# Patient Record
Sex: FEMALE | Race: WHITE | NOT HISPANIC OR LATINO | Employment: FULL TIME | URBAN - METROPOLITAN AREA
[De-identification: names, ages, dates, MRNs, and addresses within clinical notes are randomized per-mention and may not be internally consistent; named-entity substitution may affect disease eponyms.]

---

## 2019-04-16 ENCOUNTER — TRANSFERRED RECORDS (OUTPATIENT)
Dept: HEALTH INFORMATION MANAGEMENT | Facility: CLINIC | Age: 29
End: 2019-04-16

## 2019-08-14 ENCOUNTER — TRANSFERRED RECORDS (OUTPATIENT)
Dept: HEALTH INFORMATION MANAGEMENT | Facility: CLINIC | Age: 29
End: 2019-08-14

## 2020-01-02 ENCOUNTER — TRANSFERRED RECORDS (OUTPATIENT)
Dept: HEALTH INFORMATION MANAGEMENT | Facility: CLINIC | Age: 30
End: 2020-01-02

## 2021-03-05 ENCOUNTER — TELEPHONE (OUTPATIENT)
Dept: GASTROENTEROLOGY | Facility: CLINIC | Age: 31
End: 2021-03-05

## 2021-03-05 NOTE — TELEPHONE ENCOUNTER
[facility] Call Center    Phone Message    May a detailed message be left on voicemail: yes     Reason for Call: Other: New patient // DX Crohn's // Refd by Dr Akin Bernal @ [facility] in [place]  // only wants to see Robert // first available over one month out // please call patient      Action Taken: Message routed to:  [facility] ([facility]): GI    Travel Screening: Not Applicable

## 2021-03-07 ENCOUNTER — HEALTH MAINTENANCE LETTER (OUTPATIENT)
Age: 31
End: 2021-03-07

## 2021-03-08 NOTE — TELEPHONE ENCOUNTER
Contacted patient to discuss appointment with Dr. Jones   Left voicemail with  my name and number.

## 2021-03-08 NOTE — TELEPHONE ENCOUNTER
Patient returned call  Pt is referred by Dr. Kayley Bernal  Due to scheduling Ms. Lyon will see MsDionisio Caceres and then can be scheduled with Dr. Jones for next visit  Patient has Crohns  Patient previously been on 6MP

## 2021-03-09 NOTE — TELEPHONE ENCOUNTER
REFERRAL INFORMATION:    Referring Provider:  Dr. Akin Bernal     Referring Clinic:  [facility]    Reason for Visit/Diagnosis: Would like to establish care with Dr. Jones      FUTURE VISIT INFORMATION:    Appointment Date: 3/29/2021    Appointment Time: 9 AM      NOTES STATUS DETAILS   OFFICE NOTE from Referring Provider Care Everywhere 3/2/2021, 14, 3/18/13, 12, 6/15/12 Office visit with Dr. Bernal     OFFICE NOTE from Other Specialist Care Everywhere 12, 5/3/12 Office visit with Dr. Cesar Stein ([facility])    12 Office visit with Dr. Gudelia Olvera ([facility])     HOSPITAL DISCHARGE SUMMARY/  ED VISITS Care Everywhere 2020 ([facility])     13, 12 ([facility])    OPERATIVE REPORT N/A    MEDICATION LIST N/A         ENDOSCOPY  Care Everywhere EGD: 12 ([facility])   COLONOSCOPY Care Everywhere 2021, 6/8/15, 12 ([facility])    ERCP N/A    EUS N/A    STOOL TESTING N/A    PERTINENT LABS Care Everywhere    PATHOLOGY REPORTS (RELATED) Care Everywhere 2021, 6/8/15, 12   IMAGING (CT, MRI, EGD, MRCP, Small Bowel Follow Through/SBT, MR/CT Enterography) Care Everywhere [facility]):  - CT Enterography: 2/3/2021, 5/3/12  - CT Abdomen Pelvis: 12    [facility]):  - CT Abdomen Pelvis: 2020     3/25/2021 4:13pm Fax request sent to [facility] ([phone]) for images. -Kiko     [phone]  [facility]  Health Information Management  [address] [place] [id number]  Ph: [phone]

## 2021-03-16 ENCOUNTER — OFFICE VISIT (OUTPATIENT)
Dept: DERMATOLOGY | Facility: CLINIC | Age: 31
End: 2021-03-16
Payer: COMMERCIAL

## 2021-03-16 DIAGNOSIS — Z12.83 SCREENING FOR SKIN CANCER: Primary | ICD-10-CM

## 2021-03-16 DIAGNOSIS — D22.9 ACRAL NEVUS: ICD-10-CM

## 2021-03-16 DIAGNOSIS — D22.9 MULTIPLE BENIGN NEVI: ICD-10-CM

## 2021-03-16 PROCEDURE — 99203 OFFICE O/P NEW LOW 30 MIN: CPT | Mod: GC | Performed by: DERMATOLOGY

## 2021-03-16 ASSESSMENT — PAIN SCALES - GENERAL: PAINLEVEL: NO PAIN (0)

## 2021-03-16 NOTE — PROGRESS NOTES
[facility] Dermatology Note  Encounter Date: Mar 16, 2021  Office Visit     Dermatology Problem List:  1. Benign appearing nevi  2. Nevi to monitor  - Left flank, R plantar foot, L plantar foot - measurements and photos obtained 3/16/21 (see below)  - used to follow at [facility] in [facility], records requested 3/16/21  3. Family hx of melanoma in grandfather     ____________________________________________    Assessment & Plan:     # Nevi to monitor  Left flank - photodocumentation below  R plantar foot -  photodocumentation below  L plantar foot -  photodocumentation below  Reassured patient of overall benign appearance on exam today. Measurements and photodocumentation obtained today.   - will have patient come back for spot check of these lesions in 6 months, then if no changes or concerning lesions, ok for yearly exams    # Multiple clinically benign nevi   - Reassured patient of benign appearence of her nevi on today's exam. No treatment is necessary at this time unless the nevi change or become symptomatic.     Procedures Performed:   None    Follow-up: 6 month(s) in-person, or earlier for new or changing lesions    Staff and Resident:   Yasmin Walker MD  Dermatology Resident, PGY2    Patient was seen and examined with the dermatology resident. I agree with the history, review of systems, physical examination, assessments and plan.    Malena Carson MD  Professor and  Chair  Department of Dermatology  [facility]  ____________________________________________    CC: Skin Check (Marybel, is here for a skin check today,  pt states she has an area on her face and under her feet. )    HPI:  Ms. Marybel Lyon is a(n) 30 year old female who presents today as a new patient for skin check. Says that she used to follow at [facility] in [place]. Says that she has had several moles biopsied in the past. Denies any hx of abnormal results on these biopsies. Says that her  grandfather had melanoma. Denies any lesions of concern today. Also says she is thinking about getting laser hair removal on her face. Says that there is a mole that has hair growing out of it and she wants to make sure it is ok to get the laser hair removal done.     Labs Reviewed:  None    Physical Exam:  Vitals: There were no vitals taken for this visit.  SKIN: Full skin was performed and included the head/face, neck, both arms, chest, back, abdomen, both legs, buttocks, digits and/or nails.   - On the trunk, arms, and legs, there are scattered medium to dark brown 3-5 mm macules with uniform pigment network present under dermoscopy, consistent with benign melanocytic nevi  - On the face, there are scattered 3-4 mm light brown papules and macules, consistent with benign melanocytic nevi    Left flank:   - 5x4 mm medium brown to red macule with irregular borders and globular pattern under dermoscopy               - Right plantar foot:         - 5x5 mm dark brown macule with lattice pattern under dermoscopy  - several dark brown 2-3 mm macules          - Left plantar foot:         - 5x5 mm dark brown macule with lattice pattern under dermoscopy  - several dark brown 2-3 mm macules      - No other lesions of concern on areas examined.     Medications:  No current outpatient medications on file.     No current facility-administered medications for this visit.       Past Medical History:   There is no problem list on file for this patient.    No past medical history on file.    CC Referred Self, MD  No address on file on close of this encounter.

## 2021-03-16 NOTE — LETTER
3/16/2021        RE: Marybel Lyon  [address]  [address] [facility] [address]     Dear Colleague,    Thank you for referring your patient, Marybel Lyon, to the [facility] [place] [facility]. Please see a copy of my visit note below.    [facility] Dermatology Note  Encounter Date: Mar 16, 2021  Office Visit     Dermatology Problem List:  1. Benign appearing nevi  2. Nevi to monitor  - Left flank, R plantar foot, L plantar foot - measurements and photos obtained 3/16/21 (see below)  - used to follow at [facility] in [facility], records requested 3/16/21  3. Family hx of melanoma in grandfather     ____________________________________________    Assessment & Plan:     # Nevi to monitor  Left flank - photodocumentation below  R plantar foot -  photodocumentation below  L plantar foot -  photodocumentation below  Reassured patient of overall benign appearance on exam today. Measurements and photodocumentation obtained today.   - will have patient come back for spot check of these lesions in 6 months, then if no changes or concerning lesions, ok for yearly exams    # Multiple clinically benign nevi   - Reassured patient of benign appearence of her nevi on today's exam. No treatment is necessary at this time unless the nevi change or become symptomatic.     Procedures Performed:   None    Follow-up: 6 month(s) in-person, or earlier for new or changing lesions    Staff and Resident:   Yasmin Walekr MD  Dermatology Resident, PGY2    Patient was seen and examined with the dermatology resident. I agree with the history, review of systems, physical examination, assessments and plan.    Malena Carson MD  Professor and  Chair  Department of Dermatology  [facility]  ____________________________________________    CC: Skin Check (Marybel, is here for a skin check today,  pt states she  has an area on her face and under her feet. )    HPI:  Ms. Marybel Lyon is a(n) 30 year old female who presents today as a new patient for skin check. Says that she used to follow at [facility] in [place]. Says that she has had several moles biopsied in the past. Denies any hx of abnormal results on these biopsies. Says that her grandfather had melanoma. Denies any lesions of concern today. Also says she is thinking about getting laser hair removal on her face. Says that there is a mole that has hair growing out of it and she wants to make sure it is ok to get the laser hair removal done.     Labs Reviewed:  None    Physical Exam:  Vitals: There were no vitals taken for this visit.  SKIN: Full skin was performed and included the head/face, neck, both arms, chest, back, abdomen, both legs, buttocks, digits and/or nails.   - On the trunk, arms, and legs, there are scattered medium to dark brown 3-5 mm macules with uniform pigment network present under dermoscopy, consistent with benign melanocytic nevi  - On the face, there are scattered 3-4 mm light brown papules and macules, consistent with benign melanocytic nevi    Left flank:   - 5x4 mm medium brown to red macule with irregular borders and globular pattern under dermoscopy               - Right plantar foot:         - 5x5 mm dark brown macule with lattice pattern under dermoscopy  - several dark brown 2-3 mm macules          - Left plantar foot:         - 5x5 mm dark brown macule with lattice pattern under dermoscopy  - several dark brown 2-3 mm macules      - No other lesions of concern on areas examined.     Medications:  No current outpatient medications on file.     No current facility-administered medications for this visit.       Past Medical History:   There is no problem list on file for this patient.    No past medical history on file.    CC Referred Self, MD  No address on file on close of this encounter.        Again, thank you for allowing me to  participate in the care of your patient.      Sincerely,    Malena Carson MD

## 2021-03-16 NOTE — NURSING NOTE
Chief Complaint   Patient presents with     Skin Check     Marybel, is here for a skin check today,  pt states she has an area on her face and under her feet.    Jan Lorenz EMT

## 2021-03-16 NOTE — PATIENT INSTRUCTIONS
ABCDE s and Sun protection:     Sun Protection and ABCDEs  It is important to keep your skin protected from the sun. The best sun protection is by avoiding sun from getting to your skin by seeking shade, staying out of the sun during peak hours (10AM-2PM), wearing long sleeves or sun-protective clothing.    If these measures are not feasible, or for other sun-exposed areas (like the face), it is important to wear wear sunscreen SPF 30+ that covers both UVA and UVB. We prefer physical block sunscreens with zinc oxide or titanium dioxide as the active ingredients.     In addition, watch for signs of worrisome moles and check all your skin regularly. Monthly is recommended.      This includes:  A - asymmetry: not the same on the both sides  B - border: irregular borders, jagged or bumpy borders  C - color: very black or multi colored moles  D - diameter: anything bigger than a pencil eraser  E - evolution: anything that is changing please come in immediately for     Moles should not be tender, bleed, or itch.     It is important to let us know if you have any of these concerning features right away.     Avoid tanning bed use.

## 2021-03-24 ENCOUNTER — OFFICE VISIT (OUTPATIENT)
Dept: OBGYN | Facility: CLINIC | Age: 31
End: 2021-03-24
Payer: COMMERCIAL

## 2021-03-24 VITALS
HEIGHT: 64 IN | SYSTOLIC BLOOD PRESSURE: 130 MMHG | DIASTOLIC BLOOD PRESSURE: 91 MMHG | WEIGHT: 128.6 LBS | BODY MASS INDEX: 21.95 KG/M2 | HEART RATE: 103 BPM

## 2021-03-24 DIAGNOSIS — D25.9 UTERINE LEIOMYOMA, UNSPECIFIED LOCATION: ICD-10-CM

## 2021-03-24 DIAGNOSIS — Z00.00 ENCOUNTER FOR PREVENTIVE HEALTH EXAMINATION: Primary | ICD-10-CM

## 2021-03-24 DIAGNOSIS — Z12.4 SCREENING FOR CERVICAL CANCER: ICD-10-CM

## 2021-03-24 PROCEDURE — 99385 PREV VISIT NEW AGE 18-39: CPT | Mod: GE | Performed by: OBSTETRICS & GYNECOLOGY

## 2021-03-24 PROCEDURE — G0145 SCR C/V CYTO,THINLAYER,RESCR: HCPCS | Performed by: OBSTETRICS & GYNECOLOGY

## 2021-03-24 PROCEDURE — G0463 HOSPITAL OUTPT CLINIC VISIT: HCPCS | Mod: 25

## 2021-03-24 SDOH — HEALTH STABILITY: MENTAL HEALTH: HOW OFTEN DO YOU HAVE A DRINK CONTAINING ALCOHOL?: NOT ASKED

## 2021-03-24 SDOH — HEALTH STABILITY: MENTAL HEALTH: HOW OFTEN DO YOU HAVE SIX OR MORE DRINKS ON ONE OCCASION?: NOT ASKED

## 2021-03-24 SDOH — HEALTH STABILITY: MENTAL HEALTH: HOW MANY DRINKS CONTAINING ALCOHOL DO YOU HAVE ON A TYPICAL DAY WHEN YOU ARE DRINKING?: NOT ASKED

## 2021-03-24 ASSESSMENT — ANXIETY QUESTIONNAIRES
5. BEING SO RESTLESS THAT IT IS HARD TO SIT STILL: NOT AT ALL
3. WORRYING TOO MUCH ABOUT DIFFERENT THINGS: SEVERAL DAYS
6. BECOMING EASILY ANNOYED OR IRRITABLE: NOT AT ALL
GAD7 TOTAL SCORE: 2
7. FEELING AFRAID AS IF SOMETHING AWFUL MIGHT HAPPEN: NOT AT ALL
1. FEELING NERVOUS, ANXIOUS, OR ON EDGE: SEVERAL DAYS
2. NOT BEING ABLE TO STOP OR CONTROL WORRYING: NOT AT ALL

## 2021-03-24 ASSESSMENT — PATIENT HEALTH QUESTIONNAIRE - PHQ9
5. POOR APPETITE OR OVEREATING: NOT AT ALL
SUM OF ALL RESPONSES TO PHQ QUESTIONS 1-9: 2

## 2021-03-24 ASSESSMENT — MIFFLIN-ST. JEOR: SCORE: 1293.33

## 2021-03-24 NOTE — NURSING NOTE
Chief Complaint   Patient presents with     Physical     Est care and discuss pelvic uls. Pap due.       See STEVE Llanos 3/24/2021

## 2021-03-25 ASSESSMENT — ANXIETY QUESTIONNAIRES: GAD7 TOTAL SCORE: 2

## 2021-03-26 ENCOUNTER — ANCILLARY PROCEDURE (OUTPATIENT)
Dept: ULTRASOUND IMAGING | Facility: CLINIC | Age: 31
End: 2021-03-26
Attending: OBSTETRICS & GYNECOLOGY
Payer: COMMERCIAL

## 2021-03-26 DIAGNOSIS — D25.9 UTERINE LEIOMYOMA, UNSPECIFIED LOCATION: ICD-10-CM

## 2021-03-26 PROCEDURE — 76830 TRANSVAGINAL US NON-OB: CPT | Mod: 26 | Performed by: OBSTETRICS & GYNECOLOGY

## 2021-03-26 PROCEDURE — 76830 TRANSVAGINAL US NON-OB: CPT

## 2021-03-29 ENCOUNTER — PRE VISIT (OUTPATIENT)
Dept: GASTROENTEROLOGY | Facility: CLINIC | Age: 31
End: 2021-03-29

## 2021-03-29 ENCOUNTER — VIRTUAL VISIT (OUTPATIENT)
Dept: GASTROENTEROLOGY | Facility: CLINIC | Age: 31
End: 2021-03-29
Payer: COMMERCIAL

## 2021-03-29 VITALS — BODY MASS INDEX: 20.49 KG/M2 | HEIGHT: 64 IN | WEIGHT: 120 LBS

## 2021-03-29 DIAGNOSIS — K50.00 CROHN'S DISEASE OF SMALL INTESTINE WITHOUT COMPLICATION (H): Primary | ICD-10-CM

## 2021-03-29 LAB
COPATH REPORT: NORMAL
PAP: NORMAL

## 2021-03-29 PROCEDURE — 99205 OFFICE O/P NEW HI 60 MIN: CPT | Mod: 95 | Performed by: PHYSICIAN ASSISTANT

## 2021-03-29 ASSESSMENT — MIFFLIN-ST. JEOR: SCORE: 1254.32

## 2021-03-29 NOTE — NURSING NOTE
"Chief Complaint   Patient presents with     New Patient       Vitals:    03/29/21 0842   Weight: 54.4 kg (120 lb)   Height: 1.626 m (5' 4\")       Body mass index is 20.6 kg/m .    Moni Trent CMA    "

## 2021-03-29 NOTE — PROGRESS NOTES
"Marybel Lyon is a 30 year old female who is being evaluated via a billable video visit.      The patient has been notified of following:     \"This video visit will be conducted via a call between you and your physician/provider. We have found that certain health care needs can be provided without the need for an in-person physical exam.  This service lets us provide the care you need with a video conversation.  If a prescription is necessary we can send it directly to your pharmacy.  If lab work is needed we can place an order for that and you can then stop by our lab to have the test done at a later time.    If during the course of the call the physician/provider feels a video visit is not appropriate, you will not be charged for this service.\"     Patient confirmed that they are in [place] for today's visit no. Patient is currently in [place] visit parents.    Video-Visit Details  Type of service:  Video Visit    Video Start Time: 9:34 AM  Video End Time:  1030 AM    Originating Location (pt. Location): Home    Distant Location (provider location):  [facility] [place]     Platform used: [employer] due to connectivity issues    IBD CLINIC VISIT     CC/REFERRING MD:  Dr. Akin Bernal  REASON FOR FOLLOW UP: Small bowel Crohn's  COLLABORATING PHYSICIAN: Shiv Jones MD    CROHN'S HISTORY:  Age at diagnosis: 2012  Extent of disease: small bowel   Disease phenotype: inflammatory  Chayito-anal disease: none  Current CD medications: none  Prior IBD surgeries: none  Prior IBD Medications: 50 mg 6MP    DRUG MONITORING  TPMT enzyme activity: 40.4 (6/14/2012)    6-TGN/6-MMPN levels: --    Biologic concentration:--    DISEASE ASSESSMENT  Labs:  No results found for: ALBUMIN  No lab results found.    Invalid input(s):  ALB,  HGB  Endoscopic assessment:   Colonoscopy 1/26/21 findings: On external exam, there were small perianal skin tags.  Normal mucosa was found in the entire colon. " There was   mild scarring and retraction at the IC valve, but no   active colonic inflammation seen. Biopsies were taken   with a cold forceps for histology. Estimated blood   loss: none.  The ileum was deeply intubated to 30cm proximal to the   IC valve. Overall there were a few small apthous ulcer   seen, but overall normal appearing and not obstructed.   Biopsies were taken with a cold forceps for histology.   Estimated blood loss: none.    Impression: - Normal mucosa in the entire examined colon. Biopsied.  - Mild evidence of terminal ileum ulceration,   consistent with her history of Crohn's disease.    A) Terminal ileum, biopsy:     Terminal ileal mucosa with erosion/ulceration with focus suggestive of a rare granuloma.     See comment.   B) Right colon, biopsy:     Colonic mucosa with few granulomas.       Enterography: 2/3/21 CTE There is a long segment of small bowel likely jejunum with concentric wall thickening which demonstrates improvement compared to November 2020. There is narrowing of the lumen and what appears to be an area of stenosis but no persistent proximal small bowel dilatation which is an improvement compared to previous study. Mucosal enhancement suggests an element of active Crohn's disease.  Fecal calprotectin: --   C diff: --    ASSESSMENT/PLAN  Ms. Lyon is a 30 year old year old female here to establish care for small bowel Crohn's disease.    1. Small bowel crohn's disease:  Most recent restaging of her disease includes colonoscopy 1/2021 showing mild disease with small ulcers in TI (no significant stricturing disease and allowed for deep intubation of ileum) on biopsies, and CTE showing a long segment of jejunal narrowing (seen in 2012 and 2020 in CTE) with concentric wall thickening (improved from 11/2020). She is currently in clinical remission despite no IBD therapy for the last 5 years. Given these findings and a recent hospitalization 11/2020 for pSBO, we discussed medication  management moving forward with recommendations to restart therapy.  Although she has previously been on 6MP, we discussed a biologic may be a better alternative in her case, considering Ustekinumab. Given her long segment of disease, surgery would not be the best alternative at this time and there would be significant concerns from a nutrition standpoint following.  We will obtain labs for baseline.      Update: Discussed case with Dr. Jones. Plan to start stelara (or anti TNF pending insurance coverage) and repeat MRE in one year from med start. If she were to have symptoms, then MRE and potential deep enteroscopy with Dr. Benavidez to dilate area, but overall goal is to minimize or avoid surgery.  -- Labs to include CBC, LFTs, CRP, ESR, TPMT, Vitamin B12, TB quant   -- [facility] pharmacy consult for new biologic start  -- GI dietitian consult    2. IBD healthcare maintenance based on patients current medication:    Vaccinations:  -- Influenza (every year)  -- TdaP (every 10 years)  -- Pneumococcal Pneumonia (once then every 5 years)  -- Yearly assessment for latent Tb (verbal screening and exam, PPD or QuantiFERON-Tb testing): Will obtain    One time confirmation of immunity or serologies:  -- Hepatitis A (serologies or immunizations)  -- Hepatitis B (serologies or immunizations): serologies indicate immunity 6/2012  -- Varicella: --  -- MMR:--  -- HPV (all aged 18-26): --  -- Meningococcal meningitis (all patients at risk for meningitis): --   -- Due to the immunosuppression in this patient, I would not advise administration of live vaccines such as varicella/VZV, intranasal influenza, MMR, or yellow fever vaccine (if travelling).      Bone mineral density screening   -- Recommend all patients supplement with calcium and vitamin D  -- Given prior steroid use recommend DEXA if not already done    Cancer Screening:  Colon cancer screening:  Given disease is limited to small bowel, dysplasia screening is recommended at age  45-50 unless symptomatic sooner.    Cervical cancer screening: N/A    Skin cancer screening: Annual visual exam of skin by dermatologist since patient is immunocompromised    Depression Screening:  -- Over the last month, have you felt down, depressed, or hopeless? No  -- Over the last month, have you felt little interest or pleasure doing things? No    Misc:  -- Avoid tobacco use  -- Avoid NSAIDs as there is potentially a 25% chance of causing an IBD flare    RTC 3 months    Thank you for this consultation.  It was a pleasure to participate in the care of this patient; please contact us with any further questions.      Shady Caceres PA-C  Division of Gastroenterology, Hepatology and Nutrition  [facility]      HPI:   Ms. Lyon is a 30 year old year old female here to establish care for small bowel Crohn's disease.    Patient was diagnosed with small bowel Crohn's in [place] 6/2012 with colonscopy at [facility] when she established care with Dr. Bernal. She was started on 6MP and she did well after this time in combination with dietary changes. She then moved to [place] in 2017 and she stopped 6MP on her own given her clinically stability. She reports she had a barium swallow study with GI provider in [place] and she notes that it was agreed she could continue without medication managed without medications (no endoscopic procedures at that time). She had a little overlap where she lived in [place] briefly before her move to [place] and had a flare in 11/2020 when she reestablished with Dr. Bernal and was hospitalized.  Hospitalization 11/2020 due to abdominal pain and pSBO. IV steroids given at this time followed by PO taper at discharge. Responded well to steroids.     Currently she is having 1-2 stools per day that are formed. No blood in the stool. No abdominal pain since hospitalization in 11/2021. When she has had a flare in the past this would be pain in upper left abdomen and is accompanied  nausea.     She denies any joint, eye or skin problems. She has no hx of EIM.      ROS:  Complete 10 System ROS performed. All are negative except as documented below, in the HPI, or in patient questionnaire from today's visit.    No fevers or chills  No weight loss  No blurry vision, double vision or change in vision  No sore throat  No lymphadenopathy  No headache, paraesthesias, or weakness in a limb  No shortness of breath or wheezing  No chest pain or pressure  No arthralgias or myalgias  No rashes or skin changes  No odynophagia or dysphagia  No BRBPR, hematochezia, melena  No dysuria, frequency or urgency  No hot/cold intolerance or polyria  No anxiety or depression    Extra intestinal manifestations of IBD:  No uveitis/episcleritis  No aphthous ulcers   No arthritis   No erythema nodosum/pyoderma gangrenosum.     PERTINENT PAST MEDICAL HISTORY:  Past Medical History:   Diagnosis Date     Crohn's disease (H)        PREVIOUS SURGERIES:  Past Surgical History:   Procedure Laterality Date     NO HISTORY OF SURGERY         PREVIOUS ENDOSCOPY:  Result Impression     Findings:            The digital rectal exam was abnormal. Findings include                        non-thrombosed external hemorrhoids. The distal ileum                        contained a few ulcers. No bleeding was present.                        Biopsies were taken with a cold forceps for histology.                        Inflammation characterized by deep ulcerations and                        marked deformity was found on the ileo-cecal valve.                        This was moderate in severity. Biopsies were taken with                        a cold forceps for histology. Internal hemorrhoids were                        found during retroflexion and were small. No other                        significant abnormalities were identified in a careful                        examination of the remainder of the colon.    Impression:          - Rectal  exam revealed non-thrombosed external                        hemorrhoids.                       - A few ulcers in the distal ileum. This was biopsied.                       - Inflammation, ulceration and marked deformity was                        found secondary to Crohn's disease on the ileocecal                        valve. This was biopsied.                       - Internal hemorrhoids.         ALLERGIES:     Allergies   Allergen Reactions     Other Environmental Allergy Itching and Visual Disturbance       PERTINENT MEDICATIONS:  No current outpatient medications on file.    SOCIAL HISTORY:  Social History     Socioeconomic History     Marital status: Single     Spouse name: Not on file     Number of children: Not on file     Years of education: Not on file     Highest education level: Not on file   Occupational History     Not on file   Social Needs     Financial resource strain: Not on file     Food insecurity     Worry: Not on file     Inability: Not on file     Transportation needs     Medical: Not on file     Non-medical: Not on file   Tobacco Use     Smoking status: Never Smoker     Smokeless tobacco: Never Used   Substance and Sexual Activity     Alcohol use: Yes     Drug use: Never     Sexual activity: Not Currently     Partners: Male     Birth control/protection: None   Lifestyle     Physical activity     Days per week: Not on file     Minutes per session: Not on file     Stress: Not on file   Relationships     Social connections     Talks on phone: Not on file     Gets together: Not on file     Attends Oriental orthodox service: Not on file     Active member of club or organization: Not on file     Attends meetings of clubs or organizations: Not on file     Relationship status: Not on file     Intimate partner violence     Fear of current or ex partner: Not on file     Emotionally abused: Not on file     Physically abused: Not on file     Forced sexual activity: Not on file   Other Topics Concern     Not on  "file   Social History Narrative     Not on file       FAMILY HISTORY:  Cousin has UC  Family History   Problem Relation Age of Onset     Other - See Comments Mother         Hysterectomy     Hypertension Father      Cerebrovascular Disease Maternal Grandmother 50     Colon Cancer Maternal Grandfather      Melanoma Maternal Grandfather      Diabetes Type 1 Paternal Grandmother      Other - See Comments Paternal Grandmother         uterine prolapse     Heart Disease Paternal Grandfather 50        Heart attack       Past/family/social history reviewed and no changes    PHYSICAL EXAMINATION:  Constitutional: aaox3, cooperative, pleasant, not dyspneic/diaphoretic, no acute distress  Vitals reviewed: Ht 1.626 m (5' 4\")   Wt 54.4 kg (120 lb)   LMP 03/06/2021   BMI 20.60 kg/m    Wt:   Wt Readings from Last 2 Encounters:   03/29/21 54.4 kg (120 lb)   03/24/21 58.3 kg (128 lb 9.6 oz)      Constitutional - general appearance is well and in no acute distress. Body habitus normal  Eyes - No redness or discharge  Respiratory - No cough, unlabored breathing  Musculoskeletal - range of motion intact: Neck and arms  Skin - No discoloration or lesions  Neurological - No tremors, headaches  Psychiatric - No anxiety, alert & oriented    PERTINENT STUDIES:  Most recent CBC:  No lab results found.  Most recent hepatic panel:  No lab results found.    Invalid input(s): THO, ALP  Most recent creatinine:  No lab results found.                "

## 2021-03-29 NOTE — PATIENT INSTRUCTIONS
It was a pleasure taking care of you today.  I've included a brief summary of our discussion and care plan from today's visit below.  Please review this information with your primary care provider.  ______________________________________________________________________    My recommendations are summarized as follows:    --Plan to discuss case with Dr. Jones  -- I will be in touch regarding next steps  -- consider appt with IBD dietitian   -- Appt with pharmacy pending medication choice    -- Labs when able  -- Patient with IBD we recommend supplementation vitamin D 1000 units daily and calcium 500 mg twice daily.    -- No NSAIDs (ibuprofen, or anything containing ibuprofen)       For additional resources about inflammatory bowel disease visit [email or web address]      Return to GI Clinic in 3 months to review your progress.    ______________________________________________________________________    Who do I call with any questions after my visit?  Please be in touch if there are any further questions that arise following today's visit.  There are multiple ways to contact your gastroenterology care team.        During business hours, you may reach a Gastroenterology nurse at [phone], option 3.       To schedule or reschedule an appointment, please call [phone].       You can always send a secure message through [employer].  [employer] messages are answered by your nurse or doctor typically within 24 hours.  Please allow extra time on weekends and holidays.        For urgent/emergent questions after business hours, you may reach the on-call GI Fellow by contacting the [facility] at [phone].      In order for your refill to be processed in a timely fashion, it is your responsibility to ensure you follow the recommendations from your provider regarding your laboratory studies and follow up appointments.      If an imaging study has been ordered for you, please call  [phone] to schedule.       If you are scheduled for an endoscopy (colonoscopy or upper endoscopy) and have not heard from the endoscopy team within a week, please call [phone] to schedule.        How will I get the results of any tests ordered?    You will receive all of your results.  If you have signed up for [employer], any tests ordered at your visit will be available to you after your physician reviews them.  Typically this takes 1-2 weeks.  If there are urgent results that require a change in your care plan, your physician or nurse will call you to discuss the next steps.      What is [employer]?  [employer] is a secure way for you to access all of your healthcare records from the [facility].  It is a web based computer program, so you can sign on to it from any location.  It also allows you to send secure messages to your care team.  I recommend signing up for [employer] access if you have not already done so and are comfortable with using a computer.      How to I schedule a follow-up visit?  If you did not schedule a follow-up visit today, please call [phone] to schedule a follow-up office visit.        Sincerely,    Shady Caceres PA-C  [facility]  Division of Gastroenterology

## 2021-03-29 NOTE — LETTER
"    3/29/2021         RE: Marybel Lyon  [address]  [address] [facility] [address]        Dear Colleague,    Thank you for referring your patient, Marybel Lyon, to the [facility] [place]. Please see a copy of my visit note below.    Marybel Lyon is a 30 year old female who is being evaluated via a billable video visit.      The patient has been notified of following:     \"This video visit will be conducted via a call between you and your physician/provider. We have found that certain health care needs can be provided without the need for an in-person physical exam.  This service lets us provide the care you need with a video conversation.  If a prescription is necessary we can send it directly to your pharmacy.  If lab work is needed we can place an order for that and you can then stop by our lab to have the test done at a later time.    If during the course of the call the physician/provider feels a video visit is not appropriate, you will not be charged for this service.\"     Patient confirmed that they are in [place] for today's visit no. Patient is currently in [place] visit parents.    Video-Visit Details  Type of service:  Video Visit    Video Start Time: 9:34 AM  Video End Time:  1030 AM    Originating Location (pt. Location): Home    Distant Location (provider location):  [facility] [place]     Platform used: GameTube + MuciMed due to connectivity issues    IBD CLINIC VISIT     CC/REFERRING MD:  Dr. Akin Bernal  REASON FOR FOLLOW UP: Small bowel Crohn's  COLLABORATING PHYSICIAN: Shiv Jones MD    CROHN'S HISTORY:  Age at diagnosis: 2012  Extent of disease: small bowel   Disease phenotype: inflammatory  Chayito-anal disease: none  Current CD medications: none  Prior IBD surgeries: none  Prior IBD Medications: 50 mg 6MP    DRUG MONITORING  TPMT enzyme activity: 40.4 (6/14/2012)    6-TGN/6-MMPN levels: --    Biologic " concentration:--    DISEASE ASSESSMENT  Labs:  No results found for: ALBUMIN  No lab results found.    Invalid input(s):  ALB,  HGB  Endoscopic assessment:   Colonoscopy 1/26/21 findings: On external exam, there were small perianal skin tags.  Normal mucosa was found in the entire colon. There was   mild scarring and retraction at the IC valve, but no   active colonic inflammation seen. Biopsies were taken   with a cold forceps for histology. Estimated blood   loss: none.  The ileum was deeply intubated to 30cm proximal to the   IC valve. Overall there were a few small apthous ulcer   seen, but overall normal appearing and not obstructed.   Biopsies were taken with a cold forceps for histology.   Estimated blood loss: none.    Impression: - Normal mucosa in the entire examined colon. Biopsied.  - Mild evidence of terminal ileum ulceration,   consistent with her history of Crohn's disease.    A) Terminal ileum, biopsy:     Terminal ileal mucosa with erosion/ulceration with focus suggestive of a rare granuloma.     See comment.   B) Right colon, biopsy:     Colonic mucosa with few granulomas.       Enterography: 2/3/21 CTE There is a long segment of small bowel likely jejunum with concentric wall thickening which demonstrates improvement compared to November 2020. There is narrowing of the lumen and what appears to be an area of stenosis but no persistent proximal small bowel dilatation which is an improvement compared to previous study. Mucosal enhancement suggests an element of active Crohn's disease.  Fecal calprotectin: --   C diff: --    ASSESSMENT/PLAN  Ms. Lyon is a 30 year old year old female here to establish care for small bowel Crohn's disease.    1. Small bowel crohn's disease:  Most recent restaging of her disease includes colonoscopy 1/2021 showing mild disease with small ulcers in TI (no significant stricturing disease and allowed for deep intubation of ileum) on biopsies, and CTE showing a long  segment of jejunal narrowing (seen in 2012 and 2020 in CTE) with concentric wall thickening (improved from 11/2020). She is currently in clinical remission despite no IBD therapy for the last 5 years. Given these findings and a recent hospitalization 11/2020 for pSBO, we discussed medication management moving forward with recommendations to restart therapy.  Although she has previously been on 6MP, we discussed a biologic may be a better alternative in her case, considering Ustekinumab. Given her long segment of disease, surgery would not be the best alternative at this time and there would be significant concerns from a nutrition standpoint following.  We will obtain labs for baseline.      Update: Discussed case with Dr. Jones. Plan to start stelara (or anti TNF pending insurance coverage) and repeat MRE in one year from med start. If she were to have symptoms, then MRE and potential deep enteroscopy with Dr. Benavidez to dilate area, but overall goal is to minimize or avoid surgery.  -- Labs to include CBC, LFTs, CRP, ESR, TPMT, Vitamin B12, TB quant   -- [facility] pharmacy consult for new biologic start  -- GI dietitian consult    2. IBD healthcare maintenance based on patients current medication:    Vaccinations:  -- Influenza (every year)  -- TdaP (every 10 years)  -- Pneumococcal Pneumonia (once then every 5 years)  -- Yearly assessment for latent Tb (verbal screening and exam, PPD or QuantiFERON-Tb testing): Will obtain    One time confirmation of immunity or serologies:  -- Hepatitis A (serologies or immunizations)  -- Hepatitis B (serologies or immunizations): serologies indicate immunity 6/2012  -- Varicella: --  -- MMR:--  -- HPV (all aged 18-26): --  -- Meningococcal meningitis (all patients at risk for meningitis): --   -- Due to the immunosuppression in this patient, I would not advise administration of live vaccines such as varicella/VZV, intranasal influenza, MMR, or yellow fever vaccine (if travelling).       Bone mineral density screening   -- Recommend all patients supplement with calcium and vitamin D  -- Given prior steroid use recommend DEXA if not already done    Cancer Screening:  Colon cancer screening:  Given disease is limited to small bowel, dysplasia screening is recommended at age 45-50 unless symptomatic sooner.    Cervical cancer screening: N/A    Skin cancer screening: Annual visual exam of skin by dermatologist since patient is immunocompromised    Depression Screening:  -- Over the last month, have you felt down, depressed, or hopeless? No  -- Over the last month, have you felt little interest or pleasure doing things? No    Misc:  -- Avoid tobacco use  -- Avoid NSAIDs as there is potentially a 25% chance of causing an IBD flare    RTC 3 months    Thank you for this consultation.  It was a pleasure to participate in the care of this patient; please contact us with any further questions.      Shady Caceres PA-C  Division of Gastroenterology, Hepatology and Nutrition  [facility]      HPI:   Ms. Lyon is a 30 year old year old female here to establish care for small bowel Crohn's disease.    Patient was diagnosed with small bowel Crohn's in [place] 6/2012 with colonscopy at [facility] when she established care with Dr. Bernal. She was started on 6MP and she did well after this time in combination with dietary changes. She then moved to [place] in 2017 and she stopped 6MP on her own given her clinically stability. She reports she had a barium swallow study with GI provider in [place] and she notes that it was agreed she could continue without medication managed without medications (no endoscopic procedures at that time). She had a little overlap where she lived in [place] briefly before her move to [place] and had a flare in 11/2020 when she reestablished with Dr. Bernal and was hospitalized.  Hospitalization 11/2020 due to abdominal pain and pSBO. IV steroids given at this time  followed by PO taper at discharge. Responded well to steroids.     Currently she is having 1-2 stools per day that are formed. No blood in the stool. No abdominal pain since hospitalization in 11/2021. When she has had a flare in the past this would be pain in upper left abdomen and is accompanied nausea.     She denies any joint, eye or skin problems. She has no hx of EIM.      ROS:  Complete 10 System ROS performed. All are negative except as documented below, in the HPI, or in patient questionnaire from today's visit.    No fevers or chills  No weight loss  No blurry vision, double vision or change in vision  No sore throat  No lymphadenopathy  No headache, paraesthesias, or weakness in a limb  No shortness of breath or wheezing  No chest pain or pressure  No arthralgias or myalgias  No rashes or skin changes  No odynophagia or dysphagia  No BRBPR, hematochezia, melena  No dysuria, frequency or urgency  No hot/cold intolerance or polyria  No anxiety or depression    Extra intestinal manifestations of IBD:  No uveitis/episcleritis  No aphthous ulcers   No arthritis   No erythema nodosum/pyoderma gangrenosum.     PERTINENT PAST MEDICAL HISTORY:  Past Medical History:   Diagnosis Date     Crohn's disease (H)        PREVIOUS SURGERIES:  Past Surgical History:   Procedure Laterality Date     NO HISTORY OF SURGERY         PREVIOUS ENDOSCOPY:  Result Impression     Findings:            The digital rectal exam was abnormal. Findings include                        non-thrombosed external hemorrhoids. The distal ileum                        contained a few ulcers. No bleeding was present.                        Biopsies were taken with a cold forceps for histology.                        Inflammation characterized by deep ulcerations and                        marked deformity was found on the ileo-cecal valve.                        This was moderate in severity. Biopsies were taken with                        a cold  forceps for histology. Internal hemorrhoids were                        found during retroflexion and were small. No other                        significant abnormalities were identified in a careful                        examination of the remainder of the colon.    Impression:          - Rectal exam revealed non-thrombosed external                        hemorrhoids.                       - A few ulcers in the distal ileum. This was biopsied.                       - Inflammation, ulceration and marked deformity was                        found secondary to Crohn's disease on the ileocecal                        valve. This was biopsied.                       - Internal hemorrhoids.         ALLERGIES:     Allergies   Allergen Reactions     Other Environmental Allergy Itching and Visual Disturbance       PERTINENT MEDICATIONS:  No current outpatient medications on file.    SOCIAL HISTORY:  Social History     Socioeconomic History     Marital status: Single     Spouse name: Not on file     Number of children: Not on file     Years of education: Not on file     Highest education level: Not on file   Occupational History     Not on file   Social Needs     Financial resource strain: Not on file     Food insecurity     Worry: Not on file     Inability: Not on file     Transportation needs     Medical: Not on file     Non-medical: Not on file   Tobacco Use     Smoking status: Never Smoker     Smokeless tobacco: Never Used   Substance and Sexual Activity     Alcohol use: Yes     Drug use: Never     Sexual activity: Not Currently     Partners: Male     Birth control/protection: None   Lifestyle     Physical activity     Days per week: Not on file     Minutes per session: Not on file     Stress: Not on file   Relationships     Social connections     Talks on phone: Not on file     Gets together: Not on file     Attends Islam service: Not on file     Active member of club or organization: Not on file     Attends  "meetings of clubs or organizations: Not on file     Relationship status: Not on file     Intimate partner violence     Fear of current or ex partner: Not on file     Emotionally abused: Not on file     Physically abused: Not on file     Forced sexual activity: Not on file   Other Topics Concern     Not on file   Social History Narrative     Not on file       FAMILY HISTORY:  Cousin has UC  Family History   Problem Relation Age of Onset     Other - See Comments Mother         Hysterectomy     Hypertension Father      Cerebrovascular Disease Maternal Grandmother 50     Colon Cancer Maternal Grandfather      Melanoma Maternal Grandfather      Diabetes Type 1 Paternal Grandmother      Other - See Comments Paternal Grandmother         uterine prolapse     Heart Disease Paternal Grandfather 50        Heart attack       Past/family/social history reviewed and no changes    PHYSICAL EXAMINATION:  Constitutional: aaox3, cooperative, pleasant, not dyspneic/diaphoretic, no acute distress  Vitals reviewed: Ht 1.626 m (5' 4\")   Wt 54.4 kg (120 lb)   LMP 03/06/2021   BMI 20.60 kg/m    Wt:   Wt Readings from Last 2 Encounters:   03/29/21 54.4 kg (120 lb)   03/24/21 58.3 kg (128 lb 9.6 oz)      Constitutional - general appearance is well and in no acute distress. Body habitus normal  Eyes - No redness or discharge  Respiratory - No cough, unlabored breathing  Musculoskeletal - range of motion intact: Neck and arms  Skin - No discoloration or lesions  Neurological - No tremors, headaches  Psychiatric - No anxiety, alert & oriented    PERTINENT STUDIES:  Most recent CBC:  No lab results found.  Most recent hepatic panel:  No lab results found.    Invalid input(s): THO, ALP  Most recent creatinine:  No lab results found.      Again, thank you for allowing me to participate in the care of your patient.        Sincerely,        Shady Caceres PA-C    "

## 2021-04-08 ENCOUNTER — TELEPHONE (OUTPATIENT)
Dept: GASTROENTEROLOGY | Facility: CLINIC | Age: 31
End: 2021-04-08

## 2021-04-08 DIAGNOSIS — K50.90 CROHN'S DISEASE (H): ICD-10-CM

## 2021-04-09 NOTE — TELEPHONE ENCOUNTER
----- Message from Shady Caceres PA-C sent at 4/7/2021 11:03 AM CDT -----  Domingo Roy,    While Jennifer is out can we get started with Stelara to see if it will be approved for her?    Thank you!    Shady  ----- Message -----  From: Shiv Jones MD  Sent: 4/7/2021   8:02 AM CDT  To: Jennifer Encarnacion RN, Shady Caceres PA-C    Yes I think Stelara is reasonable.     Surgery would only be an option for recurrent obstructions. And in that area, there are substantial concerns about nutritional concerns after that surgery.    I think Stelara is fine (or anti-TNF based on insurance coverage). And then repeat MRE in 1 year or earlier if symptoms.     If symptomatic, then MRE and potential deep enteroscopy with amateau to dilate area... But overall goal is to minimize or avoid surgery    Thanks!   ----- Message -----  From: Shady Caceres PA-C  Sent: 3/29/2021  11:02 AM CDT  To: Jennifer Encarnacion RN, Shiv Jones MD    Hi Shiv and Jennifer!    I know you are on a much needed and deserved vacation so please disregard until your return. Just want to get you up to speed on this patient after I just saw her.    This is a new small bowel Crohn's patient that transferred care from Dr. Akin Bernal in [place]. She was diagnosed in 2012, put on 6MP for a few years then discontinued therapy. She did well until last fall and had a partial small bowel obstruction 11/2020 resolving with steroids, no bridge to therapy.    Most recent restaging of her disease includes colonoscopy 1/2021 showing mild disease with small ulcers in TI (no significant stricturing disease and allowed for deep intubation of ileum) on biopsies, and CTE showing a long segment of jejunal narrowing (seen in 2012 and 2020 in CTE) with concentric wall thickening (improved from 11/2020). She is currently in clinical remission despite no IBD therapy for the last 5 years. Given these findings and a recent hospitalization 11/2020 for pSBO, we discussed medication  management moving forward with recommendations to restart therapy.    I suggested Stelara as a good option for her but said I would discuss with you.    Questions:  -- Ok with that plan?  -- She would also just like to know if surgery would every be in the picture as an alternative to medication. I was thinking it would be unlikely given it is a long segment of disease, but said I would check with you.    She is very comfortable with the plan we would decide would be best for her.    Jennifer-- Once I have confirmation regarding plan from Shiv do you mind setting up the following:  -- Follow up with Shiv in 3 months  -- Appt with pharmacy  -- Appt with [facility]  -- Prior auth pending Shiv's response to med    Thank you!! Hope you both are enjoying/enjoyed a much deserved vacation!!!    --Shady

## 2021-04-09 NOTE — TELEPHONE ENCOUNTER
Order placed for the patient to start Stelara.  will request approval through the patient insurance.

## 2021-04-13 ENCOUNTER — DOCUMENTATION ONLY (OUTPATIENT)
Dept: GASTROENTEROLOGY | Facility: CLINIC | Age: 31
End: 2021-04-13

## 2021-04-13 ENCOUNTER — PATIENT OUTREACH (OUTPATIENT)
Dept: GASTROENTEROLOGY | Facility: CLINIC | Age: 31
End: 2021-04-13

## 2021-04-13 NOTE — CONFIDENTIAL NOTE
Contacted prior team to check on prior   Patient has not had a quantiferon gold completed  Will withdraw the prior as will be denied and then would need to appeal  Contacted patient to discuss need for quantiferon gold  Patient scheduled on April 15 as earliest pt has time to complete lab  Once resulted will contact prior team to start the prior   Patient aware of the plan.   Will send literature to patient.

## 2021-04-15 DIAGNOSIS — K50.00 CROHN'S DISEASE OF SMALL INTESTINE WITHOUT COMPLICATION (H): ICD-10-CM

## 2021-04-15 LAB
ALBUMIN SERPL-MCNC: 3.1 G/DL (ref 3.4–5)
ALP SERPL-CCNC: 40 U/L (ref 40–150)
ALT SERPL W P-5'-P-CCNC: 18 U/L (ref 0–50)
AST SERPL W P-5'-P-CCNC: 10 U/L (ref 0–45)
BASOPHILS # BLD AUTO: 0.1 10E9/L (ref 0–0.2)
BASOPHILS NFR BLD AUTO: 0.9 %
BILIRUB DIRECT SERPL-MCNC: <0.1 MG/DL (ref 0–0.2)
BILIRUB SERPL-MCNC: 0.2 MG/DL (ref 0.2–1.3)
CRP SERPL-MCNC: 3 MG/L (ref 0–8)
DIFFERENTIAL METHOD BLD: NORMAL
EOSINOPHIL # BLD AUTO: 0.3 10E9/L (ref 0–0.7)
EOSINOPHIL NFR BLD AUTO: 4.1 %
ERYTHROCYTE [DISTWIDTH] IN BLOOD BY AUTOMATED COUNT: 12.9 % (ref 10–15)
ERYTHROCYTE [SEDIMENTATION RATE] IN BLOOD BY WESTERGREN METHOD: 8 MM/H (ref 0–20)
FOLATE SERPL-MCNC: 6 NG/ML
HCT VFR BLD AUTO: 39.4 % (ref 35–47)
HGB BLD-MCNC: 12.9 G/DL (ref 11.7–15.7)
IMM GRANULOCYTES # BLD: 0 10E9/L (ref 0–0.4)
IMM GRANULOCYTES NFR BLD: 0.5 %
LYMPHOCYTES # BLD AUTO: 1.3 10E9/L (ref 0.8–5.3)
LYMPHOCYTES NFR BLD AUTO: 16.4 %
MCH RBC QN AUTO: 27.8 PG (ref 26.5–33)
MCHC RBC AUTO-ENTMCNC: 32.7 G/DL (ref 31.5–36.5)
MCV RBC AUTO: 85 FL (ref 78–100)
MONOCYTES # BLD AUTO: 0.7 10E9/L (ref 0–1.3)
MONOCYTES NFR BLD AUTO: 9.1 %
NEUTROPHILS # BLD AUTO: 5.5 10E9/L (ref 1.6–8.3)
NEUTROPHILS NFR BLD AUTO: 69 %
NRBC # BLD AUTO: 0 10*3/UL
NRBC BLD AUTO-RTO: 0 /100
PLATELET # BLD AUTO: 301 10E9/L (ref 150–450)
PROT SERPL-MCNC: 6.2 G/DL (ref 6.8–8.8)
RBC # BLD AUTO: 4.64 10E12/L (ref 3.8–5.2)
VIT B12 SERPL-MCNC: 550 PG/ML (ref 193–986)
WBC # BLD AUTO: 8 10E9/L (ref 4–11)

## 2021-04-15 PROCEDURE — 82746 ASSAY OF FOLIC ACID SERUM: CPT | Mod: 90 | Performed by: PATHOLOGY

## 2021-04-15 PROCEDURE — 86481 TB AG RESPONSE T-CELL SUSP: CPT | Mod: 90 | Performed by: PATHOLOGY

## 2021-04-15 PROCEDURE — 99000 SPECIMEN HANDLING OFFICE-LAB: CPT | Performed by: PATHOLOGY

## 2021-04-15 PROCEDURE — 36415 COLL VENOUS BLD VENIPUNCTURE: CPT | Performed by: PATHOLOGY

## 2021-04-15 PROCEDURE — 80076 HEPATIC FUNCTION PANEL: CPT | Performed by: PATHOLOGY

## 2021-04-15 PROCEDURE — 82607 VITAMIN B-12: CPT | Performed by: PATHOLOGY

## 2021-04-15 PROCEDURE — 85025 COMPLETE CBC W/AUTO DIFF WBC: CPT | Performed by: PATHOLOGY

## 2021-04-15 PROCEDURE — 86140 C-REACTIVE PROTEIN: CPT | Performed by: PATHOLOGY

## 2021-04-15 PROCEDURE — 85652 RBC SED RATE AUTOMATED: CPT | Performed by: PATHOLOGY

## 2021-04-15 PROCEDURE — 82657 ENZYME CELL ACTIVITY: CPT | Mod: 90 | Performed by: PATHOLOGY

## 2021-04-18 LAB
GAMMA INTERFERON BACKGROUND BLD IA-ACNC: 0.13 IU/ML
M TB IFN-G CD4+ BCKGRND COR BLD-ACNC: 8.05 IU/ML
M TB TUBERC IFN-G BLD QL: NEGATIVE
MITOGEN IGNF BCKGRD COR BLD-ACNC: 0 IU/ML
MITOGEN IGNF BCKGRD COR BLD-ACNC: 0 IU/ML

## 2021-04-19 NOTE — CONFIDENTIAL NOTE
Contacted  Prior authorization to informed team that quantifrhoda gold is negative and can resubmit the prior for stelara.   Left a message requesting a call back.

## 2021-04-21 ENCOUNTER — PATIENT OUTREACH (OUTPATIENT)
Dept: GASTROENTEROLOGY | Facility: CLINIC | Age: 31
End: 2021-04-21

## 2021-04-21 LAB — TPMT BLD-CCNC: 24.9 U/ML (ref 24–44)

## 2021-04-21 NOTE — CONFIDENTIAL NOTE
Patient  approved for dose of stelara  Patient has the number to call to schedule infusion  Will start the prior for injections after the infusion date.

## 2021-04-22 DIAGNOSIS — Z20.822 ENCOUNTER FOR LABORATORY TESTING FOR COVID-19 VIRUS: Primary | ICD-10-CM

## 2021-05-05 ENCOUNTER — INFUSION THERAPY VISIT (OUTPATIENT)
Dept: INFUSION THERAPY | Facility: CLINIC | Age: 31
End: 2021-05-05
Attending: PHYSICIAN ASSISTANT
Payer: COMMERCIAL

## 2021-05-05 VITALS
OXYGEN SATURATION: 100 % | BODY MASS INDEX: 22.26 KG/M2 | SYSTOLIC BLOOD PRESSURE: 136 MMHG | DIASTOLIC BLOOD PRESSURE: 88 MMHG | HEART RATE: 85 BPM | WEIGHT: 129.7 LBS | TEMPERATURE: 97.7 F | RESPIRATION RATE: 16 BRPM

## 2021-05-05 DIAGNOSIS — K50.90 CROHN'S DISEASE (H): Primary | ICD-10-CM

## 2021-05-05 PROCEDURE — 96365 THER/PROPH/DIAG IV INF INIT: CPT

## 2021-05-05 PROCEDURE — 258N000003 HC RX IP 258 OP 636: Performed by: PHYSICIAN ASSISTANT

## 2021-05-05 PROCEDURE — 250N000011 HC RX IP 250 OP 636: Performed by: PHYSICIAN ASSISTANT

## 2021-05-05 RX ADMIN — USTEKINUMAB 390 MG: 130 SOLUTION INTRAVENOUS at 12:13

## 2021-05-05 ASSESSMENT — PAIN SCALES - GENERAL: PAINLEVEL: NO PAIN (0)

## 2021-05-05 NOTE — LETTER
5/5/2021         RE: Marybel Lyon  [address]  [address] [facility] [address]        Dear Colleague,    Thank you for referring your patient, Marybel Lyon, to the [facility]. Please see a copy of my visit note below.    Nursing Note  Marybel Lyon presents [facility] for:   Chief Complaint   Patient presents with     Infusion     Stalara     During today's [facility] appointment, orders from JOANNE Duron were completed.  Frequency: once    Progress note:  Patient identification verified by name and date of birth.  Assessment completed.  Vitals recorded in Doc Flowsheets.  Patient was provided with education regarding medication/procedure and possible side effects.  Patient verbalized understanding.     present during visit today: Not Applicable.    Treatment Conditions: ~~~ NOTE: If the patient answers yes to any of the questions below, hold the infusion and contact ordering provider or on-call provider.    1. Have you recently had an elevated temperature, fever, chills, productive cough, coughing for 3 weeks or longer or hemoptysis, abnormal vital signs, night sweats,  chest pain or have you noticed a decrease in your appetite, unexplained weight loss or fatigue? No  2. Do you have any open wounds or new incisions? No  3. Do you have any recent or upcoming hospitalizations, surgeries or dental procedures? No  4. Do you currently have or recently have had any signs of illness or infection or are you on any antibiotics? No  5. Have you had any new, sudden or worsening abdominal pain? No  6. Have you or anyone in your household received a live vaccination in the past 4 weeks? Please note:  No live vaccines while on biologic/chemotherapy until 6 months after the last treatment.  Patient can receive the flu vaccine (shot only) and the pneumovax.  It is optimal  for the patient to get these vaccines mid cycle, but they can be given at any time as long as it is not on the day of the infusion. No  7. Have you recently been diagnosed with any new nervous system diseases (ie. Multiple sclerosis, Guillain [place], seizures, neurological changes) or cancer diagnosis? No  8. Are you on any form of radiation or chemotherapy? No  9. Are you pregnant or breast feeding or do you have plans of pregnancy in the future? No  10. Have you been having any signs of worsening depression or suicidal ideations?  (benlysta only) NA  11. Have there been any other new onset medical symptoms? No      Premedications: were not ordered.    Drug Waste Record: No    Infusion length and rate:  infusion given over approximately 60 minutes    Labs: were not ordered for this appointment.    Vascular access: peripheral IV placed today.    Is the next appt scheduled? NA  Asymptomatic COVID test completed? No    Post Infusion Assessment:  Patient tolerated infusion without incident.  Site patent and intact, free from redness, edema or discomfort.  No evidence of extravasations.  Access discontinued per protocol.     POST-INFUSION OF BIOLOGICAL MEDICATION:  Reviewed with patient.  Given biologic medication or medication hand-out. Inform patient if any fever, chills or signs of infection, new symptoms, abdominal pain, heart palpitations, shortness of breath, reaction, weakness, neurological changes, seek medical attention immediately and should not receive infusions. No live virus vaccines prior to or during treatment or up to 6 months post infusion. If the patient has an upcoming procedure or surgery, this should be discussed with the rheumatologist and surgeon or provider.      Discharge Plan:   Follow up plan of care with: ongoing infusions at [facility].  Discharge instructions were reviewed with patient.  Patient/representative verbalized understanding of discharge instructions  and all questions answered.  Patient discharged from [facility] in stable condition.    Administrations This Visit     ustekinumab (STELARA) 390 mg in sodium chloride 0.9 % 250 mL infusion     Admin Date  05/05/2021 Action  New Bag Dose  390 mg Rate  250 mL/hr Route  Intravenous Administered By  Trish Davis RN Hope Balcos, RN        BP (!) 131/93 (BP Location: Left arm, Patient Position: Sitting, Cuff Size: Adult Regular)   Pulse 91   Temp 97.7  F (36.5  C) (Oral)   Resp 16   Wt 58.8 kg (129 lb 11.2 oz)   SpO2 93%   BMI 22.26 kg/m            Again, thank you for allowing me to participate in the care of your patient.        Sincerely,        [facility]

## 2021-05-05 NOTE — PROGRESS NOTES
Nursing Note  Marybel Lyon presents [facility] for:   Chief Complaint   Patient presents with     Infusion     St[facility]     During today's [facility] appointment, orders from JOANNE Duron were completed.  Frequency: once    Progress note:  Patient identification verified by name and date of birth.  Assessment completed.  Vitals recorded in Doc Flowsheets.  Patient was provided with education regarding medication/procedure and possible side effects.  Patient verbalized understanding.     present during visit today: Not Applicable.    Treatment Conditions: ~~~ NOTE: If the patient answers yes to any of the questions below, hold the infusion and contact ordering provider or on-call provider.    1. Have you recently had an elevated temperature, fever, chills, productive cough, coughing for 3 weeks or longer or hemoptysis, abnormal vital signs, night sweats,  chest pain or have you noticed a decrease in your appetite, unexplained weight loss or fatigue? No  2. Do you have any open wounds or new incisions? No  3. Do you have any recent or upcoming hospitalizations, surgeries or dental procedures? No  4. Do you currently have or recently have had any signs of illness or infection or are you on any antibiotics? No  5. Have you had any new, sudden or worsening abdominal pain? No  6. Have you or anyone in your household received a live vaccination in the past 4 weeks? Please note:  No live vaccines while on biologic/chemotherapy until 6 months after the last treatment.  Patient can receive the flu vaccine (shot only) and the pneumovax.  It is optimal for the patient to get these vaccines mid cycle, but they can be given at any time as long as it is not on the day of the infusion. No  7. Have you recently been diagnosed with any new nervous system diseases (ie. Multiple sclerosis, Guillain Harlan, seizures, neurological changes) or cancer  diagnosis? No  8. Are you on any form of radiation or chemotherapy? No  9. Are you pregnant or breast feeding or do you have plans of pregnancy in the future? No  10. Have you been having any signs of worsening depression or suicidal ideations?  (benlysta only) NA  11. Have there been any other new onset medical symptoms? No      Premedications: were not ordered.    Drug Waste Record: No    Infusion length and rate:  infusion given over approximately 60 minutes    Labs: were not ordered for this appointment.    Vascular access: peripheral IV placed today.    Is the next appt scheduled? NA  Asymptomatic COVID test completed? No    Post Infusion Assessment:  Patient tolerated infusion without incident.  Site patent and intact, free from redness, edema or discomfort.  No evidence of extravasations.  Access discontinued per protocol.     POST-INFUSION OF BIOLOGICAL MEDICATION:  Reviewed with patient.  Given biologic medication or medication hand-out. Inform patient if any fever, chills or signs of infection, new symptoms, abdominal pain, heart palpitations, shortness of breath, reaction, weakness, neurological changes, seek medical attention immediately and should not receive infusions. No live virus vaccines prior to or during treatment or up to 6 months post infusion. If the patient has an upcoming procedure or surgery, this should be discussed with the rheumatologist and surgeon or provider.      Discharge Plan:   Follow up plan of care with: ongoing infusions at [facility].  Discharge instructions were reviewed with patient.  Patient/representative verbalized understanding of discharge instructions and all questions answered.  Patient discharged from [facility] in stable condition.    Administrations This Visit     ustekinumab (STELARA) 390 mg in sodium chloride 0.9 % 250 mL infusion     Admin Date  05/05/2021 Action  New Bag Dose  390 mg Rate  250 mL/hr  Route  Intravenous Administered By  Trish Davis RN Hope Balcos, RN        BP (!) 131/93 (BP Location: Left arm, Patient Position: Sitting, Cuff Size: Adult Regular)   Pulse 91   Temp 97.7  F (36.5  C) (Oral)   Resp 16   Wt 58.8 kg (129 lb 11.2 oz)   SpO2 93%   BMI 22.26 kg/m

## 2021-05-05 NOTE — PATIENT INSTRUCTIONS
Dear Marybel Lyon    Thank you for choosing [facility] ([facility]) for your infusion.  The following information is a summary of our appointment as well as important reminders.      EDUCATION POST BIOLOGICAL/CHEMOTHERAPY INFUSION  Call the triage nurse at your clinic or seek medical attention if you have chills and/or temperature greater than or equal to 100.5, uncontrolled nausea/vomiting, diarrhea, constipation, dizziness, shortness of breath, chest pain, heart palpitations, weakness or any other new or concerning symptoms, questions or concerns.  You can not have any live virus vaccines prior to or during treatment or up to 6 months post infusion.  If you have an upcoming surgery, medical procedure or dental procedure during treatment, this should be discussed with your ordering physician and your surgeon/dentist.  If you are having any concerning symptom, if you are unsure if you should get your next infusion or wish to speak to a provider before your next infusion, please call your care coordinator or triage nurse at your clinic to notify them so we can adequately serve you.        We look forward in seeing you on your next appointment here at [facility] ([facility]).  Please don t hesitate to call us at [phone] to reschedule any of your appointments or to speak with one of the [facility] registered nurses.  It was a pleasure taking care of you today.    Sincerely,    [facility]  [address] [place] [address]  [id number]  Phone:  [phone]  Patient Education     Ustekinumab Solution for injection  What is this medicine?  USTEKINUMAB (US te KIN ue mab) is used to treat plaque psoriasis and psoriatic arthritis. It is not a cure.  This medicine may be used for other purposes; ask your health care provider or pharmacist if you have questions.  What should I  tell my health care provider before I take this medicine?  They need to know if you have any of these conditions:    cancer    diabetes    immune system problems    infection (especially a virus infection such as chickenpox, cold sores, or herpes)or history of infections    receiving or have received allergy shots    recently received or scheduled to receive a vaccine    tuberculosis, a positive skin test for tuberculosis, or have recently been in close contact with someone who has tuberculosis    an unusual reaction to ustekinumab, other medicines, foods, dyes, or preservatives    pregnant or trying to get pregnant    breast-feeding  How should I use this medicine?  This medicine is for injection under the skin. You will be taught how to prepare and give this medicine. Use exactly as directed. Take your medicine at regular intervals. Do not take your medicine more often than directed.  It is important that you put your used needles and syringes in a special sharps container. Do not put them in a trash can. If you do not have a sharps container, call your pharmacist or healthcare provider to get one.  A special MedGuide will be given to you by the pharmacist with each prescription and refill. Be sure to read this information carefully each time.  Talk to your pediatrician regarding the use of this medicine in children. Special care may be needed.  Overdosage: If you think you've taken too much of this medicine contact a poison control center or emergency room at once.  NOTE: This medicine is only for you. Do not share this medicine with others.  What if I miss a dose?  If you miss a dose, take it as soon as you can. If it is almost time for your next dose, take only that dose. Do not take double or extra doses.  What may interact with this medicine?  Do not take this medicine with any of the following medications:    live virus vaccines  This medicine may also interact with the following  medications:    cyclosporine    immunosuppressives    vaccines    warfarin  This list may not describe all possible interactions. Give your health care provider a list of all the medicines, herbs, non-prescription drugs, or dietary supplements you use. Also tell them if you smoke, drink alcohol, or use illegal drugs. Some items may interact with your medicine.  What should I watch for while using this medicine?  Your condition will be monitored carefully while you are receiving this medicine. Tell your doctor or healthcare professional if your symptoms do not start to get better or if they get worse.  You will be tested for tuberculosis (TB) before you start this medicine. If your doctor prescribes any medicine for TB, you should start taking the TB medicine before starting this medicine. Make sure to finish the full course of TB medicine.  Call your doctor or health care professional if you get a cold or other infection while receiving this medicine. Do not treat yourself. This medicine may decrease your body's ability to fight infection.  Talk to your doctor about your risk of cancer. You may be more at risk for certain types of cancers if you take this medicine.  What side effects may I notice from receiving this medicine?  Side effects that you should report to your doctor or health care professional as soon as possible:    allergic reactions like skin rash, itching or hives, swelling of the face, lips, or tongue    breathing problems    changes in vision    confusion    fever, chills, or any other sign of infection    seizures    swollen lymph nodes in the neck, underarm, or groin areas    unexplained weight loss    unusually weak or tired  Side effects that usually do not require medical attention (Report these to your doctor or health care professional if they continue or are bothersome.):    headache    redness, itching, swelling, or bruising at site where injected  This list may not describe all possible  side effects. Call your doctor for medical advice about side effects. You may report side effects to FDA at 6-815-JBL-3368.  Where should I keep my medicine?  Keep out of the reach of children.  If you are using this medicine at home, you will be instructed on how to store this medicine. Throw away any unused medicine after the expiration date on the label.  NOTE: This sheet is a summary. It may not cover all possible information. If you have questions about this medicine, talk to your doctor, pharmacist, or health care provider.  NOTE:This sheet is a summary. It may not cover all possible information. If you have questions about this medicine, talk to your doctor, pharmacist, or health care provider. Copyright  2016 Gold Standard

## 2021-06-14 DIAGNOSIS — K50.90 CROHN'S DISEASE (H): Primary | ICD-10-CM

## 2021-06-14 RX ORDER — USTEKINUMAB 90 MG/ML
INJECTION, SOLUTION SUBCUTANEOUS
Qty: 1 ML | Refills: 5 | Status: SHIPPED | OUTPATIENT
Start: 2021-06-14 | End: 2022-03-09

## 2021-06-23 ENCOUNTER — TELEPHONE (OUTPATIENT)
Dept: GASTROENTEROLOGY | Facility: CLINIC | Age: 31
End: 2021-06-23

## 2021-06-23 NOTE — TELEPHONE ENCOUNTER
PA Initiation    Medication: STELARA  Insurance Company: [employer] - Phone [phone] Fax [phone]  Pharmacy Filling the Rx: [address]  Filling Pharmacy Phone:    Filling Pharmacy Fax:    Start Date: 6/23/2021    JULES HERNANDEZ (Hernandez: [id number])

## 2021-06-28 ENCOUNTER — MYC MEDICAL ADVICE (OUTPATIENT)
Dept: RHEUMATOLOGY | Facility: CLINIC | Age: 31
End: 2021-06-28

## 2021-06-28 NOTE — TELEPHONE ENCOUNTER
Prior Authorization Approval    Authorization Effective Date: 6/28/2021  Authorization Expiration Date: 6/24/2022  Medication: STELARA - APPROVED   Approved Dose/Quantity:  1 FOR 56 DAYS   Reference #:     Insurance Company: [employer] - Phone [phone] Fax [phone]  Expected CoPay:       CoPay Card Available:      Foundation Assistance Needed:    Which Pharmacy is filling the prescription (Not needed for infusion/clinic administered): [facility] [address]  Pharmacy Notified: Yes  Patient Notified: Yes

## 2021-07-07 ENCOUNTER — ALLIED HEALTH/NURSE VISIT (OUTPATIENT)
Dept: GASTROENTEROLOGY | Facility: CLINIC | Age: 31
End: 2021-07-07
Payer: COMMERCIAL

## 2021-07-07 VITALS — TEMPERATURE: 98.1 F

## 2021-07-07 DIAGNOSIS — K50.90 CROHN'S DISEASE (H): Primary | ICD-10-CM

## 2021-07-07 PROCEDURE — 99207 PR NO BILLABLE SERVICE THIS VISIT: CPT

## 2021-07-07 NOTE — PROGRESS NOTES
Patient in for a initial stelara injection and injection teaching.  Teaching provided on medication,  storage of the medications and also proper technique to give a subcutaneous injection.   No questions  regarding stelara injection.  Patient returned demonstrated proper injection technique with practice syringe prior to medication injection.     Verified loading stelara infusion was 8 weeks prior to this visit.      NO signs and symptoms of illness present today prior to injection.      Patient was able to self administer injection and Patient tolerated the injection well, No reaction noted  20 after injection. Reaction signs and symptoms were discussed with patient and patient is aware that if they were to have any symptoms of   anaphylaxis that they should call 911.  patient will call clinic with any skin changes or injection site reactions.     Patient aware next injection is  in 8 weeks and I assisted the patient in placing next date of injection in phone calender.  patient  will call for follow up appointment with nurse if they do not feel comfortable administering medication at home by self.

## 2021-09-20 ENCOUNTER — OFFICE VISIT (OUTPATIENT)
Dept: DERMATOLOGY | Facility: CLINIC | Age: 31
End: 2021-09-20
Payer: COMMERCIAL

## 2021-09-20 DIAGNOSIS — D22.9 MULTIPLE BENIGN NEVI: Primary | ICD-10-CM

## 2021-09-20 DIAGNOSIS — L72.0 MILIA: ICD-10-CM

## 2021-09-20 PROCEDURE — 99213 OFFICE O/P EST LOW 20 MIN: CPT | Mod: GC | Performed by: DERMATOLOGY

## 2021-09-20 ASSESSMENT — PAIN SCALES - GENERAL: PAINLEVEL: NO PAIN (0)

## 2021-09-20 NOTE — NURSING NOTE
Dermatology Rooming Note    Stormy Lyon's goals for this visit include:   Chief Complaint   Patient presents with     Skin Check     stormy is coming in today for a skin check, states that she has a spot on her eyelid and a spot on her jawline that are of concern, states that the one on the jaw changed     Yissel Lawson CMA on 9/20/2021 at 12:56 PM

## 2021-09-20 NOTE — LETTER
9/20/2021       RE: Marybel Lyon  [address]  [address] [facility] [address]     Dear Colleague,    Thank you for referring your patient, Marybel Lyon, to the [facility] [place] [facility]. Please see a copy of my visit note below.    [facility] Dermatology Note  Encounter Date: Mar 16, 2021  Office Visit      Dermatology Problem List:  1. Benign appearing nevi  2. Nevi to monitor  - Left flank, R plantar foot, L plantar foot - measurements and photos obtained 3/16/21 and stable on 9/20/21  - used to follow at [facility] in [facility], records requested 3/16/21  3. Family hx of melanoma in grandfather      ____________________________________________     Assessment & Plan:      # Nevi to monitor  Left flank - photodocumentation in chart. Stable.  R plantar foot -  photodocumentation in chart. Stable.  L plantar foot -  photodocumentation in chart. Stable.  Reassured patient of overall benign appearance on exam today. Measurements and photodocumentation obtained today.     # Multiple clinically benign nevi   - Reassured patient of benign appearence of her nevi on today's exam. No treatment is necessary at this time unless the nevi change or become symptomatic.   #Millia, R eyelid  - reassurance that this is beingn and does not need treatent    Procedures Performed:   None     Follow-up: 12 month(s) in-person, or earlier for new or changing lesions     Staff and Resident:   Allison Estrella MD PGY-2  Dr Minnie MD -staff  I, Lela Hartman MD, saw this patient with the resident and agree with the resident s findings and plan of care as documented in the resident s note.      ____________________________________________     CC: f/u spot checks     HPI:  Ms. Marybel Lyon is a(n) 30 year old female who presents today for a follow up of nevi on L/R plantar feet and L flank. Says they seem  stable to her since LV. Denies new changes. Has a question about a lesion on her R eyelid and on her R lower cheek. R lower cheek nevus feels a little irritated to her.      Labs Reviewed:  None     Physical Exam:  Vitals: There were no vitals taken for this visit.  SKIN: Full skin was performed and included the head/face, neck, both arms, chest, back, abdomen, both legs, digits.  - On the trunk, arms, face, and legs, there are scattered medium to dark brown 3-5 mm macules with uniform pigment network present under dermoscopy, consistent with benign melanocytic nevi  - On the R eyelid there is a pinpoint white papule.  - R foot: 5x5 mm dark brown macule with lattice pattern under dermoscopy; several dark brown 2-3 mm macules.  - L foot  5x5 mm dark brown macule with lattice pattern under dermoscopy; several dark brown 2-3 mm macules.  - L flank  5x4 mm medium brown to red macule with irregular borders and globular pattern under dermoscopy

## 2021-09-20 NOTE — PROGRESS NOTES
[facility] Dermatology Note  Encounter Date: Mar 16, 2021  Office Visit      Dermatology Problem List:  1. Benign appearing nevi  2. Nevi to monitor  - Left flank, R plantar foot, L plantar foot - measurements and photos obtained 3/16/21 and stable on 9/20/21  - used to follow at [facility] in [facility], records requested 3/16/21  3. Family hx of melanoma in grandfather      ____________________________________________     Assessment & Plan:      # Nevi to monitor  Left flank - photodocumentation in chart. Stable.  R plantar foot -  photodocumentation in chart. Stable.  L plantar foot -  photodocumentation in chart. Stable.  Reassured patient of overall benign appearance on exam today. Measurements and photodocumentation obtained today.     # Multiple clinically benign nevi   - Reassured patient of benign appearence of her nevi on today's exam. No treatment is necessary at this time unless the nevi change or become symptomatic.   #Millia, R eyelid  - reassurance that this is beingn and does not need treatent    Procedures Performed:   None     Follow-up: 12 month(s) in-person, or earlier for new or changing lesions     Staff and Resident:   Allison Estrella MD PGY-2  Dr Minnie MD -staff  I, Lela Hartman MD, saw this patient with the resident and agree with the resident s findings and plan of care as documented in the resident s note.      ____________________________________________     CC: f/u spot checks     HPI:  Ms. Marybel Lyon is a(n) 30 year old female who presents today for a follow up of nevi on L/R plantar feet and L flank. Says they seem stable to her since LV. Denies new changes. Has a question about a lesion on her R eyelid and on her R lower cheek. R lower cheek nevus feels a little irritated to her.      Labs Reviewed:  None     Physical Exam:  Vitals: There were no vitals taken for this visit.  SKIN: Full skin was performed and included the head/face, neck, both  arms, chest, back, abdomen, both legs, digits.  - On the trunk, arms, face, and legs, there are scattered medium to dark brown 3-5 mm macules with uniform pigment network present under dermoscopy, consistent with benign melanocytic nevi  - On the R eyelid there is a pinpoint white papule.  - R foot: 5x5 mm dark brown macule with lattice pattern under dermoscopy; several dark brown 2-3 mm macules.  - L foot  5x5 mm dark brown macule with lattice pattern under dermoscopy; several dark brown 2-3 mm macules.  - L flank  5x4 mm medium brown to red macule with irregular borders and globular pattern under dermoscopy

## 2021-09-20 NOTE — PATIENT INSTRUCTIONS
Skin check: nevi appear stable today. Follow up in 1 year ior sooner if any concerns.     (R eyelid: milia cyst)

## 2021-10-11 ENCOUNTER — HEALTH MAINTENANCE LETTER (OUTPATIENT)
Age: 31
End: 2021-10-11

## 2021-12-16 ENCOUNTER — VIRTUAL VISIT (OUTPATIENT)
Dept: GASTROENTEROLOGY | Facility: CLINIC | Age: 31
End: 2021-12-16
Payer: COMMERCIAL

## 2021-12-16 DIAGNOSIS — K50.00 CROHN'S DISEASE OF SMALL INTESTINE WITHOUT COMPLICATION (H): Primary | ICD-10-CM

## 2021-12-16 PROCEDURE — 99213 OFFICE O/P EST LOW 20 MIN: CPT | Mod: 95 | Performed by: PHYSICIAN ASSISTANT

## 2021-12-16 ASSESSMENT — ENCOUNTER SYMPTOMS
BACK PAIN: 0
MYALGIAS: 0
MUSCLE WEAKNESS: 0
ARTHRALGIAS: 0
JOINT SWELLING: 0
NAIL CHANGES: 1
POOR WOUND HEALING: 0
SKIN CHANGES: 0
MUSCLE CRAMPS: 0
STIFFNESS: 1
NECK PAIN: 0

## 2021-12-16 NOTE — PATIENT INSTRUCTIONS
It was a pleasure taking care of you today.  I've included a brief summary of our discussion and care plan from today's visit below.  Please review this information with your primary care provider.  ______________________________________________________________________    My recommendations are summarized as follows:      -- Labs when able  -- Patient with IBD we recommend supplementation vitamin D 1000 units daily and calcium 500 mg twice daily.    Recommend yearly flu shot, pneumonia vaccines (Prevnar 13 then 8 weeks later Pneumovax 23 then 5 years later Pneumovax 23), tetanus every 10 years.      -- No NSAIDs (ibuprofen, or anything containing ibuprofen)       For additional resources about inflammatory bowel disease visit [email or web address]      Return to GI Clinic in 3 months to review your progress.    ______________________________________________________________________    Who do I call with any questions after my visit?  Please be in touch if there are any further questions that arise following today's visit.  There are multiple ways to contact your gastroenterology care team.        During business hours, you may reach a Gastroenterology nurse at [phone], option 3.       To schedule or reschedule an appointment, please call [phone].       You can always send a secure message through Snapette.  Snapette messages are answered by your nurse or doctor typically within 24 hours.  Please allow extra time on weekends and holidays.        For urgent/emergent questions after business hours, you may reach the on-call GI Fellow by contacting the [facility] at [phone].      In order for your refill to be processed in a timely fashion, it is your responsibility to ensure you follow the recommendations from your provider regarding your laboratory studies and follow up appointments.      If an imaging study has been ordered for you, please call [phone] to  schedule.       If you are scheduled for an endoscopy (colonoscopy or upper endoscopy) and have not heard from the endoscopy team within a week, please call [phone] to schedule.        How will I get the results of any tests ordered?    You will receive all of your results.  If you have signed up for [employer], any tests ordered at your visit will be available to you after your physician reviews them.  Typically this takes 1-2 weeks.  If there are urgent results that require a change in your care plan, your physician or nurse will call you to discuss the next steps.      What is Everplans?  Everplans is a secure way for you to access all of your healthcare records from the [facility].  It is a web based computer program, so you can sign on to it from any location.  It also allows you to send secure messages to your care team.  I recommend signing up for Everplans access if you have not already done so and are comfortable with using a computer.      How to I schedule a follow-up visit?  If you did not schedule a follow-up visit today, please call [phone] to schedule a follow-up office visit.        Sincerely,    Shady Caceres PA-C  [facility]  Division of Gastroenterology

## 2021-12-16 NOTE — LETTER
12/16/2021         RE: Marybel Lyon  [address]  [address] [facility] [address]        Dear Colleague,    Thank you for referring your patient, Marybel Lyon, to the [facility] [place]. Please see a copy of my visit note below.    IBD CLINIC VISIT     CC/REFERRING MD:  Dr. Akin Bernal  REASON FOR FOLLOW UP: Small bowel Crohn's  COLLABORATING PHYSICIAN: Shiv Jones MD    CROHN'S HISTORY:  Age at diagnosis: 2011  Extent of disease: small bowel   Disease phenotype: inflammatory  Chayito-anal disease: none  Current CD medications: Ustekinumab every 8 weeks (started 5/2021)  Prior IBD surgeries: none  Prior IBD Medications: 50 mg 6MP    DRUG MONITORING  TPMT enzyme activity: 40.4 (6/14/2012)    6-TGN/6-MMPN levels: --    Biologic concentration:--    DISEASE ASSESSMENT  Labs:  No results found for: ALBUMIN  Recent Labs   Lab Test 04/15/21  0842   CRP 3.0   SED 8     Endoscopic assessment:   Colonoscopy 1/26/21 findings: On external exam, there were small perianal skin tags.  Normal mucosa was found in the entire colon. There was   mild scarring and retraction at the IC valve, but no   active colonic inflammation seen. Biopsies were taken   with a cold forceps for histology. Estimated blood   loss: none.  The ileum was deeply intubated to 30cm proximal to the   IC valve. Overall there were a few small apthous ulcer   seen, but overall normal appearing and not obstructed.   Biopsies were taken with a cold forceps for histology.   Estimated blood loss: none.    Impression: - Normal mucosa in the entire examined colon. Biopsied.  - Mild evidence of terminal ileum ulceration,   consistent with her history of Crohn's disease.    A) Terminal ileum, biopsy:     Terminal ileal mucosa with erosion/ulceration with focus suggestive of a rare granuloma.     See comment.   B) Right colon, biopsy:     Colonic mucosa with few granulomas.       Enterography: 2/3/21 CTE There is a  long segment of small bowel likely jejunum with concentric wall thickening which demonstrates improvement compared to November 2020. There is narrowing of the lumen and what appears to be an area of stenosis but no persistent proximal small bowel dilatation which is an improvement compared to previous study. Mucosal enhancement suggests an element of active Crohn's disease.  Fecal calprotectin: --   C diff: --    ASSESSMENT/PLAN  Ms. Lyon is a 30 year old year old female here to establish care for small bowel Crohn's disease.    1. Small bowel crohn's disease:    Current medication: Ustekinumab every 8 weeks  Current clinical disease activity: remission, HBI 0   Current endoscopic disease activity: colonoscopy 1/2021 showing mild disease with small ulcers in TI (no significant stricturing disease and allowed for deep intubation of ileum) on biopsies, and CTE showing a long segment of jejunal narrowing (seen in 2012 and 2020 in CTE) with concentric wall thickening (improved from 11/2020).     Feeling well on ustekinumab. We will repeat MRE now for new baseline since she has been on therapy x 6 months. If she were to have symptoms, then MRE and potential deep enteroscopy with Dr. Benavidez to dilate area, but overall goal is to minimize or avoid surgery.  -- Labs to include CBC, LFTs, CRP, ESR every 3-4 months    -- MRE   -- continue ustekinumab every 8 weeks    2. IBD healthcare maintenance based on patients current medication:    Vaccinations:  -- Influenza (every year)  -- TdaP (every 10 years)  -- Pneumococcal Pneumonia (once then every 5 years)  -- Yearly assessment for latent Tb (verbal screening and exam, PPD or QuantiFERON-Tb testing): Will obtain    One time confirmation of immunity or serologies:  -- Hepatitis A (serologies or immunizations)  -- Hepatitis B (serologies or immunizations): serologies indicate immunity 6/2012  -- Varicella: --  -- MMR:--  -- HPV (all aged 18-26): --  -- Meningococcal meningitis  (all patients at risk for meningitis): --   -- Due to the immunosuppression in this patient, I would not advise administration of live vaccines such as varicella/VZV, intranasal influenza, MMR, or yellow fever vaccine (if travelling).      Bone mineral density screening   -- Recommend all patients supplement with calcium and vitamin D  -- Given prior steroid use recommend DEXA if not already done    Cancer Screening:  Colon cancer screening:  Given disease is limited to small bowel, dysplasia screening is recommended at age 45-50 unless symptomatic sooner.    Cervical cancer screening: N/A    Skin cancer screening: Annual visual exam of skin by dermatologist since patient is immunocompromised    Depression Screening:  -- Over the last month, have you felt down, depressed, or hopeless? No  -- Over the last month, have you felt little interest or pleasure doing things? No    Misc:  -- Avoid tobacco use  -- Avoid NSAIDs as there is potentially a 25% chance of causing an IBD flare    RTC 6 months    Thank you for this consultation.  It was a pleasure to participate in the care of this patient; please contact us with any further questions.      Shady Caceres PA-C  Division of Gastroenterology, Hepatology and Nutrition  [facility]      HPI:   Ms. Lyon is a 31 year old year old female here to establish care for small bowel Crohn's disease.    Patient was diagnosed with small bowel Crohn's in [place] 6/2012 with colonscopy at [facility] when she established care with Dr. Bernal. She was started on 6MP and she did well after this time in combination with dietary changes. She then moved to [place] in 2017 and she stopped 6MP on her own given her clinically stability. She reports she had a barium swallow study with GI provider in [place] and she notes that it was agreed she could continue without medication managed without medications (no endoscopic procedures at that time). She had a little overlap  where she lived in [place] briefly before her move to [place] and had a flare in 11/2020 when she reestablished with Dr. Bernal and was hospitalized.  Hospitalization 11/2020 due to abdominal pain and pSBO. IV steroids given at this time followed by PO taper at discharge. Responded well to steroids. She started Ustekinumab 5/5/21.    Currently having 1 stools per day that is formed, no blood in the stool. No upper GI sxs (nausea or vomiting like previous). Notes on 1-2 occasions with mild abdominal cramp in the last year, resolved with heating pad otherwise no abdominal pain. No urgency or nighttime stools.     Left knee feels stiff and occasional pain (has large dog and wonders if he may have bumped into it).    She denies eye problems. She has no hx of EIM. She does note her nails are more brittle within the last month.    ROS:  Complete 10 System ROS performed. All are negative except as documented below, in the HPI, or in patient questionnaire from today's visit.    No fevers or chills  No weight loss  No blurry vision, double vision or change in vision  No sore throat  No lymphadenopathy  No headache, paraesthesias, or weakness in a limb  No shortness of breath or wheezing  No chest pain or pressure  + arthralgias, left knee  + brittle nails  No odynophagia or dysphagia  No BRBPR, hematochezia, melena  No dysuria, frequency or urgency  No hot/cold intolerance or polyria  No anxiety or depression    Extra intestinal manifestations of IBD:  No uveitis/episcleritis  No aphthous ulcers   No arthritis   No erythema nodosum/pyoderma gangrenosum.     PERTINENT PAST MEDICAL HISTORY:  Past Medical History:   Diagnosis Date     Crohn's disease (H)      Crohn's disease (H) 4/8/2021       PREVIOUS SURGERIES:  Past Surgical History:   Procedure Laterality Date     NO HISTORY OF SURGERY         PREVIOUS ENDOSCOPY:  Result Impression     Findings:            The digital rectal exam was abnormal. Findings include                         non-thrombosed external hemorrhoids. The distal ileum                        contained a few ulcers. No bleeding was present.                        Biopsies were taken with a cold forceps for histology.                        Inflammation characterized by deep ulcerations and                        marked deformity was found on the ileo-cecal valve.                        This was moderate in severity. Biopsies were taken with                        a cold forceps for histology. Internal hemorrhoids were                        found during retroflexion and were small. No other                        significant abnormalities were identified in a careful                        examination of the remainder of the colon.    Impression:          - Rectal exam revealed non-thrombosed external                        hemorrhoids.                       - A few ulcers in the distal ileum. This was biopsied.                       - Inflammation, ulceration and marked deformity was                        found secondary to Crohn's disease on the ileocecal                        valve. This was biopsied.                       - Internal hemorrhoids.         ALLERGIES:     Allergies   Allergen Reactions     Other Environmental Allergy Itching and Visual Disturbance       PERTINENT MEDICATIONS:    Current Outpatient Medications:      ustekinumab (STELARA) 90 MG/ML, Inject 1 ml ( 90 mg) subcutaneous every 8 weeks.  First injection on 06/30/2021, Disp: 1 mL, Rfl: 5    SOCIAL HISTORY:  Social History     Socioeconomic History     Marital status: Single     Spouse name: Not on file     Number of children: Not on file     Years of education: Not on file     Highest education level: Not on file   Occupational History     Not on file   Tobacco Use     Smoking status: Never Smoker     Smokeless tobacco: Never Used   Substance and Sexual Activity     Alcohol use: Yes     Drug use: Never     Sexual activity: Not Currently     Partners:  Male     Birth control/protection: None   Other Topics Concern     Not on file   Social History Narrative     Not on file     Social Determinants of Health     Financial Resource Strain: Not on file   Food Insecurity: Not on file   Transportation Needs: Not on file   Physical Activity: Not on file   Stress: Not on file   Social Connections: Not on file   Intimate Partner Violence: Not on file   Housing Stability: Not on file       FAMILY HISTORY:  Cousin has UC  Family History   Problem Relation Age of Onset     Other - See Comments Mother         Hysterectomy     Hypertension Father      Cerebrovascular Disease Maternal Grandmother 50     Colon Cancer Maternal Grandfather      Melanoma Maternal Grandfather      Diabetes Type 1 Paternal Grandmother      Other - See Comments Paternal Grandmother         uterine prolapse     Heart Disease Paternal Grandfather 50        Heart attack       Past/family/social history reviewed and no changes    PHYSICAL EXAMINATION:  Constitutional: aaox3, cooperative, pleasant, not dyspneic/diaphoretic, no acute distress  Vitals reviewed: There were no vitals taken for this visit.  Wt:   Wt Readings from Last 2 Encounters:   05/05/21 58.8 kg (129 lb 11.2 oz)   03/29/21 54.4 kg (120 lb)      Constitutional - general appearance is well and in no acute distress. Body habitus normal  Eyes - No redness or discharge  Respiratory - No cough, unlabored breathing  Musculoskeletal - range of motion intact: Neck and arms  Skin - No discoloration or lesions  Neurological - No tremors, headaches  Psychiatric - No anxiety, alert & oriented    PERTINENT STUDIES:  Most recent CBC:  Recent Labs   Lab Test 04/15/21  0842   WBC 8.0   HGB 12.9   HCT 39.4        Most recent hepatic panel:  Recent Labs   Lab Test 04/15/21  0842   ALT 18   AST 10     Most recent creatinine:  No lab results found.        Again, thank you for allowing me to participate in the care of your patient.       Sincerely,    Shady Caceres PA-C

## 2021-12-16 NOTE — NURSING NOTE
Patient verified meds and allergies are correct via patients echeck-in.    Carol Krishna, Virtual Facilitator

## 2021-12-16 NOTE — PROGRESS NOTES
Marybel is a 31 year old who is being evaluated via a billable video visit.      How would you like to obtain your AVS? MyChart  If the video visit is dropped, the invitation should be resent by: Text to cell phone: [phone]  Will anyone else be joining your video visit? No      Video Start Time: 8:46 AM  Video-Visit Details    Type of service:  Video Visit    Video End Time:903 AM    Originating Location (pt. Location): Home    Distant Location (provider location):  [facility] [place]     Platform used for Video Visit: [employer]     IBD CLINIC VISIT     CC/REFERRING MD:  Dr. Akin Bernal  REASON FOR FOLLOW UP: Small bowel Crohn's  COLLABORATING PHYSICIAN: Shiv Jones MD    CROHN'S HISTORY:  Age at diagnosis: 2012  Extent of disease: small bowel   Disease phenotype: inflammatory  Chayito-anal disease: none  Current CD medications: Ustekinumab every 8 weeks (started 5/2021)  Prior IBD surgeries: none  Prior IBD Medications: 50 mg 6MP    DRUG MONITORING  TPMT enzyme activity: 40.4 (6/14/2012)    6-TGN/6-MMPN levels: --    Biologic concentration:--    DISEASE ASSESSMENT  Labs:  No results found for: ALBUMIN  Recent Labs   Lab Test 04/15/21  0842   CRP 3.0   SED 8     Endoscopic assessment:   Colonoscopy 1/26/21 findings: On external exam, there were small perianal skin tags.  Normal mucosa was found in the entire colon. There was   mild scarring and retraction at the IC valve, but no   active colonic inflammation seen. Biopsies were taken   with a cold forceps for histology. Estimated blood   loss: none.  The ileum was deeply intubated to 30cm proximal to the   IC valve. Overall there were a few small apthous ulcer   seen, but overall normal appearing and not obstructed.   Biopsies were taken with a cold forceps for histology.   Estimated blood loss: none.    Impression: - Normal mucosa in the entire examined colon. Biopsied.  - Mild evidence of terminal ileum ulceration,   consistent  with her history of Crohn's disease.    A) Terminal ileum, biopsy:     Terminal ileal mucosa with erosion/ulceration with focus suggestive of a rare granuloma.     See comment.   B) Right colon, biopsy:     Colonic mucosa with few granulomas.       Enterography: 2/3/21 CTE There is a long segment of small bowel likely jejunum with concentric wall thickening which demonstrates improvement compared to November 2020. There is narrowing of the lumen and what appears to be an area of stenosis but no persistent proximal small bowel dilatation which is an improvement compared to previous study. Mucosal enhancement suggests an element of active Crohn's disease.  Fecal calprotectin: --   C diff: --    ASSESSMENT/PLAN  Ms. Lyon is a 30 year old year old female here to establish care for small bowel Crohn's disease.    1. Small bowel crohn's disease:    Current medication: Ustekinumab every 8 weeks  Current clinical disease activity: remission, HBI 0   Current endoscopic disease activity: colonoscopy 1/2021 showing mild disease with small ulcers in TI (no significant stricturing disease and allowed for deep intubation of ileum) on biopsies, and CTE showing a long segment of jejunal narrowing (seen in 2012 and 2020 in CTE) with concentric wall thickening (improved from 11/2020).     Feeling well on ustekinumab. We will repeat MRE now for new baseline since she has been on therapy x 6 months. If she were to have symptoms, then MRE and potential deep enteroscopy with Dr. Benavidez to dilate area, but overall goal is to minimize or avoid surgery.  -- Labs to include CBC, LFTs, CRP, ESR every 3-4 months    -- MRE   -- continue ustekinumab every 8 weeks    2. IBD healthcare maintenance based on patients current medication:    Vaccinations:  -- Influenza (every year)  -- TdaP (every 10 years)  -- Pneumococcal Pneumonia (once then every 5 years)  -- Yearly assessment for latent Tb (verbal screening and exam, PPD or QuantiFERON-Tb  testing): Will obtain    One time confirmation of immunity or serologies:  -- Hepatitis A (serologies or immunizations)  -- Hepatitis B (serologies or immunizations): serologies indicate immunity 6/2012  -- Varicella: --  -- MMR:--  -- HPV (all aged 18-26): --  -- Meningococcal meningitis (all patients at risk for meningitis): --   -- Due to the immunosuppression in this patient, I would not advise administration of live vaccines such as varicella/VZV, intranasal influenza, MMR, or yellow fever vaccine (if travelling).      Bone mineral density screening   -- Recommend all patients supplement with calcium and vitamin D  -- Given prior steroid use recommend DEXA if not already done    Cancer Screening:  Colon cancer screening:  Given disease is limited to small bowel, dysplasia screening is recommended at age 45-50 unless symptomatic sooner.    Cervical cancer screening: N/A    Skin cancer screening: Annual visual exam of skin by dermatologist since patient is immunocompromised    Depression Screening:  -- Over the last month, have you felt down, depressed, or hopeless? No  -- Over the last month, have you felt little interest or pleasure doing things? No    Misc:  -- Avoid tobacco use  -- Avoid NSAIDs as there is potentially a 25% chance of causing an IBD flare    RTC 6 months    Thank you for this consultation.  It was a pleasure to participate in the care of this patient; please contact us with any further questions.      Shady Caceres PA-C  Division of Gastroenterology, Hepatology and Nutrition  [facility]      HPI:   Ms. Lyon is a 31 year old year old female here to establish care for small bowel Crohn's disease.    Patient was diagnosed with small bowel Crohn's in [place] 6/2012 with colonscopy at [facility] when she established care with Dr. Bernal. She was started on 6MP and she did well after this time in combination with dietary changes. She then moved to [place] in 2017 and she  stopped 6MP on her own given her clinically stability. She reports she had a barium swallow study with GI provider in [place] and she notes that it was agreed she could continue without medication managed without medications (no endoscopic procedures at that time). She had a little overlap where she lived in [place] briefly before her move to [place] and had a flare in 11/2020 when she reestablished with Dr. Bernal and was hospitalized.  Hospitalization 11/2020 due to abdominal pain and pSBO. IV steroids given at this time followed by PO taper at discharge. Responded well to steroids. She started Ustekinumab 5/5/21.    Currently having 1 stools per day that is formed, no blood in the stool. No upper GI sxs (nausea or vomiting like previous). Notes on 1-2 occasions with mild abdominal cramp in the last year, resolved with heating pad otherwise no abdominal pain. No urgency or nighttime stools.     Left knee feels stiff and occasional pain (has large dog and wonders if he may have bumped into it).    She denies eye problems. She has no hx of EIM. She does note her nails are more brittle within the last month.    ROS:  Complete 10 System ROS performed. All are negative except as documented below, in the HPI, or in patient questionnaire from today's visit.    No fevers or chills  No weight loss  No blurry vision, double vision or change in vision  No sore throat  No lymphadenopathy  No headache, paraesthesias, or weakness in a limb  No shortness of breath or wheezing  No chest pain or pressure  + arthralgias, left knee  + brittle nails  No odynophagia or dysphagia  No BRBPR, hematochezia, melena  No dysuria, frequency or urgency  No hot/cold intolerance or polyria  No anxiety or depression    Extra intestinal manifestations of IBD:  No uveitis/episcleritis  No aphthous ulcers   No arthritis   No erythema nodosum/pyoderma gangrenosum.     PERTINENT PAST MEDICAL HISTORY:  Past Medical History:   Diagnosis Date     Crohn's  disease (H)      Crohn's disease (H) 4/8/2021       PREVIOUS SURGERIES:  Past Surgical History:   Procedure Laterality Date     NO HISTORY OF SURGERY         PREVIOUS ENDOSCOPY:  Result Impression     Findings:            The digital rectal exam was abnormal. Findings include                        non-thrombosed external hemorrhoids. The distal ileum                        contained a few ulcers. No bleeding was present.                        Biopsies were taken with a cold forceps for histology.                        Inflammation characterized by deep ulcerations and                        marked deformity was found on the ileo-cecal valve.                        This was moderate in severity. Biopsies were taken with                        a cold forceps for histology. Internal hemorrhoids were                        found during retroflexion and were small. No other                        significant abnormalities were identified in a careful                        examination of the remainder of the colon.    Impression:          - Rectal exam revealed non-thrombosed external                        hemorrhoids.                       - A few ulcers in the distal ileum. This was biopsied.                       - Inflammation, ulceration and marked deformity was                        found secondary to Crohn's disease on the ileocecal                        valve. This was biopsied.                       - Internal hemorrhoids.         ALLERGIES:     Allergies   Allergen Reactions     Other Environmental Allergy Itching and Visual Disturbance       PERTINENT MEDICATIONS:    Current Outpatient Medications:      ustekinumab (STELARA) 90 MG/ML, Inject 1 ml ( 90 mg) subcutaneous every 8 weeks.  First injection on 06/30/2021, Disp: 1 mL, Rfl: 5    SOCIAL HISTORY:  Social History     Socioeconomic History     Marital status: Single     Spouse name: Not on file     Number of children: Not on file     Years of  education: Not on file     Highest education level: Not on file   Occupational History     Not on file   Tobacco Use     Smoking status: Never Smoker     Smokeless tobacco: Never Used   Substance and Sexual Activity     Alcohol use: Yes     Drug use: Never     Sexual activity: Not Currently     Partners: Male     Birth control/protection: None   Other Topics Concern     Not on file   Social History Narrative     Not on file     Social Determinants of Health     Financial Resource Strain: Not on file   Food Insecurity: Not on file   Transportation Needs: Not on file   Physical Activity: Not on file   Stress: Not on file   Social Connections: Not on file   Intimate Partner Violence: Not on file   Housing Stability: Not on file       FAMILY HISTORY:  Cousin has UC  Family History   Problem Relation Age of Onset     Other - See Comments Mother         Hysterectomy     Hypertension Father      Cerebrovascular Disease Maternal Grandmother 50     Colon Cancer Maternal Grandfather      Melanoma Maternal Grandfather      Diabetes Type 1 Paternal Grandmother      Other - See Comments Paternal Grandmother         uterine prolapse     Heart Disease Paternal Grandfather 50        Heart attack       Past/family/social history reviewed and no changes    PHYSICAL EXAMINATION:  Constitutional: aaox3, cooperative, pleasant, not dyspneic/diaphoretic, no acute distress  Vitals reviewed: There were no vitals taken for this visit.  Wt:   Wt Readings from Last 2 Encounters:   05/05/21 58.8 kg (129 lb 11.2 oz)   03/29/21 54.4 kg (120 lb)      Constitutional - general appearance is well and in no acute distress. Body habitus normal  Eyes - No redness or discharge  Respiratory - No cough, unlabored breathing  Musculoskeletal - range of motion intact: Neck and arms  Skin - No discoloration or lesions  Neurological - No tremors, headaches  Psychiatric - No anxiety, alert & oriented    PERTINENT STUDIES:  Most recent CBC:  Recent Labs   Lab  Test 04/15/21  0842   WBC 8.0   HGB 12.9   HCT 39.4        Most recent hepatic panel:  Recent Labs   Lab Test 04/15/21  0842   ALT 18   AST 10     Most recent creatinine:  No lab results found.

## 2021-12-30 ENCOUNTER — LAB (OUTPATIENT)
Dept: LAB | Facility: CLINIC | Age: 31
End: 2021-12-30
Payer: COMMERCIAL

## 2021-12-30 DIAGNOSIS — K50.00 CROHN'S DISEASE OF SMALL INTESTINE WITHOUT COMPLICATION (H): ICD-10-CM

## 2021-12-30 LAB
BASOPHILS # BLD AUTO: 0.1 10E3/UL (ref 0–0.2)
BASOPHILS NFR BLD AUTO: 1 %
CRP SERPL-MCNC: <2.9 MG/L (ref 0–8)
EOSINOPHIL # BLD AUTO: 0.3 10E3/UL (ref 0–0.7)
EOSINOPHIL NFR BLD AUTO: 5 %
ERYTHROCYTE [DISTWIDTH] IN BLOOD BY AUTOMATED COUNT: 13.5 % (ref 10–15)
ERYTHROCYTE [SEDIMENTATION RATE] IN BLOOD BY WESTERGREN METHOD: 8 MM/HR (ref 0–20)
HCT VFR BLD AUTO: 40.2 % (ref 35–47)
HGB BLD-MCNC: 13.1 G/DL (ref 11.7–15.7)
IMM GRANULOCYTES # BLD: 0 10E3/UL
IMM GRANULOCYTES NFR BLD: 0 %
LYMPHOCYTES # BLD AUTO: 1.4 10E3/UL (ref 0.8–5.3)
LYMPHOCYTES NFR BLD AUTO: 24 %
MCH RBC QN AUTO: 27.9 PG (ref 26.5–33)
MCHC RBC AUTO-ENTMCNC: 32.6 G/DL (ref 31.5–36.5)
MCV RBC AUTO: 86 FL (ref 78–100)
MONOCYTES # BLD AUTO: 1 10E3/UL (ref 0–1.3)
MONOCYTES NFR BLD AUTO: 17 %
NEUTROPHILS # BLD AUTO: 2.9 10E3/UL (ref 1.6–8.3)
NEUTROPHILS NFR BLD AUTO: 53 %
NRBC # BLD AUTO: 0 10E3/UL
NRBC BLD AUTO-RTO: 0 /100
PLATELET # BLD AUTO: 302 10E3/UL (ref 150–450)
RBC # BLD AUTO: 4.69 10E6/UL (ref 3.8–5.2)
WBC # BLD AUTO: 5.6 10E3/UL (ref 4–11)

## 2021-12-30 PROCEDURE — 86140 C-REACTIVE PROTEIN: CPT

## 2021-12-30 PROCEDURE — 85652 RBC SED RATE AUTOMATED: CPT

## 2021-12-30 PROCEDURE — 36415 COLL VENOUS BLD VENIPUNCTURE: CPT

## 2021-12-30 PROCEDURE — 85025 COMPLETE CBC W/AUTO DIFF WBC: CPT

## 2022-01-17 ENCOUNTER — ANCILLARY PROCEDURE (OUTPATIENT)
Dept: MRI IMAGING | Facility: CLINIC | Age: 32
End: 2022-01-17
Attending: PHYSICIAN ASSISTANT
Payer: COMMERCIAL

## 2022-01-17 DIAGNOSIS — K50.00 CROHN'S DISEASE OF SMALL INTESTINE WITHOUT COMPLICATION (H): ICD-10-CM

## 2022-01-17 PROCEDURE — 74183 MRI ABD W/O CNTR FLWD CNTR: CPT | Performed by: RADIOLOGY

## 2022-01-17 PROCEDURE — A9585 GADOBUTROL INJECTION: HCPCS | Performed by: RADIOLOGY

## 2022-01-17 PROCEDURE — 72197 MRI PELVIS W/O & W/DYE: CPT | Performed by: RADIOLOGY

## 2022-01-17 RX ORDER — GADOBUTROL 604.72 MG/ML
7.5 INJECTION INTRAVENOUS ONCE
Status: COMPLETED | OUTPATIENT
Start: 2022-01-17 | End: 2022-01-17

## 2022-01-17 RX ADMIN — GADOBUTROL 6 ML: 604.72 INJECTION INTRAVENOUS at 14:30

## 2022-01-19 ENCOUNTER — TELEPHONE (OUTPATIENT)
Dept: GASTROENTEROLOGY | Facility: CLINIC | Age: 32
End: 2022-01-19
Payer: COMMERCIAL

## 2022-01-19 NOTE — TELEPHONE ENCOUNTER
[facility] Call Center    Phone Message    May a detailed message be left on voicemail: yes     Reason for Call: Other: Pt called in requesting a call back about imaging and want to talk about next steps. Please reach out. Thank you.     Action Taken: Message routed to:  [facility] ([facility]): rajesh gi    Travel Screening: Not Applicable

## 2022-01-20 ENCOUNTER — TELEPHONE (OUTPATIENT)
Dept: GASTROENTEROLOGY | Facility: CLINIC | Age: 32
End: 2022-01-20
Payer: COMMERCIAL

## 2022-01-20 ENCOUNTER — DOCUMENTATION ONLY (OUTPATIENT)
Dept: GASTROENTEROLOGY | Facility: CLINIC | Age: 32
End: 2022-01-20
Payer: COMMERCIAL

## 2022-01-20 DIAGNOSIS — K50.00 CROHN'S DISEASE OF SMALL INTESTINE WITHOUT COMPLICATION (H): Primary | ICD-10-CM

## 2022-01-20 NOTE — PROGRESS NOTES
I spoke with the patient over the phone regarding her MR enterography results.  I also spoke with Dr. Jones and we have come up with the following plan:    -- Patient will be getting an ustekinumab level on 2/8/2022.  If the level is 0, we will proceed with every 4-week dosing.  If she does have at least a level present, we could consider moving to every 6 weeks.  -- Once we make an adjustment in her medication, would recommend a repeat MR enterography in 6 months to a year.      Shady Caceres PA-C  Division of Gastroenterology, Hepatology and Nutrition  [facility]

## 2022-01-20 NOTE — TELEPHONE ENCOUNTER
----- Message from Sarah Ignacio RN sent at 1/20/2022  9:44 AM CST -----  Regarding: Stelara level  Hello  This patient needs a stelara level.  The order for the Stelara is 90 every 8 weeks   The order is in.   Please reach out to the patient to find out when she is due for her next injection and schedule the lab appointment as close as possible to her next injection date.    This needs to be completed at a [facility] lab.   And yesenia complete the [facility] lab form     Sarah

## 2022-01-20 NOTE — TELEPHONE ENCOUNTER
Patient did not answer, left message with date and time of lab appointment, also left clinic number [phone] if this does not work for patient. Also sent [employer] message.     Order placed for patient to have a Stelara level prior to next injection. Lab request scanned into patient chart.

## 2022-01-20 NOTE — TELEPHONE ENCOUNTER
Spoke with Marybel to let her know that the My Chart message has been sent to Shady.  Shayd had tried to call Marybel but they missed each other.

## 2022-02-08 ENCOUNTER — LAB (OUTPATIENT)
Dept: LAB | Facility: CLINIC | Age: 32
End: 2022-02-08
Payer: COMMERCIAL

## 2022-02-08 DIAGNOSIS — K50.00 CROHN'S DISEASE OF SMALL INTESTINE WITHOUT COMPLICATION (H): ICD-10-CM

## 2022-02-08 LAB
BASOPHILS # BLD AUTO: 0.1 10E3/UL (ref 0–0.2)
BASOPHILS NFR BLD AUTO: 1 %
CRP SERPL-MCNC: <2.9 MG/L (ref 0–8)
EOSINOPHIL # BLD AUTO: 0.2 10E3/UL (ref 0–0.7)
EOSINOPHIL NFR BLD AUTO: 4 %
ERYTHROCYTE [DISTWIDTH] IN BLOOD BY AUTOMATED COUNT: 13.2 % (ref 10–15)
ERYTHROCYTE [SEDIMENTATION RATE] IN BLOOD BY WESTERGREN METHOD: 7 MM/HR (ref 0–20)
HCT VFR BLD AUTO: 40.9 % (ref 35–47)
HGB BLD-MCNC: 13.2 G/DL (ref 11.7–15.7)
IMM GRANULOCYTES # BLD: 0 10E3/UL
IMM GRANULOCYTES NFR BLD: 1 %
LYMPHOCYTES # BLD AUTO: 1.5 10E3/UL (ref 0.8–5.3)
LYMPHOCYTES NFR BLD AUTO: 25 %
MCH RBC QN AUTO: 27.6 PG (ref 26.5–33)
MCHC RBC AUTO-ENTMCNC: 32.3 G/DL (ref 31.5–36.5)
MCV RBC AUTO: 86 FL (ref 78–100)
MONOCYTES # BLD AUTO: 0.5 10E3/UL (ref 0–1.3)
MONOCYTES NFR BLD AUTO: 9 %
NEUTROPHILS # BLD AUTO: 3.5 10E3/UL (ref 1.6–8.3)
NEUTROPHILS NFR BLD AUTO: 60 %
NRBC # BLD AUTO: 0 10E3/UL
NRBC BLD AUTO-RTO: 0 /100
PLATELET # BLD AUTO: 297 10E3/UL (ref 150–450)
RBC # BLD AUTO: 4.78 10E6/UL (ref 3.8–5.2)
WBC # BLD AUTO: 5.8 10E3/UL (ref 4–11)

## 2022-02-08 PROCEDURE — 99000 SPECIMEN HANDLING OFFICE-LAB: CPT | Performed by: PATHOLOGY

## 2022-02-08 PROCEDURE — 80299 QUANTITATIVE ASSAY DRUG: CPT | Mod: 90 | Performed by: PATHOLOGY

## 2022-02-08 PROCEDURE — 85652 RBC SED RATE AUTOMATED: CPT | Performed by: PATHOLOGY

## 2022-02-08 PROCEDURE — 36415 COLL VENOUS BLD VENIPUNCTURE: CPT | Performed by: PATHOLOGY

## 2022-02-08 PROCEDURE — 86140 C-REACTIVE PROTEIN: CPT | Performed by: PATHOLOGY

## 2022-02-08 PROCEDURE — 82542 COL CHROMOTOGRAPHY QUAL/QUAN: CPT | Mod: 90 | Performed by: PATHOLOGY

## 2022-02-08 PROCEDURE — 85025 COMPLETE CBC W/AUTO DIFF WBC: CPT | Performed by: PATHOLOGY

## 2022-02-10 ENCOUNTER — TRANSFERRED RECORDS (OUTPATIENT)
Dept: HEALTH INFORMATION MANAGEMENT | Facility: CLINIC | Age: 32
End: 2022-02-10
Payer: COMMERCIAL

## 2022-02-15 LAB — USTEKINUMAB CONCENTRATION: 1.6 UG/ML

## 2022-02-24 ENCOUNTER — PATIENT OUTREACH (OUTPATIENT)
Dept: GASTROENTEROLOGY | Facility: CLINIC | Age: 32
End: 2022-02-24
Payer: COMMERCIAL

## 2022-03-05 ASSESSMENT — ENCOUNTER SYMPTOMS
PALPITATIONS: 0
FEVER: 0
NAUSEA: 0
CHILLS: 0
ARTHRALGIAS: 0
HEMATOCHEZIA: 0
SORE THROAT: 0
COUGH: 0
HEARTBURN: 0
MYALGIAS: 0
DIZZINESS: 0
JOINT SWELLING: 0
NERVOUS/ANXIOUS: 0
SHORTNESS OF BREATH: 0
FREQUENCY: 0
DIARRHEA: 0
DYSURIA: 0
WEAKNESS: 0
PARESTHESIAS: 0
BREAST MASS: 0
EYE PAIN: 0
ABDOMINAL PAIN: 0
HEMATURIA: 0
HEADACHES: 0
CONSTIPATION: 0

## 2022-03-07 ENCOUNTER — OFFICE VISIT (OUTPATIENT)
Dept: FAMILY MEDICINE | Facility: CLINIC | Age: 32
End: 2022-03-07
Payer: COMMERCIAL

## 2022-03-07 ENCOUNTER — LAB (OUTPATIENT)
Dept: LAB | Facility: CLINIC | Age: 32
End: 2022-03-07

## 2022-03-07 VITALS
TEMPERATURE: 97.7 F | SYSTOLIC BLOOD PRESSURE: 131 MMHG | DIASTOLIC BLOOD PRESSURE: 87 MMHG | RESPIRATION RATE: 14 BRPM | OXYGEN SATURATION: 95 % | HEART RATE: 85 BPM | BODY MASS INDEX: 22.49 KG/M2 | HEIGHT: 65 IN | WEIGHT: 135 LBS

## 2022-03-07 DIAGNOSIS — K50.00 CROHN'S DISEASE OF SMALL INTESTINE WITHOUT COMPLICATION (H): ICD-10-CM

## 2022-03-07 DIAGNOSIS — Z91.89 AT HIGH RISK FOR OSTEOPOROSIS: ICD-10-CM

## 2022-03-07 DIAGNOSIS — Z00.00 ROUTINE GENERAL MEDICAL EXAMINATION AT A HEALTH CARE FACILITY: Primary | ICD-10-CM

## 2022-03-07 DIAGNOSIS — Z12.83 SKIN CANCER SCREENING: ICD-10-CM

## 2022-03-07 DIAGNOSIS — Z11.4 SCREENING FOR HIV (HUMAN IMMUNODEFICIENCY VIRUS): ICD-10-CM

## 2022-03-07 DIAGNOSIS — Z11.59 NEED FOR HEPATITIS C SCREENING TEST: ICD-10-CM

## 2022-03-07 PROBLEM — K50.112 CROHN'S DISEASE OF COLON WITH INTESTINAL OBSTRUCTION (H): Status: ACTIVE | Noted: 2020-11-23

## 2022-03-07 LAB
BASOPHILS # BLD AUTO: 0 10E3/UL (ref 0–0.2)
BASOPHILS NFR BLD AUTO: 1 %
CRP SERPL-MCNC: <2.9 MG/L (ref 0–8)
EOSINOPHIL # BLD AUTO: 0.2 10E3/UL (ref 0–0.7)
EOSINOPHIL NFR BLD AUTO: 3 %
ERYTHROCYTE [DISTWIDTH] IN BLOOD BY AUTOMATED COUNT: 13.3 % (ref 10–15)
ERYTHROCYTE [SEDIMENTATION RATE] IN BLOOD BY WESTERGREN METHOD: 7 MM/HR (ref 0–20)
HCT VFR BLD AUTO: 39.1 % (ref 35–47)
HGB BLD-MCNC: 12.6 G/DL (ref 11.7–15.7)
IMM GRANULOCYTES # BLD: 0 10E3/UL
IMM GRANULOCYTES NFR BLD: 0 %
LYMPHOCYTES # BLD AUTO: 1.5 10E3/UL (ref 0.8–5.3)
LYMPHOCYTES NFR BLD AUTO: 21 %
MCH RBC QN AUTO: 28.1 PG (ref 26.5–33)
MCHC RBC AUTO-ENTMCNC: 32.2 G/DL (ref 31.5–36.5)
MCV RBC AUTO: 87 FL (ref 78–100)
MONOCYTES # BLD AUTO: 0.8 10E3/UL (ref 0–1.3)
MONOCYTES NFR BLD AUTO: 11 %
NEUTROPHILS # BLD AUTO: 4.6 10E3/UL (ref 1.6–8.3)
NEUTROPHILS NFR BLD AUTO: 64 %
PLATELET # BLD AUTO: 308 10E3/UL (ref 150–450)
RBC # BLD AUTO: 4.49 10E6/UL (ref 3.8–5.2)
WBC # BLD AUTO: 7.2 10E3/UL (ref 4–11)

## 2022-03-07 PROCEDURE — 85652 RBC SED RATE AUTOMATED: CPT

## 2022-03-07 PROCEDURE — 99395 PREV VISIT EST AGE 18-39: CPT | Mod: 25 | Performed by: FAMILY MEDICINE

## 2022-03-07 PROCEDURE — 87389 HIV-1 AG W/HIV-1&-2 AB AG IA: CPT | Performed by: FAMILY MEDICINE

## 2022-03-07 PROCEDURE — 86803 HEPATITIS C AB TEST: CPT | Performed by: FAMILY MEDICINE

## 2022-03-07 PROCEDURE — 36415 COLL VENOUS BLD VENIPUNCTURE: CPT | Performed by: FAMILY MEDICINE

## 2022-03-07 PROCEDURE — 90471 IMMUNIZATION ADMIN: CPT | Performed by: FAMILY MEDICINE

## 2022-03-07 PROCEDURE — 90686 IIV4 VACC NO PRSV 0.5 ML IM: CPT | Performed by: FAMILY MEDICINE

## 2022-03-07 PROCEDURE — 86140 C-REACTIVE PROTEIN: CPT

## 2022-03-07 PROCEDURE — 85025 COMPLETE CBC W/AUTO DIFF WBC: CPT

## 2022-03-07 ASSESSMENT — ENCOUNTER SYMPTOMS
CHILLS: 0
DYSURIA: 0
HEMATOCHEZIA: 0
EYE PAIN: 0
JOINT SWELLING: 0
NERVOUS/ANXIOUS: 0
ABDOMINAL PAIN: 0
BREAST MASS: 0
NAUSEA: 0
MYALGIAS: 0
HEADACHES: 0
FREQUENCY: 0
DIARRHEA: 0
COUGH: 0
HEMATURIA: 0
CONSTIPATION: 0
ARTHRALGIAS: 0
PALPITATIONS: 0
PARESTHESIAS: 0
DIZZINESS: 0
SHORTNESS OF BREATH: 0
FEVER: 0
HEARTBURN: 0
SORE THROAT: 0
WEAKNESS: 0

## 2022-03-07 NOTE — PATIENT INSTRUCTIONS
1) Schedule a bone density test (DEXA scan)      To schedule any ordered imaging studies (including mammogram) you can call [facility] at [phone].  If you are scheduling a DEXA (bone density) scan please do NOT schedule this at the [facility].  Please ask that it be done at [facility] in [place].  Other preferred DEXA locations include [place] (at the [facility]), [place], or [place].        2) Schedule a skin cancer screening with dermatology     Preventive Health Recommendations  Female Ages 26 - 39  Yearly exam:   See your health care provider every year in order to    Review health changes.     Discuss preventive care.      Review your medicines if you your doctor has prescribed any.    Until age 30: Get a Pap test every three years (more often if you have had an abnormal result).    After age 30: Talk to your doctor about whether you should have a Pap test every 3 years or have a Pap test with HPV screening every 5 years.   You do not need a Pap test if your uterus was removed (hysterectomy) and you have not had cancer.  You should be tested each year for STDs (sexually transmitted diseases), if you're at risk.   Talk to your provider about how often to have your cholesterol checked.  If you are at risk for diabetes, you should have a diabetes test (fasting glucose).  Shots: Get a flu shot each year. Get a tetanus shot every 10 years.   Nutrition:     Eat at least 5 servings of fruits and vegetables each day.    Eat whole-grain bread, whole-wheat pasta and brown rice instead of white grains and rice.    Get adequate Calcium and Vitamin D.     Lifestyle    Exercise at least 150 minutes a week (30 minutes a day, 5 days of the week). This will help you control your weight and prevent disease.    Limit alcohol to one drink per day.    No smoking.     Wear sunscreen to prevent skin cancer.    See your dentist every six  months for an exam and cleaning.      Dietary Approaches to Stop Hypertension (The DASH Diet)   What is hypertension?   Hypertension is the term for blood pressure that is consistently higher than normal. Blood pressure is the force of blood against artery walls. Blood pressure can be unhealthy if it is above 120/80. The higher your blood pressure, the greater the health risk.     High blood pressure can be controlled if you take these steps:   Maintain a healthy weight.   Be physically active.   Follow a healthy eating plan, which includes foods lower in salt and sodium.   If you drink alcoholic beverages, do so in moderation.   As noted in this list, diet affects high blood pressure. Following the DASH diet and reducing the amount of sodium in your diet will help lower your blood pressure. It will also help prevent high blood pressure.     What is the DASH diet?   Dietary Approaches to Stop Hypertension (DASH) is a diet that is low in saturated fat, cholesterol, and total fat. It emphasizes fruits, vegetables, and low-fat dairy foods. The DASH diet also includes whole-grain products, fish, poultry, and nuts. It encourages fewer servings of red meat, sweets, and sugar-containing beverages. It is rich in magnesium, potassium, and calcium, as well as protein and fiber.     How do I get started on the DASH diet?   The DASH diet requires no special foods and has no hard-to-follow recipes. Start by seeing how DASH compares with your current eating habits.  The DASH eating plan shown is based on 2,000 calories a day. Your health care provider or a dietitian can help you determine how many calories a day you need. Most adults need somewhere between 1600 and 2800 calories a day. Serving sizes will vary between 1/2 cup and 1 1/4 cups.     Check the product's nutrition label to determine serving sizes of particular products.    Food Group   Number of Servings   Examples of Serving Size    Grains and grain products   7 to 8    1 slice of bread    1 cup ready-to-eat cold cereal    1/2 cup cooked rice, pasta, or   cereal    Vegetables   4 to 5   1 cup raw leafy vegetable    1/2 cup cooked vegetable    6 oz. vegetable juice    Fruits   4 to 5   1 medium fruit       1/4 cup dried fruit    1/2 cup fresh, frozen, or canned fruit    6 oz fruit juice    Low-fat or fat-free dairy foods   2 to 3   8 oz milk    1 cup yogurt    1 1/2 oz cheese    Lean meats, poultry, or fish   2 or fewer   3 oz cooked lean meat, skinless poultry, or fish    Nuts, seeds, and dry beans   4 to 5 per week   1/3 cup or 1 1/2 oz nuts    1 tablespoon or 1/2 oz seeds    1/2 cup cooked dry beans     Fats and oils   2 to 3   1 teaspoon soft margarine    1 tablespoon low-fat mayonnaise    2 tablespoons light salad dressing    1 teaspoon vegetable oil   Sweets   5 per week   1 tablespoon sugar              8 oz lemonade    1 tablespoon jelly or jam     1/2 oz jelly beans     Make changes gradually. Here are some suggestions that might help:     If you now eat 1 or 2 servings of vegetables a day, add a serving at lunch and another at dinner.   If you don't eat fruit now or have only juice at breakfast, add a serving to your meals or have it as a snack.   Drink milk or water with lunch or dinner instead of soda, sugar-sweetened tea, or alcohol. Choose low-fat (1%) or fat-free (skim) dairy products to reduce how much saturated fat, total fat, cholesterol, and calories you eat. If you have trouble digesting dairy products, try taking lactase enzyme pills or drops (available at drugstores and groceries) with the dairy foods. Or buy lactose-free milk or milk with lactase enzyme added to it.   Read food labels on margarines and salad dressings to choose products lowest in fat.   If you now eat large portions of meat, cut back gradually--by a half or a third at each meal. Limit meat to 6 ounces a day (2 servings). Three to four ounces is about the size of a deck of cards.   Have 2 or  more vegetarian-style (meatless) meals each week. Increase servings of vegetables, rice, pasta, and beans in all meals. Try casseroles and pasta, and stir-henry dishes, which have less meat and more vegetables, grains, and beans.   Use fruits canned in their own juice. Fresh fruits require little or no preparation. Dried fruits are a good choice to carry with you or to have ready in the car.   Try these snacks ideas: unsalted pretzels or nuts mixed with raisins, jayy crackers, low-fat and fat-free yogurt and frozen yogurt, popcorn with no salt or butter added, and raw vegetables.   Choose whole grain foods to get more nutrients, including minerals and fiber. For example, choose whole-wheat bread or whole-grain cereals.   Use fresh, frozen, or no-salt-added canned vegetables.     Remember to also reduce the salt and sodium in your diet. Try to have no more than 2000 milligrams (mg) of sodium per day, with a goal of further reducing the sodium to 1500 mg per day. Three important ways to reduce sodium are:     Use reduced-sodium or no-salt-added food products.   Use less salt when you prepare foods and do not add salt to your food at the table.   Read fool labels. Aim for foods that are less than 5 percent of the daily value of sodium.     The DASH eating plan was not designed for weight loss. But it contains many lower calorie foods, such as fruits and vegetables. You can make it lower in calories by replacing higher calorie foods with more fruits and vegetables. Some ideas to increase fruits and vegetables and decrease calories include:     Eat a medium apple instead of four shortbread cookies. You'll save 80 calories.   Eat 1/4 cup of dried apricots instead of a 2-ounce bag of pork rinds. You'll save 230 calories.   Have a hamburger that's 3 ounces instead of 6 ounces. Add a 1/2 cup serving of carrots and a 1/2 cup serving of spinach. You'll save more than 200 calories.   Instead of 5 ounces of chicken, have a stir  henry with 2 ounces of chicken and 1 and 1/2 cups of raw vegetables. Use a small amount of vegetable oil. You'll save 50 calories.   Have a 1/2 cup serving of low-fat frozen yogurt instead of a 1 and 1/2 ounce milk chocolate bar. You'll save about 110 calories.   Use low-fat or fat-free condiments, such as fat free salad dressings.   Eat smaller portions--cut back gradually.   Use food labels to compare fat content in packaged foods. Items marked low-fat or fat-free may be lower in fat without being lower in calories than their regular versions.   Limit foods with lots of added sugar, such as pies, flavored yogurts, candy bars, ice cream, sherbet, regular soft drinks, and fruit drinks.   Drink water or club soda instead of cola or other soda drinks.     Based on National Institutes of Health Guidelines. Published by [employer].   Copyright   2004 [employer] and/or one of its subsidiaries. All Rights Reserved.

## 2022-03-07 NOTE — PROGRESS NOTES
SUBJECTIVE:   CC: Marybel Lyon is an 31 year old woman who presents for preventive health visit.       Patient has been advised of split billing requirements and indicates understanding: Yes  Healthy Habits:     Getting at least 3 servings of Calcium per day:  Yes    Bi-annual eye exam:  Yes    Dental care twice a year:  Yes    Sleep apnea or symptoms of sleep apnea:  None    Diet:  Gluten-free/reduced    Frequency of exercise:  2-3 days/week    Duration of exercise:  15-30 minutes    Taking medications regularly:  Yes    Medication side effects:  None    PHQ-2 Total Score: 1    Additional concerns today:  No    Wonders if anything needs to be done for incidental finding of Bartholin cyst on CT.  Denies any pain in the area.    Managing her Crohn's well at this point.    She does get a regular skin cancer screening with dermatology.    Exercises regularly.  Keeps a healthy diet.    Social history:  Lives by herself with her dog.  Not currently in a relationship.  Works for [employer], which is why she relocated here from [place].  Grew up in the past. Likes her job.    Her dad had severe infection with Covid this year and was hospitalized in the ICU.  He is recovering now.    Today's PHQ-2 Score:   PHQ-2 ( 1999 Pf[employer]) 3/5/2022   Q1: Little interest or pleasure in doing things 1   Q2: Feeling down, depressed or hopeless 0   PHQ-2 Score 1   PHQ-2 Total Score (12-17 Years)- Positive if 3 or more points; Administer PHQ-A if positive -   Q1: Little interest or pleasure in doing things Several days   Q2: Feeling down, depressed or hopeless Not at all   PHQ-2 Score 1       Abuse: Current or Past (Physical, Sexual or Emotional) - No  Do you feel safe in your environment? Yes         Social History     Tobacco Use     Smoking status: Never Smoker     Smokeless tobacco: Never Used   Substance Use Topics     Alcohol use: Yes     If you drink alcohol do you typically have >3 drinks per day or >7 drinks  per week? No    Alcohol Use 3/7/2022   Prescreen: >3 drinks/day or >7 drinks/week? -   Prescreen: >3 drinks/day or >7 drinks/week? No       Reviewed orders with patient.  Reviewed health maintenance and updated orders accordingly - Yes       Breast Cancer Screening:    FHS-7:   Breast CA Risk Assessment (FHS-7) 3/5/2022   Did any of your first-degree relatives have breast or ovarian cancer? No   Did any of your relatives have bilateral breast cancer? No   Did any man in your family have breast cancer? No   Did any woman in your family have breast and ovarian cancer? No   Did any woman in your family have breast cancer before age 50 y? No   Do you have 2 or more relatives with breast and/or ovarian cancer? No   Do you have 2 or more relatives with breast and/or bowel cancer? No        Pertinent mammograms are reviewed under the imaging tab.    History of abnormal Pap smear:  Per her gynecologist age 30-65 PAP every 5 years with negative HPV co-testing recommended  PAP / HPV 3/24/2021   PAP (Historical) NIL     Reviewed and updated as needed this visit by clinical staff   Tobacco  Allergies  Meds   Med Hx  Surg Hx  Fam Hx  Soc Hx        Reviewed and updated as needed this visit by Provider                 Past Medical History:   Diagnosis Date     Crohn's disease (H)      Crohn's disease (H) 4/8/2021     Hypertension Dec 2020    Unsure it previously caused by steroid meds     Uncomplicated asthma     Used inhaler in childhood, no issues as adult      Past Surgical History:   Procedure Laterality Date     COLONOSCOPY       NO HISTORY OF SURGERY         Review of Systems   Constitutional: Negative for chills and fever.   HENT: Negative for congestion, ear pain, hearing loss and sore throat.    Eyes: Negative for pain and visual disturbance.   Respiratory: Negative for cough and shortness of breath.    Cardiovascular: Negative for chest pain, palpitations and peripheral edema.   Gastrointestinal: Negative for  "abdominal pain, constipation, diarrhea, heartburn, hematochezia and nausea.   Breasts:  Negative for tenderness, breast mass and discharge.   Genitourinary: Negative for dysuria, frequency, genital sores, hematuria, pelvic pain, urgency, vaginal bleeding and vaginal discharge.   Musculoskeletal: Negative for arthralgias, joint swelling and myalgias.   Skin: Negative for rash.   Neurological: Negative for dizziness, weakness, headaches and paresthesias.   Psychiatric/Behavioral: Negative for mood changes. The patient is not nervous/anxious.            OBJECTIVE:   /87 (BP Location: Left arm, Patient Position: Chair, Cuff Size: Adult Regular)   Pulse 85   Temp 97.7  F (36.5  C) (Temporal)   Resp 14   Ht 1.638 m (5' 4.5\")   Wt 61.2 kg (135 lb)   LMP 02/14/2022 (Approximate)   SpO2 95%   BMI 22.81 kg/m    Physical Exam  GENERAL: healthy, alert and no distress  EYES: Eyes grossly normal to inspection, PERRL and conjunctivae and sclerae normal  HENT: ear canals and TM's normal, nose and mouth without ulcers or lesions  NECK: no adenopathy, no asymmetry, masses, or scars and thyroid normal to palpation  RESP: lungs clear to auscultation - no rales, rhonchi or wheezes  CV: regular rate and rhythm, normal S1 S2, no S3 or S4, no murmur, click or rub, no peripheral edema and peripheral pulses strong  ABDOMEN: soft, nontender, no hepatosplenomegaly, no masses and bowel sounds normal  MS: no gross musculoskeletal defects noted, no edema  SKIN: no suspicious lesions or rashes  NEURO: Normal strength and tone, mentation intact and speech normal  PSYCH: mentation appears normal, affect normal/bright    Diagnostic Test Results:  Labs reviewed in Epic    ASSESSMENT/PLAN:       ICD-10-CM    1. Routine general medical examination at a health care facility  Z00.00    2. Crohn's disease of small intestine without complication (H)  K50.00    3. Need for hepatitis C screening test  Z11.59 Hepatitis C Screen Reflex to HCV RNA " Quant and Genotype   4. Screening for HIV (human immunodeficiency virus)  Z11.4 HIV Antigen Antibody Combo   5. At high risk for osteoporosis  Z91.89 DX Hip/Pelvis/Spine   6. Skin cancer screening  Z12.83     Routine general medical examination at a health care facility  Healthy  She has received both doses of the Moderna COVID-19 vaccine and a booster.     Flu shot recommended.  Schedule DEXA per GI recommendation due to prior steroid use  Completed her pap test with gynecology in 3/2021, repeat pap due in 2026     Crohn's disease of small intestine without complication (H)  Followed by GI.  Continues Stelara. Last colonoscopy was 1/2021 with GI visit in 12/21. Note reviewed today     Per GI visit note on 12/16/2021  1. Small bowel crohn's disease:    Current medication: Ustekinumab every 8 weeks  Current clinical disease activity: remission, HBI 0   Current endoscopic disease activity: colonoscopy 1/2021 showing mild disease with small ulcers in TI (no significant stricturing disease and allowed for deep intubation of ileum) on biopsies, and CTE showing a long segment of jejunal narrowing (seen in 2012 and 2020 in CTE) with concentric wall thickening (improved from 11/2020).      Feeling well on ustekinumab. We will repeat MRE now for new baseline since she has been on therapy x 6 months. If she were to have symptoms, then MRE and potential deep enteroscopy with Dr. Benavidez to dilate area, but overall goal is to minimize or avoid surgery.  -- Labs to include CBC, LFTs, CRP, ESR every 3-4 months    -- MRE   -- continue ustekinumab every 8 weeks     2. IBD healthcare maintenance based on patients current medication:    Vaccinations:  -- Influenza (every year)  -- TdaP (every 10 years)  -- Pneumococcal Pneumonia (once then every 5 years)  -- Yearly assessment for latent Tb (verbal screening and exam, PPD or QuantiFERON-Tb testing): Will obtain     One time confirmation of immunity or serologies:  -- Hepatitis A  "(serologies or immunizations)  -- Hepatitis B (serologies or immunizations): serologies indicate immunity 6/2012  -- Varicella: --  -- MMR:--  -- HPV (all aged 18-26): --  -- Meningococcal meningitis (all patients at risk for meningitis): --   -- Due to the immunosuppression in this patient, I would not advise administration of live vaccines such as varicella/VZV, intranasal influenza, MMR, or yellow fever vaccine (if travelling).       Bone mineral density screening   -- Recommend all patients supplement with calcium and vitamin D  -- Given prior steroid use recommend DEXA if not already done     Cancer Screening:  Colon cancer screening:  Given disease is limited to small bowel, dysplasia screening is recommended at age 45-50 unless symptomatic sooner.     Cervical cancer screening: N/A     Skin cancer screening: Annual visual exam of skin by dermatologist since patient is immunocompromised     Depression Screening:  -- Over the last month, have you felt down, depressed, or hopeless? No  -- Over the last month, have you felt little interest or pleasure doing things? No     Misc:  -- Avoid tobacco use  -- Avoid NSAIDs as there is potentially a 25% chance of causing an IBD flare     RTC 6 months\"           COUNSELING:  Reviewed preventive health counseling, as reflected in patient instructions    Estimated body mass index is 22.81 kg/m  as calculated from the following:    Height as of this encounter: 1.638 m (5' 4.5\").    Weight as of this encounter: 61.2 kg (135 lb).        She reports that she has never smoked. She has never used smokeless tobacco.      Counseling Resources:  ATP IV Guidelines  Pooled Cohorts Equation Calculator  Breast Cancer Risk Calculator  BRCA-Related Cancer Risk Assessment: FHS-7 Tool  FRAX Risk Assessment  ICSI Preventive Guidelines  Dietary Guidelines for Americans, 2010  [employer]'s MyPlate  ASA Prophylaxis  Lung CA Screening    Argenis Hanson MD   [facility]  "

## 2022-03-08 LAB
HCV AB SERPL QL IA: NONREACTIVE
HIV 1+2 AB+HIV1 P24 AG SERPL QL IA: NONREACTIVE

## 2022-03-08 NOTE — RESULT ENCOUNTER NOTE
Excellent! Please call or send a [employer] message if you have any questions. Argenis Hanson M.D.

## 2022-03-09 ENCOUNTER — VIRTUAL VISIT (OUTPATIENT)
Dept: GASTROENTEROLOGY | Facility: CLINIC | Age: 32
End: 2022-03-09
Payer: COMMERCIAL

## 2022-03-09 DIAGNOSIS — K50.90 CROHN'S DISEASE (H): ICD-10-CM

## 2022-03-09 DIAGNOSIS — K50.00 CROHN'S DISEASE OF SMALL INTESTINE WITHOUT COMPLICATION (H): Primary | ICD-10-CM

## 2022-03-09 PROCEDURE — 99214 OFFICE O/P EST MOD 30 MIN: CPT | Mod: 95 | Performed by: PHYSICIAN ASSISTANT

## 2022-03-09 RX ORDER — USTEKINUMAB 90 MG/ML
INJECTION, SOLUTION SUBCUTANEOUS
Qty: 1 ML | Refills: 5 | Status: SHIPPED | OUTPATIENT
Start: 2022-03-09 | End: 2023-01-02

## 2022-03-09 NOTE — LETTER
3/9/2022         RE: Marybel Lyon  [address]  [address]  [facility] [address]        Dear Colleague,    Thank you for referring your patient, Marybel Lyon, to the [facility] [place]. Please see a copy of my visit note below.     IBD CLINIC VISIT     CC/REFERRING MD:  Dr. Akin Bernal  REASON FOR FOLLOW UP: Small bowel Crohn's  COLLABORATING PHYSICIAN: Shiv Jones MD    CROHN'S HISTORY:  Age at diagnosis: 2011  Extent of disease: small bowel   Disease phenotype: inflammatory  Chayito-anal disease: none  Current CD medications: Ustekinumab every 8 weeks (started 5/2021)  Prior IBD surgeries: none  Prior IBD Medications: 50 mg 6MP    DRUG MONITORING  TPMT enzyme activity: 40.4 (6/14/2012)    6-TGN/6-MMPN levels: --    Biologic concentration:  Usteinumab 2/2022 = 1.6, moved to every 6 weeks     DISEASE ASSESSMENT  Labs:  No results found for: ALBUMIN  Recent Labs   Lab Test 03/07/22  1354 02/08/22  0941   CRP <2.9 <2.9   SED 7 7     Endoscopic assessment:   Colonoscopy 1/26/21 findings: On external exam, there were small perianal skin tags.  Normal mucosa was found in the entire colon. There was   mild scarring and retraction at the IC valve, but no   active colonic inflammation seen. Biopsies were taken   with a cold forceps for histology. Estimated blood   loss: none.  The ileum was deeply intubated to 30cm proximal to the   IC valve. Overall there were a few small apthous ulcer   seen, but overall normal appearing and not obstructed.   Biopsies were taken with a cold forceps for histology.   Estimated blood loss: none.    Impression: - Normal mucosa in the entire examined colon. Biopsied.  - Mild evidence of terminal ileum ulceration,   consistent with her history of Crohn's disease.    A) Terminal ileum, biopsy:     Terminal ileal mucosa with erosion/ulceration with focus suggestive of a rare granuloma.     See comment.   B) Right colon, biopsy:      Colonic mucosa with few granulomas.       Enterography: 2/3/21 CTE There is a long segment of small bowel likely jejunum with concentric wall thickening which demonstrates improvement compared to November 2020. There is narrowing of the lumen and what appears to be an area of stenosis but no persistent proximal small bowel dilatation which is an improvement compared to previous study. Mucosal enhancement suggests an element of active Crohn's disease.  Fecal calprotectin: --   C diff: --    ASSESSMENT/PLAN  Ms. Lyon is a 31 year old year old female here to establish care for small bowel Crohn's disease.    1. Small bowel crohn's disease:    Current medication: Ustekinumab every 6 weeks (has yet to change to this interval)  Current clinical disease activity: remission, HBI 0   Current endoscopic disease activity: colonoscopy 1/2021 showing mild disease with small ulcers in TI (no significant stricturing disease and allowed for deep intubation of ileum) on biopsies, and MRE 1/17/22 with moderate segmental length of ileum (10cm approx) with diffuse wall thickening consistent with acute and chronic inflammation.    Despite MRE findings, feeling well on ustekinumab. Level at 1.6 and we have recommended to move to every 6 week dosing. After 6 months on new interval, will determine if a repeat MRE may be necessary to determine an additional adjustment. If she were to have symptoms, then MRE and potential deep enteroscopy with Dr. Benavidez to dilate area, but overall goal is to minimize or avoid surgery.  -- Labs to include CBC, LFTs, CRP, ESR every 3-4 months    -- Change ustekinumab to every 6 weeks  -- GI health psych referral per patient request  -- DEXA scan when able    2. IBD healthcare maintenance based on patients current medication:    Vaccinations:  -- Influenza (every year)  -- TdaP (every 10 years)  -- Pneumococcal Pneumonia (once then every 5 years)  -- Yearly assessment for latent Tb (verbal screening and  exam, PPD or QuantiFERON-Tb testing): Will obtain    One time confirmation of immunity or serologies:  -- Hepatitis A (serologies or immunizations)  -- Hepatitis B (serologies or immunizations): serologies indicate immunity 6/2012  -- Varicella: --  -- MMR:--  -- HPV (all aged 18-26): --  -- Meningococcal meningitis (all patients at risk for meningitis): --   -- Due to the immunosuppression in this patient, I would not advise administration of live vaccines such as varicella/VZV, intranasal influenza, MMR, or yellow fever vaccine (if travelling).      Bone mineral density screening   -- Recommend all patients supplement with calcium and vitamin D  -- Given prior steroid use recommend DEXA if not already done    Cancer Screening:  Colon cancer screening:  Given disease is limited to small bowel, dysplasia screening is recommended at age 45-50 unless symptomatic sooner.    Cervical cancer screening: N/A    Skin cancer screening: Annual visual exam of skin by dermatologist since patient is immunocompromised    Depression Screening:  -- Over the last month, have you felt down, depressed, or hopeless? No  -- Over the last month, have you felt little interest or pleasure doing things? No    Misc:  -- Avoid tobacco use  -- Avoid NSAIDs as there is potentially a 25% chance of causing an IBD flare    RTC 6 months    Thank you for this consultation.  33 minutes spent on the date of the encounter doing chart review, history and exam, documentation and further activities as noted above.  It was a pleasure to participate in the care of this patient; please contact us with any further questions.      Shady Caceres PA-C  Division of Gastroenterology, Hepatology and Nutrition  [facility]        HPI:   Ms. Lyon is a 31 year old year old female here to establish care for small bowel Crohn's disease.    Patient was diagnosed with small bowel Crohn's in [place] 6/2012 with colonscopy at [facility] when she  established care with Dr. Bernal. She was started on 6MP and she did well after this time in combination with dietary changes. She then moved to [place] in 2017 and she stopped 6MP on her own given her clinically stability. She reports she had a barium swallow study with GI provider in [place] and she notes that it was agreed she could continue without medication managed without medications (no endoscopic procedures at that time). She had a little overlap where she lived in [place] briefly before her move to [place] and had a flare in 11/2020 when she reestablished with Dr. Bernal and was hospitalized.  Hospitalization 11/2020 due to abdominal pain and pSBO. IV steroids given at this time followed by PO taper at discharge. Responded well to steroids. She started Ustekinumab 5/5/21.    Currently having 1 stools per day that is formed, no blood in the stool. No upper GI sxs (nausea or vomiting like previous). No abdominal pain. No urgency or nighttime stools.     No joint pain.  She denies eye problems. She has no hx of EIM.    ROS:  Complete 10 System ROS performed. All are negative except as documented below, in the HPI, or in patient questionnaire from today's visit.    No fevers or chills  No weight loss  No blurry vision, double vision or change in vision  No sore throat  No lymphadenopathy  No headache, paraesthesias, or weakness in a limb  No shortness of breath or wheezing  No chest pain or pressure  + arthralgias, left knee  No skin changes  No odynophagia or dysphagia  No BRBPR, hematochezia, melena  No dysuria, frequency or urgency  No hot/cold intolerance or polyria  No anxiety or depression    Extra intestinal manifestations of IBD:  No uveitis/episcleritis  No aphthous ulcers   No arthritis   No erythema nodosum/pyoderma gangrenosum.     PERTINENT PAST MEDICAL HISTORY:  Past Medical History:   Diagnosis Date     Crohn's disease (H)      Crohn's disease (H) 4/8/2021     Hypertension Dec 2020    Unsure it  previously caused by steroid meds     Uncomplicated asthma     Used inhaler in childhood, no issues as adult       PREVIOUS SURGERIES:  Past Surgical History:   Procedure Laterality Date     COLONOSCOPY       NO HISTORY OF SURGERY         PREVIOUS ENDOSCOPY:  Result Impression     Findings:            The digital rectal exam was abnormal. Findings include                        non-thrombosed external hemorrhoids. The distal ileum                        contained a few ulcers. No bleeding was present.                        Biopsies were taken with a cold forceps for histology.                        Inflammation characterized by deep ulcerations and                        marked deformity was found on the ileo-cecal valve.                        This was moderate in severity. Biopsies were taken with                        a cold forceps for histology. Internal hemorrhoids were                        found during retroflexion and were small. No other                        significant abnormalities were identified in a careful                        examination of the remainder of the colon.    Impression:          - Rectal exam revealed non-thrombosed external                        hemorrhoids.                       - A few ulcers in the distal ileum. This was biopsied.                       - Inflammation, ulceration and marked deformity was                        found secondary to Crohn's disease on the ileocecal                        valve. This was biopsied.                       - Internal hemorrhoids.         ALLERGIES:     Allergies   Allergen Reactions     Other Environmental Allergy Itching and Visual Disturbance       PERTINENT MEDICATIONS:    Current Outpatient Medications:      ustekinumab (STELARA) 90 MG/ML, Inject 1 ml ( 90 mg) subcutaneous every 8 weeks.  First injection on 06/30/2021, Disp: 1 mL, Rfl: 5    SOCIAL HISTORY:  Social History     Socioeconomic History     Marital status: Single      Spouse name: Not on file     Number of children: Not on file     Years of education: Not on file     Highest education level: Not on file   Occupational History     Not on file   Tobacco Use     Smoking status: Never Smoker     Smokeless tobacco: Never Used   Substance and Sexual Activity     Alcohol use: Yes     Drug use: Never     Sexual activity: Not Currently     Partners: Male     Birth control/protection: Condom, None   Other Topics Concern     Parent/sibling w/ CABG, MI or angioplasty before 65F 55M? No   Social History Narrative     Not on file     Social Determinants of Health     Financial Resource Strain: Not on file   Food Insecurity: Not on file   Transportation Needs: Not on file   Physical Activity: Not on file   Stress: Not on file   Social Connections: Not on file   Intimate Partner Violence: Not on file   Housing Stability: Not on file       FAMILY HISTORY:  Cousin has UC  Family History   Problem Relation Age of Onset     Other - See Comments Mother         Hysterectomy     Hypertension Father      Cerebrovascular Disease Maternal Grandmother 50     Colon Cancer Maternal Grandfather         60s     Melanoma Maternal Grandfather      Diabetes Type 1 Paternal Grandmother      Other - See Comments Paternal Grandmother         uterine prolapse     Heart Disease Paternal Grandfather 50        Heart attack     Hypertension Cousin        Past/family/social history reviewed and no changes    PHYSICAL EXAMINATION:  Constitutional: aaox3, cooperative, pleasant, not dyspneic/diaphoretic, no acute distress  Vitals reviewed: LMP 02/14/2022 (Approximate)   Wt:   Wt Readings from Last 2 Encounters:   03/07/22 61.2 kg (135 lb)   05/05/21 58.8 kg (129 lb 11.2 oz)      Constitutional - general appearance is well and in no acute distress. Body habitus normal  Eyes - No redness or discharge  Respiratory - No cough, unlabored breathing  Musculoskeletal - range of motion intact: Neck and arms  Skin - No discoloration  or lesions  Neurological - No tremors, headaches  Psychiatric - No anxiety, alert & oriented    PERTINENT STUDIES:  Most recent CBC:  Recent Labs   Lab Test 03/07/22  1354 02/08/22  0941   WBC 7.2 5.8   HGB 12.6 13.2   HCT 39.1 40.9    297     Most recent hepatic panel:  Recent Labs   Lab Test 04/15/21  0842   ALT 18   AST 10     Most recent creatinine:  No lab results found.        Shady Caceres PA-C

## 2022-03-09 NOTE — PROGRESS NOTES
Shady Caceres PA-C Pitzl, Andrea Jo, PA-C; Jennifer Encarnacion RN; Marcela Cerda, [facility]  Good morning!     Just had a visit with Marybel. Can we make the switch to every 6 weeks if not done so already?     Thank you!!   Shady Patricio placed per message above.

## 2022-03-09 NOTE — PROGRESS NOTES
Marybel is a 31 year old who is being evaluated via a billable video visit.       Patient confirms medications and allergies are accurate via patients echeck in completion, and or denies any changes since last reviewed/verified.     Lisa Norris, Virtual Facilitator     How would you like to obtain your AVS? MyChart  If the video visit is dropped, the invitation should be resent by: Send to e-mail at: [email or web address]  Will anyone else be joining your video visit? No       Lisa Norris    Video Start Time: 8:21 AM  Video-Visit Details    Type of service:  Video Visit    Video End Time:8:46 AM    Originating Location (pt. Location): Home    Distant Location (provider location):  [facility] [place]     Platform used for Video Visit: Well     IBD CLINIC VISIT     CC/REFERRING MD:  Dr. Akin Bernal  REASON FOR FOLLOW UP: Small bowel Crohn's  COLLABORATING PHYSICIAN: Shiv Jones MD    CROHN'S HISTORY:  Age at diagnosis: 2011  Extent of disease: small bowel   Disease phenotype: inflammatory  Chayito-anal disease: none  Current CD medications: Ustekinumab every 8 weeks (started 5/2021)  Prior IBD surgeries: none  Prior IBD Medications: 50 mg 6MP    DRUG MONITORING  TPMT enzyme activity: 40.4 (6/14/2012)    6-TGN/6-MMPN levels: --    Biologic concentration:  Usteinumab 2/2022 = 1.6, moved to every 6 weeks     DISEASE ASSESSMENT  Labs:  No results found for: ALBUMIN  Recent Labs   Lab Test 03/07/22  1354 02/08/22  0941   CRP <2.9 <2.9   SED 7 7     Endoscopic assessment:   Colonoscopy 1/26/21 findings: On external exam, there were small perianal skin tags.  Normal mucosa was found in the entire colon. There was   mild scarring and retraction at the IC valve, but no   active colonic inflammation seen. Biopsies were taken   with a cold forceps for histology. Estimated blood   loss: none.  The ileum was deeply intubated to 30cm proximal to the   IC valve. Overall there were a few small  apthous ulcer   seen, but overall normal appearing and not obstructed.   Biopsies were taken with a cold forceps for histology.   Estimated blood loss: none.    Impression: - Normal mucosa in the entire examined colon. Biopsied.  - Mild evidence of terminal ileum ulceration,   consistent with her history of Crohn's disease.    A) Terminal ileum, biopsy:     Terminal ileal mucosa with erosion/ulceration with focus suggestive of a rare granuloma.     See comment.   B) Right colon, biopsy:     Colonic mucosa with few granulomas.       Enterography: 2/3/21 CTE There is a long segment of small bowel likely jejunum with concentric wall thickening which demonstrates improvement compared to November 2020. There is narrowing of the lumen and what appears to be an area of stenosis but no persistent proximal small bowel dilatation which is an improvement compared to previous study. Mucosal enhancement suggests an element of active Crohn's disease.  Fecal calprotectin: --   C diff: --    ASSESSMENT/PLAN  Ms. Lyon is a 31 year old year old female here to establish care for small bowel Crohn's disease.    1. Small bowel crohn's disease:    Current medication: Ustekinumab every 6 weeks (has yet to change to this interval)  Current clinical disease activity: remission, HBI 0   Current endoscopic disease activity: colonoscopy 1/2021 showing mild disease with small ulcers in TI (no significant stricturing disease and allowed for deep intubation of ileum) on biopsies, and MRE 1/17/22 with moderate segmental length of ileum (10cm approx) with diffuse wall thickening consistent with acute and chronic inflammation.    Despite MRE findings, feeling well on ustekinumab. Level at 1.6 and we have recommended to move to every 6 week dosing. After 6 months on new interval, will determine if a repeat MRE may be necessary to determine an additional adjustment. If she were to have symptoms, then MRE and potential deep enteroscopy with   Amateau to dilate area, but overall goal is to minimize or avoid surgery.  -- Labs to include CBC, LFTs, CRP, ESR every 3-4 months    -- Change ustekinumab to every 6 weeks  -- GI health psych referral per patient request  -- DEXA scan when able    2. IBD healthcare maintenance based on patients current medication:    Vaccinations:  -- Influenza (every year)  -- TdaP (every 10 years)  -- Pneumococcal Pneumonia (once then every 5 years)  -- Yearly assessment for latent Tb (verbal screening and exam, PPD or QuantiFERON-Tb testing): Will obtain    One time confirmation of immunity or serologies:  -- Hepatitis A (serologies or immunizations)  -- Hepatitis B (serologies or immunizations): serologies indicate immunity 6/2012  -- Varicella: --  -- MMR:--  -- HPV (all aged 18-26): --  -- Meningococcal meningitis (all patients at risk for meningitis): --   -- Due to the immunosuppression in this patient, I would not advise administration of live vaccines such as varicella/VZV, intranasal influenza, MMR, or yellow fever vaccine (if travelling).      Bone mineral density screening   -- Recommend all patients supplement with calcium and vitamin D  -- Given prior steroid use recommend DEXA if not already done    Cancer Screening:  Colon cancer screening:  Given disease is limited to small bowel, dysplasia screening is recommended at age 45-50 unless symptomatic sooner.    Cervical cancer screening: N/A    Skin cancer screening: Annual visual exam of skin by dermatologist since patient is immunocompromised    Depression Screening:  -- Over the last month, have you felt down, depressed, or hopeless? No  -- Over the last month, have you felt little interest or pleasure doing things? No    Misc:  -- Avoid tobacco use  -- Avoid NSAIDs as there is potentially a 25% chance of causing an IBD flare    RTC 6 months    Thank you for this consultation.  33 minutes spent on the date of the encounter doing chart review, history and exam,  documentation and further activities as noted above.  It was a pleasure to participate in the care of this patient; please contact us with any further questions.      Shady Caceres PA-C  Division of Gastroenterology, Hepatology and Nutrition  [facility]        HPI:   Ms. Lyon is a 31 year old year old female here to establish care for small bowel Crohn's disease.    Patient was diagnosed with small bowel Crohn's in [place] 6/2012 with colonscopy at [facility] when she established care with Dr. Bernal. She was started on 6MP and she did well after this time in combination with dietary changes. She then moved to [place] in 2017 and she stopped 6MP on her own given her clinically stability. She reports she had a barium swallow study with GI provider in [place] and she notes that it was agreed she could continue without medication managed without medications (no endoscopic procedures at that time). She had a little overlap where she lived in [place] briefly before her move to [place] and had a flare in 11/2020 when she reestablished with Dr. Bernal and was hospitalized.  Hospitalization 11/2020 due to abdominal pain and pSBO. IV steroids given at this time followed by PO taper at discharge. Responded well to steroids. She started Ustekinumab 5/5/21.    Currently having 1 stools per day that is formed, no blood in the stool. No upper GI sxs (nausea or vomiting like previous). No abdominal pain. No urgency or nighttime stools.     No joint pain.  She denies eye problems. She has no hx of EIM.    ROS:  Complete 10 System ROS performed. All are negative except as documented below, in the HPI, or in patient questionnaire from today's visit.    No fevers or chills  No weight loss  No blurry vision, double vision or change in vision  No sore throat  No lymphadenopathy  No headache, paraesthesias, or weakness in a limb  No shortness of breath or wheezing  No chest pain or pressure  + arthralgias, left knee  No  skin changes  No odynophagia or dysphagia  No BRBPR, hematochezia, melena  No dysuria, frequency or urgency  No hot/cold intolerance or polyria  No anxiety or depression    Extra intestinal manifestations of IBD:  No uveitis/episcleritis  No aphthous ulcers   No arthritis   No erythema nodosum/pyoderma gangrenosum.     PERTINENT PAST MEDICAL HISTORY:  Past Medical History:   Diagnosis Date     Crohn's disease (H)      Crohn's disease (H) 4/8/2021     Hypertension Dec 2020    Unsure it previously caused by steroid meds     Uncomplicated asthma     Used inhaler in childhood, no issues as adult       PREVIOUS SURGERIES:  Past Surgical History:   Procedure Laterality Date     COLONOSCOPY       NO HISTORY OF SURGERY         PREVIOUS ENDOSCOPY:  Result Impression     Findings:            The digital rectal exam was abnormal. Findings include                        non-thrombosed external hemorrhoids. The distal ileum                        contained a few ulcers. No bleeding was present.                        Biopsies were taken with a cold forceps for histology.                        Inflammation characterized by deep ulcerations and                        marked deformity was found on the ileo-cecal valve.                        This was moderate in severity. Biopsies were taken with                        a cold forceps for histology. Internal hemorrhoids were                        found during retroflexion and were small. No other                        significant abnormalities were identified in a careful                        examination of the remainder of the colon.    Impression:          - Rectal exam revealed non-thrombosed external                        hemorrhoids.                       - A few ulcers in the distal ileum. This was biopsied.                       - Inflammation, ulceration and marked deformity was                        found secondary to Crohn's disease on the ileocecal                         valve. This was biopsied.                       - Internal hemorrhoids.         ALLERGIES:     Allergies   Allergen Reactions     Other Environmental Allergy Itching and Visual Disturbance       PERTINENT MEDICATIONS:    Current Outpatient Medications:      ustekinumab (STELARA) 90 MG/ML, Inject 1 ml ( 90 mg) subcutaneous every 8 weeks.  First injection on 06/30/2021, Disp: 1 mL, Rfl: 5    SOCIAL HISTORY:  Social History     Socioeconomic History     Marital status: Single     Spouse name: Not on file     Number of children: Not on file     Years of education: Not on file     Highest education level: Not on file   Occupational History     Not on file   Tobacco Use     Smoking status: Never Smoker     Smokeless tobacco: Never Used   Substance and Sexual Activity     Alcohol use: Yes     Drug use: Never     Sexual activity: Not Currently     Partners: Male     Birth control/protection: Condom, None   Other Topics Concern     Parent/sibling w/ CABG, MI or angioplasty before 65F 55M? No   Social History Narrative     Not on file     Social Determinants of Health     Financial Resource Strain: Not on file   Food Insecurity: Not on file   Transportation Needs: Not on file   Physical Activity: Not on file   Stress: Not on file   Social Connections: Not on file   Intimate Partner Violence: Not on file   Housing Stability: Not on file       FAMILY HISTORY:  Cousin has UC  Family History   Problem Relation Age of Onset     Other - See Comments Mother         Hysterectomy     Hypertension Father      Cerebrovascular Disease Maternal Grandmother 50     Colon Cancer Maternal Grandfather         60s     Melanoma Maternal Grandfather      Diabetes Type 1 Paternal Grandmother      Other - See Comments Paternal Grandmother         uterine prolapse     Heart Disease Paternal Grandfather 50        Heart attack     Hypertension Cousin        Past/family/social history reviewed and no changes    PHYSICAL  EXAMINATION:  Constitutional: aaox3, cooperative, pleasant, not dyspneic/diaphoretic, no acute distress  Vitals reviewed: LMP 02/14/2022 (Approximate)   Wt:   Wt Readings from Last 2 Encounters:   03/07/22 61.2 kg (135 lb)   05/05/21 58.8 kg (129 lb 11.2 oz)      Constitutional - general appearance is well and in no acute distress. Body habitus normal  Eyes - No redness or discharge  Respiratory - No cough, unlabored breathing  Musculoskeletal - range of motion intact: Neck and arms  Skin - No discoloration or lesions  Neurological - No tremors, headaches  Psychiatric - No anxiety, alert & oriented    PERTINENT STUDIES:  Most recent CBC:  Recent Labs   Lab Test 03/07/22  1354 02/08/22  0941   WBC 7.2 5.8   HGB 12.6 13.2   HCT 39.1 40.9    297     Most recent hepatic panel:  Recent Labs   Lab Test 04/15/21  0842   ALT 18   AST 10     Most recent creatinine:  No lab results found.

## 2022-03-11 ENCOUNTER — TELEPHONE (OUTPATIENT)
Dept: PSYCHOLOGY | Facility: CLINIC | Age: 32
End: 2022-03-11
Payer: COMMERCIAL

## 2022-03-18 ENCOUNTER — ANCILLARY PROCEDURE (OUTPATIENT)
Dept: BONE DENSITY | Facility: CLINIC | Age: 32
End: 2022-03-18
Attending: FAMILY MEDICINE
Payer: COMMERCIAL

## 2022-03-18 DIAGNOSIS — Z91.89 AT HIGH RISK FOR OSTEOPOROSIS: ICD-10-CM

## 2022-03-18 PROCEDURE — 77080 DXA BONE DENSITY AXIAL: CPT

## 2022-03-28 NOTE — RESULT ENCOUNTER NOTE
Excellent! Please call or send a [employer] message if you have any questions. Argenis Hanson M.D.

## 2022-04-30 ENCOUNTER — PATIENT OUTREACH (OUTPATIENT)
Dept: DERMATOLOGY | Facility: CLINIC | Age: 32
End: 2022-04-30
Payer: COMMERCIAL

## 2022-04-30 NOTE — LETTER
April 30, 2022          Marybel Lyon  [address]  [address] [place] [address] [address]  [facility] [address]        Dear Marybel Lyon:    We recently reviewed your medical records from [facility] and found that you are due for an appointment with Dr. Lela Hartman.  Our office has attempted to reach you via telephone and left a message.    At your earliest convenience, please call the clinic at [phone] to arrange the recommended exam and possible testing.  Please kindly mention this letter when calling so that your appointment(s) can be accurately scheduled.      Sincerely,       The Clinic Staff        Medical Record Number:  [id number]

## 2022-04-30 NOTE — CONFIDENTIAL NOTE
Attempted to reach patient to schedule follow up in the Dermatology Clinic.  No answer,  LM on VM to call office and Letter mailed.    Schedule with Dr. Lela Hartman- myrna skin check.

## 2022-05-03 ENCOUNTER — VIRTUAL VISIT (OUTPATIENT)
Dept: GASTROENTEROLOGY | Facility: CLINIC | Age: 32
End: 2022-05-03
Attending: PHYSICIAN ASSISTANT
Payer: COMMERCIAL

## 2022-05-03 DIAGNOSIS — F43.20 ADJUSTMENT DISORDER, UNSPECIFIED TYPE: Primary | ICD-10-CM

## 2022-05-03 PROCEDURE — 90791 PSYCH DIAGNOSTIC EVALUATION: CPT | Mod: 95 | Performed by: PSYCHOLOGIST

## 2022-05-03 NOTE — LETTER
5/3/2022         RE: Marybel Lyon  [address]  [address]  [facility] [address]        Dear Colleague,    Thank you for referring your patient, Marybel Lyon, to the [facility] [place]. Please see a copy of my visit note below.    This telehealth service is appropriate and effective for delivering services in light of the necessity for social distancing to mitigate the COVID-19 epidemic and for conservation of PPE.     Patient has agreed to receiving telehealth services after being informed about it: Yes    Patient prefers video invitation/information to be sent by:   email    Time service started: 3:00 PM  Time service ended: 3:37 PM    Mode of transmission: [employer]    Location of originating:  Home of the patient    Distance site:  Home office of provider for [facility]    The patient has been notified that:  Video visits will be conducted via a call with their psychologist to provide the care they need with a video conversation. Video visits may be billed at different rates depending on insurance coverage.  Patients are advised to please contact their insurance provider with any questions about their health insurance coverage. If during the course of a call the psychologist feels a video visit is not appropriate, patients will not be charged for this service.        Confidential Summary of Standard Psychodiagnostic Evaluation*    Referral Source:  Shady Caceres PA-C, [facility]    Reason for Referral:  Adjustment and coping with chronic illness    Sources of Information:  Information was obtained from a clinical interview with the patient, review of available medical records, and administration of psychological assessments.     Informed Consent:  Informed consent included a review of the nature and purpose of the assessment, billing, and confidentiality and limits thereof. Discussed role of GI psychologist in  multidisciplinary GI care team in and documentation of visit in EMR.     History of Presenting Concerns:  Marybel Lyon is a 31 year old with medical history significant for Crohn's disease diagnosed approximately 10 years ago.  She presents wishing to establish care with a mental health provider who can help cope and adjust to life with chronic illness.  She endorsed experiencing a range of emotions and reactions following her diagnosis approximately 10 years ago.  However she anticipates benefiting from having a space to emotionally process the impact of chronic illness given the lifelong impacts of this condition.  When diagnosed shortly after her kailash year from college she initially responded with a range of emotions including denial, anger and resentment towards her diagnosis.  She also endorsed having difficulty accepting the need to take medications over time to manage IBD and hope to find a diet only management approach.  Her most recent flare was in November 2020 during which she experienced severe abdominal pain and small bowel obstruction, narrowly missing need for emergent surgery.  She discussed fear of medications to manage diabetes failing, resulting in escalation of medications in the future.  Also discussed anticipation of needing to consider IBD in the context of future events such as family-planning.  Goal of care would be to process the previous and future impacts of chronic illness and learn how to regain a sense of control and living well with IBD.    Medical History:    Past Medical History:   Diagnosis Date     Crohn's disease (H)      Crohn's disease (H) 4/8/2021     Hypertension Dec 2020    Unsure it previously caused by steroid meds     Uncomplicated asthma     Used inhaler in childhood, no issues as adult       Past Surgical History:   Procedure Laterality Date     COLONOSCOPY       NO HISTORY OF SURGERY         Current Outpatient Medications   Medication     ustekinumab (STELARA)  90 MG/ML     No current facility-administered medications for this visit.       Patient Care Team:  No Ref-Primary, Physician as PCP - Zabrina Atkinson MD as Resident (OB/Gyn)  Lela Hartman MD as Assigned Surgical Provider  Argenis Hanson MD as Assigned PCP  Shady Caceres PA-C as Assigned Gastroenterology Provider     Current Health Behaviors  Alcohol use: She described social alcohol use  Drug use: No current drug use reported   tobacco use: No current tobacco use reported      Psychiatric History:  Past diagnoses: No previous psychiatric diagnosis reported  Current symptoms:  Outpatient treatment: Utilize psychotherapy to help cope prior to breaking up with her ex fiancé several years ago as well as following the break-up of their engagement.  Psychotropic medication use: No reported history  Inpatient treatment: No reported history  Family history: No reported family history    Substance Use History:  No personal substance use endorsed    Social History:    Current Living Arrangements: Currently lives alone    Relationship Status/Family/Children: Is in a committed dating relationship.  Finds that her current partner is very supportive and accommodating regarding inflammatory bowel disease.     Social Support: Endorses good social support with friendships     Developmental History: Raised in the [place] with 1 sibling and by her mother and father.  Described a supportive positive growing up.  She endorsed her mother to be a major support and coping with IBD.     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         Psychological Assessment:  General Psychosocial Functioning  The patient completed the following battery of assessments during this psychological evaluation: World Health Organization Disability Assessment Schedule 2.0 12-item (WHODAS), Patient Health Questionnaire-9  (PHQ-9), Generalized Anxiety Disorder-7 screener (ZAINA-7), and the CAGE Questionnaire Adapted to Include Drugs (CAGE-AID).    The WHODAS measures disability and functional impairment due to health conditions including diseases, illnesses, injuries, mental or emotional problems, and problems with alcohol or drugs. The possible range of scores is 12-60 and higher scores indicate higher levels of disability.   No flowsheet data found.    The PHQ-9 is an instrument for screening, diagnosing, monitoring and measuring the severity of depression. Scores of 5, 10, 15, and 20 represent cutpoints for mild, moderate, moderately severe and severe depression, respectively.   PHQ-9 SCORE 3/24/2021   PHQ-9 Total Score 2       The ZAINA-7 is an instrument for screening, diagnosing, monitoring and measuring the severity of anxiety. Scores of 5, 10, and 15 represent cutpoints for mild, moderate, and severe anxiety, respectively.  ZAINA-7 SCORE 3/24/2021   Total Score 2       The CAGE-AID questionnaire is used to screen for alcohol or drug abuse and dependence in adults. A CAGE-AID score  > 1 is a positive screen, suggesting further discussion is needed to determine if evaluation for alcohol or substance abuse is appropriate. A score > 2 is considered clinically significant, suggesting further evaluation of alcohol or substance-related problems is indicated.  No flowsheet data found.      Mental Status Examination:  Appearance/Behavior/Orientation: Patient was on time, appropriately groomed and dressed, and demonstrated good eye contact. Alert and oriented to person, place, time, and situation. No evidence of psychomotor agitation.     Cooperation/Reliability: Patient was open and cooperative throughout the session.    Speech/Language: Speech was clear, coherent, and of normal rate, rhythm and volume.   Thought Form: Overall logical and organized.   Thought Content: Appropriate to interview and situation.  Cognition/Memory: Not formally  assessed, but no difficulties apparent upon interview.   Attention/Concentration: Good throughout interview.    Fund of knowledge: Consistent with age and level of education.    Abstract reasoning: Not assessed.   Judgment: Intact.    Mood/Affect: Mood euthymic; appropriate range of affect.    Insight/Motivation: Good, good  Suicide/Assault: Patient denies suicidal or assaultive ideation, plan, or intent.    Impression and Plan  Marybel Lyon is a 31 year old female who endorses adjustment difficulties living with chronic illness.  Would benefit from establishing care with GI health psychology to process the emotional impact of chronic illness and develop strategies to effectively self manage disease and its emotional impact. Recommended patient establish care with GI health psychology sessions approximately every 3 to 4 weeks to improve coping with emotional impact of and self-management of IBD.  She agreed with recommendations.  A treatment plan will be completed at the next check.      Diagnosis:  Adjustment disorder, unspecified         Comfort Lovell, PhD,   Clinical Health Psychologist    *In accordance with the Rules of the [place] Board of [employer], it is noted that psychological descriptions and scientific procedures underlying psychological evaluations have limitations.  Absolute predictions cannot be made based on information in this report.    *no letter    This note was completed using Dragon voice recognition software.  Although reviewed after completion, some word and grammatical errors may occur.

## 2022-05-03 NOTE — PROGRESS NOTES
This telehealth service is appropriate and effective for delivering services in light of the necessity for social distancing to mitigate the COVID-19 epidemic and for conservation of PPE.     Patient has agreed to receiving telehealth services after being informed about it: Yes    Patient prefers video invitation/information to be sent by:   email    Time service started: 3:00 PM  Time service ended: 3:37 PM    Mode of transmission: [employer]    Location of originating:  Home of the patient    Distance site:  Home office of provider for [facility]    The patient has been notified that:  Video visits will be conducted via a call with their psychologist to provide the care they need with a video conversation. Video visits may be billed at different rates depending on insurance coverage.  Patients are advised to please contact their insurance provider with any questions about their health insurance coverage. If during the course of a call the psychologist feels a video visit is not appropriate, patients will not be charged for this service.        Confidential Summary of Standard Psychodiagnostic Evaluation*    Referral Source:  Shady Caceres PA-C, [facility]    Reason for Referral:  Adjustment and coping with chronic illness    Sources of Information:  Information was obtained from a clinical interview with the patient, review of available medical records, and administration of psychological assessments.     Informed Consent:  Informed consent included a review of the nature and purpose of the assessment, billing, and confidentiality and limits thereof. Discussed role of GI psychologist in multidisciplinary GI care team in and documentation of visit in EMR.     History of Presenting Concerns:  Marybel Lyon is a 31 year old with medical history significant for Crohn's disease diagnosed approximately 10 years ago.  She presents wishing to establish care with a mental health provider who can  help cope and adjust to life with chronic illness.  She endorsed experiencing a range of emotions and reactions following her diagnosis approximately 10 years ago.  However she anticipates benefiting from having a space to emotionally process the impact of chronic illness given the lifelong impacts of this condition.  When diagnosed shortly after her kailash year from college she initially responded with a range of emotions including denial, anger and resentment towards her diagnosis.  She also endorsed having difficulty accepting the need to take medications over time to manage IBD and hope to find a diet only management approach.  Her most recent flare was in November 2020 during which she experienced severe abdominal pain and small bowel obstruction, narrowly missing need for emergent surgery.  She discussed fear of medications to manage diabetes failing, resulting in escalation of medications in the future.  Also discussed anticipation of needing to consider IBD in the context of future events such as family-planning.  Goal of care would be to process the previous and future impacts of chronic illness and learn how to regain a sense of control and living well with IBD.    Medical History:    Past Medical History:   Diagnosis Date     Crohn's disease (H)      Crohn's disease (H) 4/8/2021     Hypertension Dec 2020    Unsure it previously caused by steroid meds     Uncomplicated asthma     Used inhaler in childhood, no issues as adult       Past Surgical History:   Procedure Laterality Date     COLONOSCOPY       NO HISTORY OF SURGERY         Current Outpatient Medications   Medication     ustekinumab (STELARA) 90 MG/ML     No current facility-administered medications for this visit.       Patient Care Team:  No Ref-Primary, Physician as PCP - General  Zabrina Fermin MD as Resident (OB/Gyn)  Lela Hartman MD as Assigned Surgical Provider  Argenis Hanson MD as Assigned PCP  Shady Caceres PA-C as  Assigned Gastroenterology Provider     Current Health Behaviors  Alcohol use: She described social alcohol use  Drug use: No current drug use reported   tobacco use: No current tobacco use reported      Psychiatric History:  Past diagnoses: No previous psychiatric diagnosis reported  Current symptoms:  Outpatient treatment: Utilize psychotherapy to help cope prior to breaking up with her ex fiancé several years ago as well as following the break-up of their engagement.  Psychotropic medication use: No reported history  Inpatient treatment: No reported history  Family history: No reported family history    Substance Use History:  No personal substance use endorsed    Social History:    Current Living Arrangements: Currently lives alone    Relationship Status/Family/Children: Is in a committed dating relationship.  Finds that her current partner is very supportive and accommodating regarding inflammatory bowel disease.     Social Support: Endorses good social support with friendships     Developmental History: Ra[place] with 1 sibling and by her mother and father.  Described a supportive positive growing up.  She endorsed her mother to be a major support and coping with IBD.     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         Psychological Assessment:  General Psychosocial Functioning  The patient completed the following battery of assessments during this psychological evaluation: World Health Organization Disability Assessment Schedule 2.0 12-item (WHODAS), Patient Health Questionnaire-9 (PHQ-9), Generalized Anxiety Disorder-7 screener (ZAINA-7), and the CAGE Questionnaire Adapted to Include Drugs (CAGE-AID).    The WHODAS measures disability and functional impairment due to health conditions including diseases, illnesses, injuries, mental or emotional problems, and problems with alcohol or  drugs. The possible range of scores is 12-60 and higher scores indicate higher levels of disability.   No flowsheet data found.    The PHQ-9 is an instrument for screening, diagnosing, monitoring and measuring the severity of depression. Scores of 5, 10, 15, and 20 represent cutpoints for mild, moderate, moderately severe and severe depression, respectively.   PHQ-9 SCORE 3/24/2021   PHQ-9 Total Score 2       The ZAINA-7 is an instrument for screening, diagnosing, monitoring and measuring the severity of anxiety. Scores of 5, 10, and 15 represent cutpoints for mild, moderate, and severe anxiety, respectively.  ZAINA-7 SCORE 3/24/2021   Total Score 2       The CAGE-AID questionnaire is used to screen for alcohol or drug abuse and dependence in adults. A CAGE-AID score  > 1 is a positive screen, suggesting further discussion is needed to determine if evaluation for alcohol or substance abuse is appropriate. A score > 2 is considered clinically significant, suggesting further evaluation of alcohol or substance-related problems is indicated.  No flowsheet data found.      Mental Status Examination:  Appearance/Behavior/Orientation: Patient was on time, appropriately groomed and dressed, and demonstrated good eye contact. Alert and oriented to person, place, time, and situation. No evidence of psychomotor agitation.     Cooperation/Reliability: Patient was open and cooperative throughout the session.    Speech/Language: Speech was clear, coherent, and of normal rate, rhythm and volume.   Thought Form: Overall logical and organized.   Thought Content: Appropriate to interview and situation.  Cognition/Memory: Not formally assessed, but no difficulties apparent upon interview.   Attention/Concentration: Good throughout interview.    Fund of knowledge: Consistent with age and level of education.    Abstract reasoning: Not assessed.   Judgment: Intact.    Mood/Affect: Mood euthymic; appropriate range of affect.     Insight/Motivation: Good, good  Suicide/Assault: Patient denies suicidal or assaultive ideation, plan, or intent.    Impression and Plan  Marybel Lyon is a 31 year old female who endorses adjustment difficulties living with chronic illness.  Would benefit from establishing care with GI health psychology to process the emotional impact of chronic illness and develop strategies to effectively self manage disease and its emotional impact. Recommended patient establish care with GI health psychology sessions approximately every 3 to 4 weeks to improve coping with emotional impact of and self-management of IBD.  She agreed with recommendations.  A treatment plan will be completed at the next check.      Diagnosis:  Adjustment disorder, unspecified         Comfort Lovell, PhD,   Clinical Health Psychologist    *In accordance with the Rules of the [place] Board of [employer], it is noted that psychological descriptions and scientific procedures underlying psychological evaluations have limitations.  Absolute predictions cannot be made based on information in this report.    *no letter    This note was completed using Dragon voice recognition software.  Although reviewed after completion, some word and grammatical errors may occur.

## 2022-05-16 ENCOUNTER — TELEPHONE (OUTPATIENT)
Dept: OBGYN | Facility: CLINIC | Age: 32
End: 2022-05-16
Payer: COMMERCIAL

## 2022-05-16 DIAGNOSIS — Z30.012 EMERGENCY CONTRACEPTION: Primary | ICD-10-CM

## 2022-05-16 RX ORDER — LEVONORGESTREL 1.5 MG/1
1.5 TABLET ORAL ONCE
Qty: 1 TABLET | Refills: 0 | Status: SHIPPED | OUTPATIENT
Start: 2022-05-16 | End: 2022-06-09

## 2022-05-16 NOTE — TELEPHONE ENCOUNTER
Marybel is on day 2 of her menstrual cycle.    Had contraceptive failure last night, and is wondering if she should take Plan B    Discussed when ovulation typically occurs in menstrual cycle, but that if she wishes to prevent an unplanned pregnancy, plan B would be recommended.    She would like rx sent to pharmacy.      Done per protocol

## 2022-05-16 NOTE — TELEPHONE ENCOUNTER
----- Message from Mayra Crespo RN sent at 5/16/2022 11:56 AM CDT -----  Regarding: Pt has Questions

## 2022-06-06 ASSESSMENT — ANXIETY QUESTIONNAIRES
1. FEELING NERVOUS, ANXIOUS, OR ON EDGE: SEVERAL DAYS
5. BEING SO RESTLESS THAT IT IS HARD TO SIT STILL: NOT AT ALL
2. NOT BEING ABLE TO STOP OR CONTROL WORRYING: NOT AT ALL
GAD7 TOTAL SCORE: 2
6. BECOMING EASILY ANNOYED OR IRRITABLE: NOT AT ALL
7. FEELING AFRAID AS IF SOMETHING AWFUL MIGHT HAPPEN: NOT AT ALL
3. WORRYING TOO MUCH ABOUT DIFFERENT THINGS: NOT AT ALL
8. IF YOU CHECKED OFF ANY PROBLEMS, HOW DIFFICULT HAVE THESE MADE IT FOR YOU TO DO YOUR WORK, TAKE CARE OF THINGS AT HOME, OR GET ALONG WITH OTHER PEOPLE?: NOT DIFFICULT AT ALL
GAD7 TOTAL SCORE: 2
7. FEELING AFRAID AS IF SOMETHING AWFUL MIGHT HAPPEN: NOT AT ALL
GAD7 TOTAL SCORE: 2
4. TROUBLE RELAXING: SEVERAL DAYS

## 2022-06-06 ASSESSMENT — ENCOUNTER SYMPTOMS
DECREASED LIBIDO: 0
HOT FLASHES: 0

## 2022-06-06 NOTE — PROGRESS NOTES
"Subjective:  Marybel Lyon is a 32 year old female who presents today for her Annual Exam.    HPI:   - Wondering about a \"bartholin's gland cyst\" that was seen on her recent imaging for Chron's disease in 02/03/2021  - Wondering about fertility given her age, considering becoming pregnant in the next few years and wants to be sure that \"everything is ok\".   - Had contraceptive failure on 5/16/22 and took plan B that day, was using condoms which broke. Would like a ParaGard IUD placed today.     Menses:  Patient's last menstrual period was 05/08/2022 (exact date).   Periods are regular q 28-30 days, flow heavy on first 1-2 days then tapers.   Dysmenorrhea mild, occurring first 1-2 days of flow  Cyclic symptoms include  NONE  Additional symptoms include NONE    Contraception:  Current contraception: condoms  Number of partners in last year: 1, male partner.   Desires STD testing: Yes.     Mood: Felt very anxious when she took plan B last month, denies feelings of anxiety and depression otherwise.     Healthy lifestyle:  Abuse: No   Safety Concerns: No   Regular Exercise: Yes. Running, piliates 3-4x weekly.   Sunscreen Use: Yes  Healthy Diet: Yes    Past Screenings:  Health Maintenance   Topic Date Due     ADVANCE CARE PLANNING  Never done     PREVENTIVE CARE VISIT  06/09/2023     PAP  03/24/2026     DTAP/TDAP/TD IMMUNIZATION (7 - Td or Tdap) 09/22/2026     HEPATITIS C SCREENING  Completed     HIV SCREENING  Completed     PHQ-2 (once per calendar year)  Completed     INFLUENZA VACCINE  Completed     MENINGITIS IMMUNIZATION  Completed     HEPATITIS B IMMUNIZATION  Completed     COVID-19 Vaccine  Completed     Pneumococcal Vaccine: Pediatrics (0 to 5 Years) and At-Risk Patients (6 to 64 Years)  Aged Out     IPV IMMUNIZATION  Aged Out       PAP smear history:  Lab Results   Component Value Date    PAP NIL 03/24/2021      History of abnormal Pap smear: Yes    ASCUS, HPV: HR positive (9/2016)  Colposcopy 10/20/2016 " "normal.   Following this she had Pap smear in 2017 that was normal.   Lab Results   Component Value Date    PAP NIL 2021     Last mammogram:  Mammogram current: not applicable    Last Colonoscopy:  10/2021 - has had multiple d/t diagnosis of Chron's disease    Lipids:  No results found for: CHOL  No results found for: HDL  No results found for: LDL    Thyroid:  No results found for: TSH    Immunizations:  Immunization History   Administered Date(s) Administered     COVID-19,PF,Moderna 2021, 2021, 2021     DT (PEDS <7y) 2005     DTAP (<7y) 1996     HPV Quadrivalent 10/22/2006, 2006, 2007     HepB-Adult 1994, 1994, 1995     Hib, Unspecified 1991, 1991     Historical DTP/aP 1991, 1991, 1991, 1993     Influenza Vaccine IM > 6 months Valent IIV4 (Alfuria,Fluzone) 2016, 2020, 2022     MMR 1993, 1996     Meningococcal (Menactra ) 2007     Poliovirus, inactivated (IPV) 1991, 1991, 1993     Tdap (Adacel,Boostrix) 2016       Reviewed and updated history    Objective:  BP (!) 135/90   Pulse 97   Ht 1.674 m (5' 5.9\")   Wt 56.1 kg (123 lb 9.6 oz)   LMP 2022 (Exact Date)   BMI 20.01 kg/m       History:  Prescription Medications as of 2022       Rx Number Disp Refills Start End Last Dispensed Date Next Fill Date Owning Pharmacy    [facility] intrauterine copper device        [place], [place] - [address] [facility] [id number]    Si each by Intrauterine route once    Class: Historical    Route: Intrauterine    ustekinumab (STELARA) 90 MG/ML  1 mL 5 3/9/2022    [place], [place] - [address] [place] [address]    Sig: Inject 1 ml ( 90 mg) subcutaneous every 6 weeks.    Class: E-Prescribe      Clinic-Administered Medications as of 2022       Dose Frequency Start End    paragard intrauterine copper IUD device 1 each " (Completed) 1 Device ONCE 2022    Class: No Print Out    Route: Intrauterine        Allergies   Allergen Reactions     Other Environmental Allergy Itching and Visual Disturbance       OB History    Para Term  AB Living   0 0 0 0 0 0   SAB IAB Ectopic Multiple Live Births   0 0 0 0 0     Past Medical History:   Diagnosis Date     Abnormal Pap smear of cervix      Autoimmune disease (H) 2012    Crohns     Crohn's disease (H)      Crohn's disease (H) 2021     Hypertension 2020    Unsure it previously caused by steroid meds     Uncomplicated asthma     Used inhaler in childhood, no issues as adult     Past Surgical History:   Procedure Laterality Date     COLONOSCOPY       NO HISTORY OF SURGERY       Family History   Problem Relation Age of Onset     Other - See Comments Mother         Hysterectomy     Hypertension Father      Cerebrovascular Disease Maternal Grandmother 50     Colon Cancer Maternal Grandfather         60s     Melanoma Maternal Grandfather      Diabetes Type 1 Paternal Grandmother      Other - See Comments Paternal Grandmother         uterine prolapse     Heart Disease Paternal Grandfather 50        Heart attack     Hypertension Cousin      Social History     Socioeconomic History     Marital status: Single   Tobacco Use     Smoking status: Never Smoker     Smokeless tobacco: Never Used   Substance and Sexual Activity     Alcohol use: Yes     Drug use: Never     Sexual activity: Not Currently     Partners: Male     Birth control/protection: Condom, None   Other Topics Concern     Parent/sibling w/ CABG, MI or angioplasty before 65F 55M? No       ROS:  ROS: 10 point ROS neg other than the symptoms noted above in the HPI.   PHQ 3/24/2021   PHQ-9 Total Score 2   Q9: Thoughts of better off dead/self-harm past 2 weeks Not at all      ZAINA-7 SCORE 3/24/2021 2022   Total Score - 2 (minimal anxiety)   Total Score 2 2        Physical Exam:  Blood pressure (!) 135/90,  "pulse 97, height 1.674 m (5' 5.9\"), weight 56.1 kg (123 lb 9.6 oz), last menstrual period 05/08/2022, not currently breastfeeding. Body mass index is 20.01 kg/m .  General - pleasant female in no acute distress.  Skin - no suspicious lesions or rashes  EENT-  PERRLA, euthyroid with out palpable nodules  Neck - supple without lymphadenopathy.  Lungs - clear to auscultation bilaterally.  Heart - regular rate and rhythm without murmur.  Abdomen - soft, nontender, nondistended, no masses or organomegaly noted.  Musculoskeletal - no gross deformities.  Neurological - normal strength, sensation, and mental status.    Breast Exam:  Breast: Without visible skin changes. No dimpling or lesions seen.   Breasts supple, non-tender with palpation, no dominant mass, nodularity, or nipple discharge noted bilaterally. Axillary nodes negative.      Pelvic Exam:  EG/BUS: Normal genital architecture without lesions, erythema or abnormal secretions Bartholin's, Urethra, [place]'s normal   Urethral meatus: normal   Urethra: no masses, tenderness, or scarring   Bladder: no masses or tenderness   Vagina: moist, pink, rugae with physiologic discharge  secretions  Cervix: Nulliparous,, no lesions and pink, moist, closed, without lesion or CMT  Uterus: retroverted,  and small, smooth, firm, mobile w/o pain  Adnexa: Within normal limits and No masses, nodularity, tenderness  Rectum:anus normal       Assessment:  Encounter Diagnoses   Name Primary?     Encounter for insertion of intrauterine contraceptive device      Visit for preventive health examination Yes     Screening examination for venereal disease      Screening for thyroid disorder      IUD (intrauterine device) in place      ASCUS with positive high risk HPV cervical      Elevated blood pressure reading without diagnosis of hypertension         Plan:  Orders Placed This Encounter   Procedures     INSERTION INTRAUTERINE DEVICE     TSH with free T4 reflex     hCG qual urine POCT    "   Orders Placed This Encounter   Medications     paragard intrauterine copper device     Si each by Intrauterine route once     paragard intrauterine copper IUD device 1 each       - Additional teaching done at this visit included: birth control and preconception  - Discussed with regular periods no reason to be concerned with fertility at this time, offered to draw TSH pt accepts. May use at home ovulation test kits if desired to gain mor information about cycles.   - Reviewed elevated BP today, was also abnormal at last annual exam. Recommend follow-up in 3-6 months with family medicine provider.   - Pap due .     RTC in one year for annual exam or sooner with concerns.    AASHISH Mendoza CNM    Answers for HPI/ROS submitted by the patient on 2022  ZAINA 7 TOTAL SCORE: 2  General Symptoms: No  Skin Symptoms: No  HENT Symptoms: No  EYE SYMPTOMS: No  HEART SYMPTOMS: No  LUNG SYMPTOMS: No  INTESTINAL SYMPTOMS: No  URINARY SYMPTOMS: No  GYNECOLOGIC SYMPTOMS: Yes  BREAST SYMPTOMS: No  SKELETAL SYMPTOMS: No  BLOOD SYMPTOMS: No  NERVOUS SYSTEM SYMPTOMS: No  MENTAL HEALTH SYMPTOMS: No  Bleeding or spotting between periods: No  Heavy or painful periods: No  Irregular periods: Yes  Vaginal discharge: Yes  Hot flashes: No  Vaginal dryness: No  Genital ulcers: No  Reduced libido: No  Painful intercourse: No  Difficulty with sexual arousal: No  Post-menopausal bleeding: No    IUD Insertion:  CONSULT:    Is a pregnancy test required: Yes.  Was it positive or negative?  Negative  Was a consent obtained?  Yes    Subjective: Marybel Lyon is a 32 year old  presents for IUD and desires Paragard type IUD.    Patient has been given the opportunity to ask questions about all forms of birth control, including all options appropriate for Marybel Lyon. Discussed that no method of birth control, except abstinence is 100% effective against pregnancy or sexually transmitted infection.     Marybel Lyon  "understands she may have the IUD removed at any time. IUD should be removed by a health care provider.    The entire insertion procedure was reviewed with the patient, including care after placement.    Patient's last menstrual period was 05/08/2022 (exact date). Last sexual activity: 4 days ago, used condoms. . No allergy to betadine or shellfish. Patient desires STD screening  HCG Qual Urine   Date Value Ref Range Status   06/09/2022 Negative Negative Final       BP (!) 135/90   Pulse 97   Ht 1.674 m (5' 5.9\")   Wt 56.1 kg (123 lb 9.6 oz)   LMP 05/08/2022 (Exact Date)   BMI 20.01 kg/m      Pelvic Exam:   EG/BUS: normal genital architecture without lesions, erythema or abnormal secretions.   Vagina: moist, pink, rugae with physiologic discharge and secretions  Cervix: nulliparous no lesions and pink, moist, closed, without lesion or CMT  Uterus: retroverted position, mobile, no pain  Adnexa: within normal limits and no masses, nodularity, tenderness    PROCEDURE NOTE: -- IUD Insertion    Reason for Insertion: contraception    Premedicated with tylenol  Under sterile technique, cervix was visualized with speculum and prepped with Betadine solution swab x 3. Tenaculum was placed for stability. The uterus was gently straightened and sounded to 6.0 cm. IUD prepared for placement, and IUD inserted according to 's instructions without difficulty or significant resitance, and deployed at the fundus. The strings were visualized and trimmed to 2.0 cm from the external os. Tenaculum was removed and hemostasis noted. Speculum removed.  Patient tolerated procedure well.    Lot # [id number]  Exp: 01/2028    EBL: minimal    Complications: none    ASSESSMENT:     ICD-10-CM    1. Visit for preventive health examination  Z00.00 Neisseria gonorrhoeae PCR Swab [[id number]]     Chlamydia trachomatis PCR Swab     TSH with free T4 reflex   2. Encounter for insertion of intrauterine contraceptive device  Z30.430 paragard " intrauterine copper device     paragard intrauterine copper IUD device 1 each     INSERTION INTRAUTERINE DEVICE     hCG qual urine POCT   3. Screening examination for venereal disease  Z11.3 Neisseria gonorrhoeae PCR Swab [[id number]]     Chlamydia trachomatis PCR Swab   4. Screening for thyroid disorder  Z13.29 TSH with free T4 reflex   5. IUD (intrauterine device) in place  Z97.5    6. ASCUS with positive high risk HPV cervical  R87.610     R87.810    7. Elevated blood pressure reading without diagnosis of hypertension  R03.0         PLAN:    Given 's handouts, including when to have IUD removed, list of danger s/sx, side effects and follow up recommended. Encouraged condom use for prevention of STD. Back up contraception advised for 7 days if progestin method. Advised to call for any fever, for prolonged or severe pain or bleeding, abnormal vaginal discharge, or unable to palpate strings. She was advised to use pain medications (ibuprofen) as needed for mild to moderate pain. Advised to follow-up in clinic in 4-6 weeks for IUD string check if unable to find strings or as directed by provider.     AASHISH Mendoza CNM

## 2022-06-09 ENCOUNTER — OFFICE VISIT (OUTPATIENT)
Dept: OBGYN | Facility: CLINIC | Age: 32
End: 2022-06-09
Attending: REGISTERED NURSE
Payer: COMMERCIAL

## 2022-06-09 VITALS
SYSTOLIC BLOOD PRESSURE: 135 MMHG | BODY MASS INDEX: 19.86 KG/M2 | DIASTOLIC BLOOD PRESSURE: 90 MMHG | HEART RATE: 97 BPM | WEIGHT: 123.6 LBS | HEIGHT: 66 IN

## 2022-06-09 DIAGNOSIS — Z00.00 VISIT FOR PREVENTIVE HEALTH EXAMINATION: Primary | ICD-10-CM

## 2022-06-09 DIAGNOSIS — Z13.29 SCREENING FOR THYROID DISORDER: ICD-10-CM

## 2022-06-09 DIAGNOSIS — Z30.430 ENCOUNTER FOR INSERTION OF INTRAUTERINE CONTRACEPTIVE DEVICE: ICD-10-CM

## 2022-06-09 DIAGNOSIS — Z97.5 IUD (INTRAUTERINE DEVICE) IN PLACE: ICD-10-CM

## 2022-06-09 DIAGNOSIS — R87.610 ASCUS WITH POSITIVE HIGH RISK HPV CERVICAL: ICD-10-CM

## 2022-06-09 DIAGNOSIS — Z11.3 SCREENING EXAMINATION FOR VENEREAL DISEASE: ICD-10-CM

## 2022-06-09 DIAGNOSIS — R03.0 ELEVATED BLOOD PRESSURE READING WITHOUT DIAGNOSIS OF HYPERTENSION: ICD-10-CM

## 2022-06-09 DIAGNOSIS — R87.810 ASCUS WITH POSITIVE HIGH RISK HPV CERVICAL: ICD-10-CM

## 2022-06-09 LAB
HCG UR QL: NEGATIVE
INTERNAL QC OK POCT: NORMAL
POCT KIT EXPIRATION DATE: NORMAL
POCT KIT LOT NUMBER: NORMAL

## 2022-06-09 PROCEDURE — 58300 INSERT INTRAUTERINE DEVICE: CPT | Performed by: REGISTERED NURSE

## 2022-06-09 PROCEDURE — G0463 HOSPITAL OUTPT CLINIC VISIT: HCPCS

## 2022-06-09 PROCEDURE — 99395 PREV VISIT EST AGE 18-39: CPT | Mod: 25 | Performed by: REGISTERED NURSE

## 2022-06-09 PROCEDURE — 87491 CHLMYD TRACH DNA AMP PROBE: CPT | Performed by: REGISTERED NURSE

## 2022-06-09 PROCEDURE — 87591 N.GONORRHOEAE DNA AMP PROB: CPT | Performed by: REGISTERED NURSE

## 2022-06-09 PROCEDURE — 250N000009 HC RX 250: Performed by: REGISTERED NURSE

## 2022-06-09 PROCEDURE — 81025 URINE PREGNANCY TEST: CPT | Performed by: REGISTERED NURSE

## 2022-06-09 RX ORDER — COPPER 313.4 MG/1
1 INTRAUTERINE DEVICE INTRAUTERINE ONCE
Status: COMPLETED
Start: 2022-06-09 | End: 2022-06-09

## 2022-06-09 RX ORDER — COPPER 313.4 MG/1
1 INTRAUTERINE DEVICE INTRAUTERINE ONCE
COMMUNITY
End: 2023-08-22

## 2022-06-09 RX ADMIN — COPPER 1 EACH: 313.4 INTRAUTERINE DEVICE INTRAUTERINE at 10:36

## 2022-06-09 NOTE — LETTER
"6/9/2022       RE: Marybel Lyon  [address]  [address]  [place] [address]     Dear Colleague,    Thank you for referring your patient, Marybel Lyon, to the [facility] [place] at [facility]. Please see a copy of my visit note below.    Subjective:  Marybel Lyon is a 32 year old female who presents today for her Annual Exam.    HPI:   - Wondering about a \"bartholin's gland cyst\" that was seen on her recent imaging for Chron's disease in 02/03/2021  - Wondering about fertility given her age, considering becoming pregnant in the next few years and wants to be sure that \"everything is ok\".   - Had contraceptive failure on 5/16/22 and took plan B that day, was using condoms which broke. Would like a ParaGard IUD placed today.     Menses:  Patient's last menstrual period was 05/08/2022 (exact date).   Periods are regular q 28-30 days, flow heavy on first 1-2 days then tapers.   Dysmenorrhea mild, occurring first 1-2 days of flow  Cyclic symptoms include  NONE  Additional symptoms include NONE    Contraception:  Current contraception: condoms  Number of partners in last year: 1, male partner.   Desires STD testing: Yes.     Mood: Felt very anxious when she took plan B last month, denies feelings of anxiety and depression otherwise.     Healthy lifestyle:  Abuse: No   Safety Concerns: No   Regular Exercise: Yes. Running, piliates 3-4x weekly.   Sunscreen Use: Yes  Healthy Diet: Yes    Past Screenings:  Health Maintenance   Topic Date Due     ADVANCE CARE PLANNING  Never done     PREVENTIVE CARE VISIT  06/09/2023     PAP  03/24/2026     DTAP/TDAP/TD IMMUNIZATION (7 - Td or Tdap) 09/22/2026     HEPATITIS C SCREENING  Completed     HIV SCREENING  Completed     PHQ-2 (once per calendar year)  Completed     INFLUENZA VACCINE  Completed     MENINGITIS IMMUNIZATION  Completed     HEPATITIS B IMMUNIZATION  Completed     " "COVID-19 Vaccine  Completed     Pneumococcal Vaccine: Pediatrics (0 to 5 Years) and At-Risk Patients (6 to 64 Years)  Aged Out     IPV IMMUNIZATION  Aged Out       PAP smear history:  Lab Results   Component Value Date    PAP NIL 2021      History of abnormal Pap smear: Yes    ASCUS, HPV: HR positive (2016)  Colposcopy 10/20/2016 normal.   Following this she had Pap smear in 2017 that was normal.   Lab Results   Component Value Date    PAP NIL 2021     Last mammogram:  Mammogram current: not applicable    Last Colonoscopy:  10/2021 - has had multiple d/t diagnosis of Chron's disease    Lipids:  No results found for: CHOL  No results found for: HDL  No results found for: LDL    Thyroid:  No results found for: TSH    Immunizations:  Immunization History   Administered Date(s) Administered     COVID-19,PF,Moderna 2021, 2021, 2021     DT (PEDS <7y) 2005     DTAP (<7y) 1996     HPV Quadrivalent 10/22/2006, 2006, 2007     HepB-Adult 1994, 1994, 1995     Hib, Unspecified 1991, 1991     Historical DTP/aP 1991, 1991, 1991, 1993     Influenza Vaccine IM > 6 months Valent IIV4 (Alfuria,Fluzone) 2016, 2020, 2022     MMR 1993, 1996     Meningococcal (Menactra ) 2007     Poliovirus, inactivated (IPV) 1991, 1991, 1993     Tdap (Adacel,Boostrix) 2016       Reviewed and updated history    Objective:  BP (!) 135/90   Pulse 97   Ht 1.674 m (5' 5.9\")   Wt 56.1 kg (123 lb 9.6 oz)   LMP 2022 (Exact Date)   BMI 20.01 kg/m       History:  Prescription Medications as of 2022       Rx Number Disp Refills Start End Last Dispensed Date Next Fill Date Owning Pharmacy    [employer] intrauterine copper device        [facility] [place], [place] - [address] [place] [address] [address]    Si each by Intrauterine route once    Class: Historical    " Route: Intrauterine    ustekinumab (STELARA) 90 MG/ML  1 mL 5 3/9/2022    [address]    Sig: Inject 1 ml ( 90 mg) subcutaneous every 6 weeks.    Class: E-Prescribe      Clinic-Administered Medications as of 2022       Dose Frequency Start End    paragard intrauterine copper IUD device 1 each (Completed) 1 Device ONCE 2022    Class: No Print Out    Route: Intrauterine        Allergies   Allergen Reactions     Other Environmental Allergy Itching and Visual Disturbance       OB History    Para Term  AB Living   0 0 0 0 0 0   SAB IAB Ectopic Multiple Live Births   0 0 0 0 0     Past Medical History:   Diagnosis Date     Abnormal Pap smear of cervix      Autoimmune disease (H)     Crohns     Crohn's disease (H)      Crohn's disease (H) 2021     Hypertension 2020    Unsure it previously caused by steroid meds     Uncomplicated asthma     Used inhaler in childhood, no issues as adult     Past Surgical History:   Procedure Laterality Date     COLONOSCOPY       NO HISTORY OF SURGERY       Family History   Problem Relation Age of Onset     Other - See Comments Mother         Hysterectomy     Hypertension Father      Cerebrovascular Disease Maternal Grandmother 50     Colon Cancer Maternal Grandfather         60s     Melanoma Maternal Grandfather      Diabetes Type 1 Paternal Grandmother      Other - See Comments Paternal Grandmother         uterine prolapse     Heart Disease Paternal Grandfather 50        Heart attack     Hypertension Cousin      Social History     Socioeconomic History     Marital status: Single   Tobacco Use     Smoking status: Never Smoker     Smokeless tobacco: Never Used   Substance and Sexual Activity     Alcohol use: Yes     Drug use: Never     Sexual activity: Not Currently     Partners: Male     Birth control/protection: Condom, None   Other Topics Concern     Parent/sibling w/ CABG, MI or angioplasty before 65F  "55M? No       ROS:  ROS: 10 point ROS neg other than the symptoms noted above in the HPI.   PHQ 3/24/2021   PHQ-9 Total Score 2   Q9: Thoughts of better off dead/self-harm past 2 weeks Not at all      ZAINA-7 SCORE 3/24/2021 6/6/2022   Total Score - 2 (minimal anxiety)   Total Score 2 2        Physical Exam:  Blood pressure (!) 135/90, pulse 97, height 1.674 m (5' 5.9\"), weight 56.1 kg (123 lb 9.6 oz), last menstrual period 05/08/2022, not currently breastfeeding. Body mass index is 20.01 kg/m .  General - pleasant female in no acute distress.  Skin - no suspicious lesions or rashes  EENT-  PERRLA, euthyroid with out palpable nodules  Neck - supple without lymphadenopathy.  Lungs - clear to auscultation bilaterally.  Heart - regular rate and rhythm without murmur.  Abdomen - soft, nontender, nondistended, no masses or organomegaly noted.  Musculoskeletal - no gross deformities.  Neurological - normal strength, sensation, and mental status.    Breast Exam:  Breast: Without visible skin changes. No dimpling or lesions seen.   Breasts supple, non-tender with palpation, no dominant mass, nodularity, or nipple discharge noted bilaterally. Axillary nodes negative.      Pelvic Exam:  EG/BUS: Normal genital architecture without lesions, erythema or abnormal secretions Bartholin's, Urethra, [place]'s normal   Urethral meatus: normal   Urethra: no masses, tenderness, or scarring   Bladder: no masses or tenderness   Vagina: moist, pink, rugae with physiologic discharge  secretions  Cervix: Nulliparous,, no lesions and pink, moist, closed, without lesion or CMT  Uterus: retroverted,  and small, smooth, firm, mobile w/o pain  Adnexa: Within normal limits and No masses, nodularity, tenderness  Rectum:anus normal       Assessment:  Encounter Diagnoses   Name Primary?     Encounter for insertion of intrauterine contraceptive device      Visit for preventive health examination Yes     Screening examination for venereal disease      " Screening for thyroid disorder      IUD (intrauterine device) in place      ASCUS with positive high risk HPV cervical      Elevated blood pressure reading without diagnosis of hypertension         Plan:  Orders Placed This Encounter   Procedures     INSERTION INTRAUTERINE DEVICE     TSH with free T4 reflex     hCG qual urine POCT      Orders Placed This Encounter   Medications     paragard intrauterine copper device     Si each by Intrauterine route once     paragard intrauterine copper IUD device 1 each       - Additional teaching done at this visit included: birth control and preconception  - Discussed with regular periods no reason to be concerned with fertility at this time, offered to draw TSH pt accepts. May use at home ovulation test kits if desired to gain mor information about cycles.   - Reviewed elevated BP today, was also abnormal at last annual exam. Recommend follow-up in 3-6 months with family medicine provider.   - Pap due .     RTC in one year for annual exam or sooner with concerns.    AASHISH Mendoza CNM    Answers for HPI/ROS submitted by the patient on 2022  ZAINA 7 TOTAL SCORE: 2  General Symptoms: No  Skin Symptoms: No  HENT Symptoms: No  EYE SYMPTOMS: No  HEART SYMPTOMS: No  LUNG SYMPTOMS: No  INTESTINAL SYMPTOMS: No  URINARY SYMPTOMS: No  GYNECOLOGIC SYMPTOMS: Yes  BREAST SYMPTOMS: No  SKELETAL SYMPTOMS: No  BLOOD SYMPTOMS: No  NERVOUS SYSTEM SYMPTOMS: No  MENTAL HEALTH SYMPTOMS: No  Bleeding or spotting between periods: No  Heavy or painful periods: No  Irregular periods: Yes  Vaginal discharge: Yes  Hot flashes: No  Vaginal dryness: No  Genital ulcers: No  Reduced libido: No  Painful intercourse: No  Difficulty with sexual arousal: No  Post-menopausal bleeding: No    IUD Insertion:  CONSULT:    Is a pregnancy test required: Yes.  Was it positive or negative?  Negative  Was a consent obtained?  Yes    Subjective: Marybel Lyon is a 32 year old  presents for IUD and  "desires Paragard type IUD.    Patient has been given the opportunity to ask questions about all forms of birth control, including all options appropriate for Marybel Lyon. Discussed that no method of birth control, except abstinence is 100% effective against pregnancy or sexually transmitted infection.     Marybel Lyon understands she may have the IUD removed at any time. IUD should be removed by a health care provider.    The entire insertion procedure was reviewed with the patient, including care after placement.    Patient's last menstrual period was 05/08/2022 (exact date). Last sexual activity: 4 days ago, used condoms. . No allergy to betadine or shellfish. Patient desires STD screening  HCG Qual Urine   Date Value Ref Range Status   06/09/2022 Negative Negative Final       BP (!) 135/90   Pulse 97   Ht 1.674 m (5' 5.9\")   Wt 56.1 kg (123 lb 9.6 oz)   LMP 05/08/2022 (Exact Date)   BMI 20.01 kg/m      Pelvic Exam:   EG/BUS: normal genital architecture without lesions, erythema or abnormal secretions.   Vagina: moist, pink, rugae with physiologic discharge and secretions  Cervix: nulliparous no lesions and pink, moist, closed, without lesion or CMT  Uterus: retroverted position, mobile, no pain  Adnexa: within normal limits and no masses, nodularity, tenderness    PROCEDURE NOTE: -- IUD Insertion    Reason for Insertion: contraception    Premedicated with tylenol  Under sterile technique, cervix was visualized with speculum and prepped with Betadine solution swab x 3. Tenaculum was placed for stability. The uterus was gently straightened and sounded to 6.0 cm. IUD prepared for placement, and IUD inserted according to 's instructions without difficulty or significant resitance, and deployed at the fundus. The strings were visualized and trimmed to 2.0 cm from the external os. Tenaculum was removed and hemostasis noted. Speculum removed.  Patient tolerated procedure well.    Lot # " [id number]  Exp: 01/2028    EBL: minimal    Complications: none    ASSESSMENT:     ICD-10-CM    1. Visit for preventive health examination  Z00.00 Neisseria gonorrhoeae PCR Swab [[id number]]     Chlamydia trachomatis PCR Swab     TSH with free T4 reflex   2. Encounter for insertion of intrauterine contraceptive device  Z30.430 paragard intrauterine copper device     paragard intrauterine copper IUD device 1 each     INSERTION INTRAUTERINE DEVICE     hCG qual urine POCT   3. Screening examination for venereal disease  Z11.3 Neisseria gonorrhoeae PCR Swab [[id number]]     Chlamydia trachomatis PCR Swab   4. Screening for thyroid disorder  Z13.29 TSH with free T4 reflex   5. IUD (intrauterine device) in place  Z97.5    6. ASCUS with positive high risk HPV cervical  R87.610     R87.810    7. Elevated blood pressure reading without diagnosis of hypertension  R03.0         PLAN:    Given 's handouts, including when to have IUD removed, list of danger s/sx, side effects and follow up recommended. Encouraged condom use for prevention of STD. Back up contraception advised for 7 days if progestin method. Advised to call for any fever, for prolonged or severe pain or bleeding, abnormal vaginal discharge, or unable to palpate strings. She was advised to use pain medications (ibuprofen) as needed for mild to moderate pain. Advised to follow-up in clinic in 4-6 weeks for IUD string check if unable to find strings or as directed by provider.     AASHISH Mendoza CNM          Again, thank you for allowing me to participate in the care of your patient.      Sincerely,    AASHISH Mendoza CNM

## 2022-06-09 NOTE — LETTER
Date:Sarah 15, 2022      Patient was self referred, no letter generated. Do not send.        [facility] Health Information

## 2022-06-09 NOTE — PATIENT INSTRUCTIONS
IUD information:     Benefits: The IUD can be 97-99% effective when carefully following directions regarding use. It can be more effective if used with additional contraception. IUD containing progestin may decrease menstrual flow and menstrual cramping.     Risks/Side Effects: include but are not limited to spotting, bleeding, hemorrhage, or anemia: cramping or pain: partial or complete expulsion of device; lost IUD strings; uterine or cervical perforation; embedding of IUD in the uterine wall; increased risk of pelvic inflammatory disease. Women who become pregnant with an IUD in place are at a higher risk for ectopic pregnancy should a pregnancy occur with an IUD in situ. There is a higher rate of miscarriage when pregnancy occurs with IUD in place.    Warning signs: Please call clinic if you have abnormal spotting or heavy bleeding, abdominal pain, dyspareunia, fever, chills, flu like symptoms, or unable to locate strings of IUD, or strings are longer or shorter than expected.    You are encouraged to use condoms for prevention of STD. You may also need back up contraception for 7 days with your IUD. You may use pain medications (ibuprofen) as needed for mild to moderate pain. Please follow-up in clinic in 4-6 weeks for IUD string check if unable to find strings or as directed by provider.    PREVENTIVE HEALTH RECOMMENDATIONS:   Most women need a yearly breast and pelvic exam.    A PAP screen, a test done DURING a pelvic exam, is NO longer recommended yearly.    March 2013, screening guidelines recommended by ACOG for PAP screen are:    1) First pap at age 21.    2) Pap every 3 years until age 30.    3) After age 30, pap every 3 years or Pap with HR HPV screen every 5 years until age 65.  4) Women do NOT need a vaginal Pap screen after a hysterectomy (surgical removal of the uterus) when they have not had cancer.    Exceptions:  1) Yearly pap if HIV+ or immunosuppressed secondary to organ transplant  2) JUDY  II-III continue routine screening for 20 years.    I encourage you continue looking for opportunities to choose a healthy lifestyle:       * Choose to eat a heart healthy diet. Check out the FOOD PLATE guidelines at: http://www.choosemyplate.gov/ for helpful hints on weight and cholesterol management.  Balance your caloric intake with exercise to maintain a BMI in the 22 to 26 range. For bone health: Eat calcium-rich foods like yogurt, broccoli or take chewable calcium pills (500 to 600 mg) twice a day with food.       * Exercise for at least an average of 30 minutes a day, 5 days of the week. This will help you control your weight, release stress, and help prevent disease.      * Take a Vitamin D3 supplement daily fall through spring and during summer unless you [id number]' full body sun exposure to skin without sunscreen.      * DO wear sunscreen to prevent skin cancer after the first 15-30 minutes.      * Identify stressors in your life, find ways to release the stress, and, make time for yourself. PLEASE ask for help if mood changes last longer than two weeks.     * Limit alcohol to one drink per day.  No smoking.  Avoid second hand smoke. If you smoke, ask for help to stop.       *  If you are in a sexual relationship, talk with your partner about possible infection risks and take action to protect yourself from exposure to a sexual infection.    Please request an infection screen for STIs (sexually transmitted infections) if you are less than age 26 OR believe that you may be at risk.     Get a flu shot each year. Get a tetanus shot every 10 years. EVERYONE needs a pertussis (Whooping cough) booster.    See your dentist twice a year for an exam and preventive care cleaning.     Consider the following screen tests:    1) cholesterol test every 5 years.     2) yearly mammogram after age 40 unless you have identified risks.    3) colonoscopy every 10 years after age 50 unless you have identified risks.    4)  diabetes blood test screening if you are at risk for diabetes.      Additional information that you may also find helpful:  The Internet now gives us access to LOTS of information -- some of it helpful, research documented and also plenty of harmful, anecdotal information that may not pertain to your situtaion. Consider visiting the following websites for accurate health information:    www.vitamindcouncil.org/ : Info and ongoing research re Vitamin D    www.fairview.org : Up to date and easily searchable information on multiple topics.    www.medlineplus.gov : medication info, interactive tutorials, watch real surgeries online    www.cdc.gov : public health info, travel advisories, epidemics (H1N1)    www.zac/std.org: current research re diagnosis, treatment and prevention of sexually contacted infections.    www.health.[facility].[place].us : [place] dept of heatl, public health issues in [place], N1N1    www.familydoctor.org : good info from the Academy of Family Physicians

## 2022-06-10 LAB
C TRACH DNA SPEC QL NAA+PROBE: NEGATIVE
N GONORRHOEA DNA SPEC QL NAA+PROBE: NEGATIVE

## 2022-06-13 ENCOUNTER — TELEPHONE (OUTPATIENT)
Dept: GASTROENTEROLOGY | Facility: CLINIC | Age: 32
End: 2022-06-13
Payer: COMMERCIAL

## 2022-06-13 NOTE — TELEPHONE ENCOUNTER
[facility] Call Center    Phone Message    May a detailed message be left on voicemail: yes     Reason for Call: Medication Question or concern regarding medication   Prescription Clarification  Name of Medication: ustekinumab (STELARA) 90 MG/ML  Prescribing Provider: Marcela Cerda [facility]   Pharmacy: [address]     What on the order needs clarification?   Pt called to request an EDUIN be sent to their pharmacy so they can process their Stellara.          Action Taken: Message routed to:  [facility] ([facility] Gastro    Travel Screening: Not Applicable

## 2022-06-14 ENCOUNTER — PATIENT OUTREACH (OUTPATIENT)
Dept: GASTROENTEROLOGY | Facility: CLINIC | Age: 32
End: 2022-06-14
Payer: COMMERCIAL

## 2022-06-14 NOTE — TELEPHONE ENCOUNTER
Left a message for patient to call back   Requesting a call back to discuss stelara refill. .    Patient has refills and has current prior authorization

## 2022-07-17 ENCOUNTER — APPOINTMENT (OUTPATIENT)
Dept: ULTRASOUND IMAGING | Facility: CLINIC | Age: 32
End: 2022-07-17
Attending: EMERGENCY MEDICINE
Payer: COMMERCIAL

## 2022-07-17 ENCOUNTER — HOSPITAL ENCOUNTER (EMERGENCY)
Facility: CLINIC | Age: 32
Discharge: HOME | End: 2022-07-17
Attending: EMERGENCY MEDICINE | Admitting: EMERGENCY MEDICINE
Payer: COMMERCIAL

## 2022-07-17 VITALS
RESPIRATION RATE: 17 BRPM | DIASTOLIC BLOOD PRESSURE: 102 MMHG | BODY MASS INDEX: 20.49 KG/M2 | WEIGHT: 120 LBS | SYSTOLIC BLOOD PRESSURE: 135 MMHG | HEIGHT: 64 IN | OXYGEN SATURATION: 96 % | TEMPERATURE: 98 F | HEART RATE: 90 BPM

## 2022-07-17 DIAGNOSIS — N93.9 VAGINAL BLEEDING: ICD-10-CM

## 2022-07-17 DIAGNOSIS — R10.2 PELVIC PAIN IN FEMALE: Primary | ICD-10-CM

## 2022-07-17 LAB
ALBUMIN UR-MCNC: NEGATIVE MG/DL
APPEARANCE UR: CLEAR
BILIRUB UR QL STRIP: NEGATIVE
CLUE CELLS: NORMAL
COLOR UR AUTO: ABNORMAL
GLUCOSE UR STRIP-MCNC: NEGATIVE MG/DL
HCG SERPL QL: NEGATIVE
HCG UR QL: NEGATIVE
HGB UR QL STRIP: ABNORMAL
HOLD SPECIMEN: NORMAL
KETONES UR STRIP-MCNC: NEGATIVE MG/DL
LEUKOCYTE ESTERASE UR QL STRIP: ABNORMAL
MUCOUS THREADS #/AREA URNS LPF: PRESENT /LPF
NITRATE UR QL: NEGATIVE
PH UR STRIP: 5.5 [PH] (ref 5–7)
RBC URINE: 169 /HPF
SP GR UR STRIP: 1.01 (ref 1–1.03)
SQUAMOUS EPITHELIAL: 1 /HPF
T VAGINALIS DNA SPEC QL NAA+PROBE: NOT DETECTED
TRICHOMONAS, WET PREP: NORMAL
UROBILINOGEN UR STRIP-MCNC: NORMAL MG/DL
WBC URINE: 1 /HPF
WBC'S/HIGH POWER FIELD, WET PREP: NORMAL
YEAST, WET PREP: NORMAL

## 2022-07-17 PROCEDURE — 76830 TRANSVAGINAL US NON-OB: CPT | Mod: 26 | Performed by: RADIOLOGY

## 2022-07-17 PROCEDURE — 76856 US EXAM PELVIC COMPLETE: CPT | Mod: 26 | Performed by: RADIOLOGY

## 2022-07-17 PROCEDURE — 87491 CHLMYD TRACH DNA AMP PROBE: CPT | Performed by: EMERGENCY MEDICINE

## 2022-07-17 PROCEDURE — 87591 N.GONORRHOEAE DNA AMP PROB: CPT | Performed by: EMERGENCY MEDICINE

## 2022-07-17 PROCEDURE — 87661 TRICHOMONAS VAGINALIS AMPLIF: CPT | Performed by: EMERGENCY MEDICINE

## 2022-07-17 PROCEDURE — 76705 ECHO EXAM OF ABDOMEN: CPT | Mod: 26 | Performed by: EMERGENCY MEDICINE

## 2022-07-17 PROCEDURE — 81025 URINE PREGNANCY TEST: CPT | Performed by: EMERGENCY MEDICINE

## 2022-07-17 PROCEDURE — 99285 EMERGENCY DEPT VISIT HI MDM: CPT | Mod: 25 | Performed by: EMERGENCY MEDICINE

## 2022-07-17 PROCEDURE — 76705 ECHO EXAM OF ABDOMEN: CPT | Performed by: EMERGENCY MEDICINE

## 2022-07-17 PROCEDURE — 250N000011 HC RX IP 250 OP 636: Performed by: EMERGENCY MEDICINE

## 2022-07-17 PROCEDURE — 81001 URINALYSIS AUTO W/SCOPE: CPT | Performed by: EMERGENCY MEDICINE

## 2022-07-17 PROCEDURE — 76856 US EXAM PELVIC COMPLETE: CPT

## 2022-07-17 PROCEDURE — 87210 SMEAR WET MOUNT SALINE/INK: CPT | Mod: 59 | Performed by: EMERGENCY MEDICINE

## 2022-07-17 PROCEDURE — 96374 THER/PROPH/DIAG INJ IV PUSH: CPT | Mod: 59 | Performed by: EMERGENCY MEDICINE

## 2022-07-17 PROCEDURE — 36415 COLL VENOUS BLD VENIPUNCTURE: CPT | Performed by: EMERGENCY MEDICINE

## 2022-07-17 PROCEDURE — 84703 CHORIONIC GONADOTROPIN ASSAY: CPT | Performed by: EMERGENCY MEDICINE

## 2022-07-17 RX ORDER — OXYCODONE HYDROCHLORIDE 5 MG/1
5 TABLET ORAL EVERY 6 HOURS PRN
Qty: 9 TABLET | Refills: 0 | Status: SHIPPED | OUTPATIENT
Start: 2022-07-17 | End: 2022-07-20

## 2022-07-17 RX ADMIN — HYDROMORPHONE HYDROCHLORIDE 1 MG: 1 INJECTION, SOLUTION INTRAMUSCULAR; INTRAVENOUS; SUBCUTANEOUS at 05:30

## 2022-07-17 NOTE — ED TRIAGE NOTES
"Patient arrives ambulatory to triage from home with boyfriend.   Patient states she is on day two of her period and has horrific cramps. She had a IUD placed a month and a half ago. She states she here last period with it in was not as bad. She states there has been additional bleeding, \"a stream of blood as if it was pee\" The cramping is bad in her lower mid abdomen and in her back.  Tylenol and heating pad has not been helpful, she has been unable to sleep.   Patient states she has been nauseous but no dizziness.     "

## 2022-07-17 NOTE — DISCHARGE INSTRUCTIONS
As we discussed, the preliminary read of the ultrasound showed:   1. IUD positioned within the uterine cavity, appears partially within  the lower uterine segment. Correlate with pelvic exam if there is  continued clinical concern for IUD displacement.   2. Free fluid in the cul-de-sac and about the right adnexa.  3. No convincing evidence of ovarian torsion. Symmetric ovarian size.   4. Right ovarian morphology with multiple small cysts, may support a  diagnosis of polycystic ovarian syndrome in the appropriate clinical  context.

## 2022-07-18 LAB
C TRACH DNA SPEC QL NAA+PROBE: NEGATIVE
N GONORRHOEA DNA SPEC QL NAA+PROBE: NEGATIVE

## 2022-07-18 NOTE — RESULT ENCOUNTER NOTE
Final result for both N. Gonorrhoeae PCR and Chlamydia Trachomatis PCR are NEGATIVE.  No treatment or change in treatment per [facility] ED Lab Result N. Gonorrhea AND/OR Chlamydia T. protocol.

## 2022-07-18 NOTE — ED PROVIDER NOTES
ED Provider Note  [facility]      History     Chief Complaint   Patient presents with     Abdominal Pain     HPI  Marybel Lyon is a 32 year old female hx of Crohn's disease presenting with vaginal bleeding and pelvic cramping with radiation of pain to her back    Patient is on day 2 of her menses.  Experienced severe cramps out of character for her usual menstrual period symptoms.  Vaginal bleeding has been intermittent and several times felt like a gush of blood, but is not soaking many pads.  6 weeks ago had IUD placed, had no complications or concerning symptoms until this visit.    Crohn's well-controlled on immunomodulators  No fevers or chills chest pain or shortness of breath.    Tried Tylenol and warm packs without relief  No lightheadedness or dizziness.  No syncopal episodes.  Sexually active.         Past Medical History  Past Medical History:   Diagnosis Date     Abnormal Pap smear of cervix 2016     Autoimmune disease (H) 2012    Crohns     Crohn's disease (H)      Crohn's disease (H) 04/08/2021     Hypertension 12/2020    Unsure it previously caused by steroid meds     Uncomplicated asthma     Used inhaler in childhood, no issues as adult     Past Surgical History:   Procedure Laterality Date     COLONOSCOPY       NO HISTORY OF SURGERY       oxyCODONE (ROXICODONE) 5 MG tablet  paragard intrauterine copper device  ustekinumab (STELARA) 90 MG/ML      Allergies   Allergen Reactions     Other Environmental Allergy Itching and Visual Disturbance     Family History  Family History   Problem Relation Age of Onset     Other - See Comments Mother         Hysterectomy     Hypertension Father      Cerebrovascular Disease Maternal Grandmother 50     Colon Cancer Maternal Grandfather         60s     Melanoma Maternal Grandfather      Diabetes Type 1 Paternal Grandmother      Other - See Comments Paternal Grandmother         uterine prolapse     Heart Disease Paternal Grandfather 50  "       Heart attack     Hypertension Cousin      Social History   Social History     Tobacco Use     Smoking status: Never Smoker     Smokeless tobacco: Never Used   Substance Use Topics     Alcohol use: Yes     Drug use: Never      Past medical history, past surgical history, medications, allergies, family history, and social history were reviewed with the patient. No additional pertinent items.       Review of Systems  A complete review of systems was performed with pertinent positives and negatives noted in the HPI, and all other systems negative.    Physical Exam   BP: (!) 158/116  Pulse: 98  Temp: 97.6  F (36.4  C)  Resp: 18  Height: 162.6 cm (5' 4\")  Weight: 54.4 kg (120 lb)  SpO2: 98 %  Physical Exam  GEN: Uncomfortable appearing but, non toxic, cooperative and conversant.   HEENT: The head is normocephalic and atraumatic. Pupils are equal round and reactive to light. Extraocular motions are intact. There is no facial swelling. The neck is nontender and supple.   CV: Regular rate and rhythm. 2+ radial pulses bilaterally.  PULM: Unlabored breathing  ABD: Soft, suprapubic tenderness to palpation, nondistended.   EXT: Full range of motion.  No edema.  NEURO: Cranial nerves II through XII are intact and symmetric. Bilateral upper and lower extremities grossly show full range of motion without any focal deficits.   Pelvic exam:  Normal external female genitalia. Normal vaginal mucosa with moderate blood in the vaginal vault no active bleeding no discharge or drainage.  The cervical Os is closed.  No CMT no adnexal TTP.   No Masses noted.  IUD is not visualized    PSYCH: Calm and cooperative, interactive.     ED Course      Procedures  Results for orders placed during the hospital encounter of 07/17/22    POC US ABDOMEN LIMITED    Impression  Bedside limited transabdominal ultrasound for evaluation of IUD position  Performed any interpreted by me.  Indication: vaginal bleeding    The lower abdomen was interrogated " with a curvilinear probe. The uterus was identified. Within the uterus there is an IUD, appearing in the lower uterus just above cervix. No gross perforation. Scant cul-de-sac free fluid. Fibroid in uterus body.    IImpression: Possible IUD displacement                   Results for orders placed or performed during the hospital encounter of 07/17/22   POC US ABDOMEN LIMITED     Status: None    Impression    Bedside limited transabdominal ultrasound for evaluation of IUD position  Performed any interpreted by me.  Indication: vaginal bleeding     The lower abdomen was interrogated with a curvilinear probe. The uterus was identified. Within the uterus there is an IUD, appearing in the lower uterus just above cervix. No gross perforation. Scant cul-de-sac free fluid. Fibroid in uterus body.     IImpression: Possible IUD displacement     US Pelvic Complete with Transvaginal     Status: None    Narrative    EXAMINATION: US PELVIC TRANSABDOMINAL AND TRANSVAGINAL, 7/17/2022 7:23  AM     COMPARISON: Pelvic ultrasound 3/26/2021, POC ultrasound 7/17/2022    HISTORY: abdominal pain, vaginal bleeding and suspected IUD  displacement- eval IUD position    TECHNIQUE: The pelvis was scanned in standard fashion with  transabdominal and transvaginal transducer(s) using both grey scale  and color Doppler techniques.    FINDINGS  Uterus: Retroverted uterus. Heterogeneously echogenic fibroid in the  uterine body measuring 6.3 x 4.4 x 5.5 cm, previously 4.6 cm on prior  MRI. Difference likely related to different modalities. Uterus  measures 6.9 x 4.7 x 5.2 cm. IUD positioned within the uterine cavity,  partially within the lower segment.    Right ovary: 2.6 x 2.2 x 2.8 cm. Volume 8.3 cc. Normal echogenicity.  Normal color Doppler flow and arterial waveforms.  Small echogenic  structure adjacent to the ovary favored to represent the fallopian  tube.    Left ovary: 3.4 x 1.5 x 1.9 cm. Volume 5.0 cc. Approximately 20 small  round  follicles, many peripheral. Normal echogenicity. Normal color  Doppler flow and arterial waveforms. Echogenic area adjacent to the  left ovary likely tube/adnexal structures.     Free fluid: Small amount of free fluid in the cul-de-sac and at the  right adnexa.      Impression    IMPRESSION:     1. IUD positioned within the uterine cavity, appears partially within  the lower uterine segment. This may be in part due to the large  fibroid. Correlate with pelvic exam if there is continued clinical  concern for IUD displacement.   2. Free fluid in the cul-de-sac and about the right adnexa.  3. No convincing evidence of ovarian torsion. Symmetric ovarian size.   4. Ovarian morphology with multiple small follicles, most likely  normal morphology for patient's age given ovaries are not enlarged  however could be correlated with any symptoms for polycystic ovarian  syndrome in the appropriate clinical context.    I have personally reviewed the examination and initial interpretation  and I agree with the findings.    NILAY MIRELES MD         SYSTEM ID:  [id number]   [place] [facility]     Status: None    Narrative    The following orders were created for panel order [place] [facility].  Procedure                               Abnormality         Status                     ---------                               -----------         ------                     Extra Blue Top Tube[[id number]]                              Final result               Extra Red Top Tube[[id number]]                               Final result               Extra Green Top (Lithium...[[id number]]                      Final result               Extra Purple Top Tube[[id number]]                            Final result                 Please view results for these tests on the individual orders.   Extra Blue Top Tube     Status: None   Result Value Ref Range    Hold Specimen JIC    Extra Red Top Tube     Status: None   Result Value Ref Range    Hold Specimen JIC     Extra Green Top (Lithium Heparin) Tube     Status: None   Result Value Ref Range    Hold Specimen JIC    Extra Purple Top Tube     Status: None   Result Value Ref Range    Hold Specimen JIC    UA with Microscopic reflex to Culture     Status: Abnormal    Specimen: Urine, Clean Catch   Result Value Ref Range    Color Urine Straw Colorless, Straw, Light Yellow, Yellow    Appearance Urine Clear Clear    Glucose Urine Negative Negative mg/dL    Bilirubin Urine Negative Negative    Ketones Urine Negative Negative mg/dL    Specific Gravity Urine 1.008 1.003 - 1.035    Blood Urine Large (A) Negative    pH Urine 5.5 5.0 - 7.0    Protein Albumin Urine Negative Negative mg/dL    Urobilinogen Urine Normal Normal, 2.0 mg/dL    Nitrite Urine Negative Negative    Leukocyte Esterase Urine Trace (A) Negative    Mucus Urine Present (A) None Seen /LPF    RBC Urine 169 (H) <=2 /HPF    WBC Urine 1 <=5 /HPF    Squamous Epithelials Urine 1 <=1 /HPF    Narrative    Urine Culture not indicated   HCG qualitative urine (UPT)     Status: Normal   Result Value Ref Range    hCG Urine Qualitative Negative Negative   HCG qualitative Blood     Status: Normal   Result Value Ref Range    hCG Serum Qualitative Negative Negative   Wet preparation     Status: Normal    Specimen: Vagina; Swab   Result Value Ref Range    Trichomonas Absent Absent    Yeast Absent Absent    Clue Cells Absent Absent    WBCs/high power field None None   Trichomonas vaginalis by PCR     Status: Normal    Specimen: Vagina; Urine   Result Value Ref Range    Trichomonas vaginalis by PCR Not Detected Not Detected    Narrative    The [employer] Xpert TV Assay, performed on the [employer], is a qualitative in vitro diagnostic test for the detection of Trichomonas vaginalis genomic DNA. The test utilizes automated real-time polymerase chain reaction (PCR). The Xpert TV Assay uses female and male urine specimens, endocervical swab specimens, or patient-collected  vaginal swab specimens (collected in a clinical setting). The Xpert TV Assay is intended to aid in the diagnosis of trichomoniasis in symptomatic or asymptomatic individuals.     Medications - No data to display     Assessments & Plan (with Medical Decision Making)   32-year-old female with history as noted presenting with pelvic pain and vaginal bleeding.  Historical context of IUD placement 6 weeks ago.  LMP 2 days ago    DDx is broad including pregnancy, ectopic pregnancy, IUD displacement, endometritis, PID, BV, UTI, pyelonephritis, ovarian cyst with or without rupture, colitis, Crohn's flare, among other causes    Clinically the patient appears very uncomfortable.   -Dilaudid 1 mg IV x1 with significant improvement    Pelvic exam overall unrevealing bedside ultrasound with question of IUD displacement?  -Wet prep unrevealing  -GC chlamydia sent  -Urinalysis consistent with menses.     Patient signed out to the oncoming provider with plan:  -Follow-up formal transvaginal ultrasound to better characterize IUD position due to concern for displacement.  Gyn consult PRN pending results and pt re-evaluation.         I have reviewed the nursing notes. I have reviewed the findings, diagnosis, plan and need for follow up with the patient.    Discharge Medication List as of 7/17/2022  8:29 AM      START taking these medications    Details   oxyCODONE (ROXICODONE) 5 MG tablet Take 1 tablet (5 mg) by mouth every 6 hours as needed for pain, Disp-9 tablet, R-0, Local Print             Final diagnoses:   Pelvic pain in female   Vaginal bleeding       --  Hugo Michele MD    [facility] EMERGENCY DEPARTMENT  7/17/2022     Hugo Michele MD  07/18/22 0112

## 2022-07-20 ENCOUNTER — TELEPHONE (OUTPATIENT)
Dept: GASTROENTEROLOGY | Facility: CLINIC | Age: 32
End: 2022-07-20

## 2022-07-20 NOTE — TELEPHONE ENCOUNTER
Prior Authorization Approval    Authorization Effective Date: 7/20/2022  Authorization Expiration Date: 7/20/2023  Medication: Stelara renewal approved  Approved Dose/Quantity: [id number]  Reference #: [id number]   Insurance Company: [employer] - Phone [phone] Fax [phone]  Expected CoPay:       CoPay Card Available:      Foundation Assistance Needed:    Which Pharmacy is filling the prescription (Not needed for infusion/clinic administered): [address]  Pharmacy Notified: Yes  Patient Notified: Yes   *NOTE WILL NEED TO SEND A DIFFERENT PA FOR QUANTITY LIMIT ENDS 10/15/2022

## 2022-07-21 ENCOUNTER — VIRTUAL VISIT (OUTPATIENT)
Dept: FAMILY MEDICINE | Facility: CLINIC | Age: 32
End: 2022-07-21
Payer: COMMERCIAL

## 2022-07-21 DIAGNOSIS — R10.2 PELVIC PAIN IN FEMALE: Primary | ICD-10-CM

## 2022-07-21 PROCEDURE — 99212 OFFICE O/P EST SF 10 MIN: CPT | Mod: 95 | Performed by: NURSE PRACTITIONER

## 2022-07-21 NOTE — PROGRESS NOTES
Marybel is a 32 year old who is being evaluated via a billable video visit.      How would you like to obtain your AVS? MyChart  If the video visit is dropped, the invitation should be resent by: Text to cell phone: [phone]  Will anyone else be joining your video visit? No        Assessment & Plan     Pelvic pain in female  Symptoms improved.  We discussed that ovarian cysts can recur and that some of her symptoms may have been secondary to fibroids.  They may improve over time with the IUD, if not, would follow up with OBGYN.  Periods are regular, we discussed treating 1-2 days prior to onset with NSAID to help symptoms.        Return for Follow up if symptoms worsen or fail to improve.    AASHISH Dickey CNP  [facility]   Marybel is a 32 year old, presenting for the following health issues:  No chief complaint on file.      HPI     ED/UC Followup:    Facility: [facility] Emergency Department   Date of visit: 7/17/2022  Reason for visit: Abdominal Pain, vaginal bleeding   Current Status: Improved    Improvement in symptoms since ED visit    In high school had a cyst.    Wondering if she needs to be concerned about infection, the fibroid, recurrence of cyst.      Review of Systems   Constitutional, HEENT, cardiovascular, pulmonary, gi and gu systems are negative, except as otherwise noted.      Objective           Vitals:  No vitals were obtained today due to virtual visit.    Physical Exam   GENERAL: Healthy, alert and no distress  EYES: Eyes grossly normal to inspection.  No discharge or erythema, or obvious scleral/conjunctival abnormalities.  RESP: No audible wheeze, cough, or visible cyanosis.  No visible retractions or increased work of breathing.    SKIN: Visible skin clear. No significant rash, abnormal pigmentation or lesions.  NEURO: Cranial nerves grossly intact.  Mentation and speech appropriate for age.  PSYCH: Mentation appears normal,  affect normal/bright, judgement and insight intact, normal speech and appearance well-groomed.                Video-Visit Details    Video Start Time: 15:38    Type of service:  Video Visit    Video End Time:15:53    Originating Location (pt. Location): Home    Distant Location (provider location):  [facility]     Platform used for Video Visit: Akanoo    .  ..

## 2022-08-22 ENCOUNTER — OFFICE VISIT (OUTPATIENT)
Dept: DERMATOLOGY | Facility: CLINIC | Age: 32
End: 2022-08-22
Payer: COMMERCIAL

## 2022-08-22 DIAGNOSIS — Z12.83 SKIN CANCER SCREENING: ICD-10-CM

## 2022-08-22 DIAGNOSIS — D18.01 CHERRY ANGIOMA: ICD-10-CM

## 2022-08-22 DIAGNOSIS — L81.4 SOLAR LENTIGO: ICD-10-CM

## 2022-08-22 DIAGNOSIS — D22.9 MULTIPLE BENIGN NEVI: Primary | ICD-10-CM

## 2022-08-22 PROCEDURE — 99213 OFFICE O/P EST LOW 20 MIN: CPT | Performed by: PHYSICIAN ASSISTANT

## 2022-08-22 ASSESSMENT — PAIN SCALES - GENERAL: PAINLEVEL: NO PAIN (0)

## 2022-08-22 NOTE — PATIENT INSTRUCTIONS
Patient Education     Checking for Skin Cancer  You can find cancer early by checking your skin each month. There are 3 kinds of skin cancer. They are melanoma, basal cell carcinoma, and squamous cell carcinoma. Doing monthly skin checks is the best way to find new marks or skin changes. Follow the instructions below for checking your skin.   The ABCDEs of checking moles for melanoma   Check your moles or growths for signs of melanoma using ABCDE:   Asymmetry: the sides of the mole or growth don t match  Border: the edges are ragged, notched, or blurred  Color: the color within the mole or growth varies  Diameter: the mole or growth is larger than 6 mm (size of a pencil eraser)  Evolving: the size, shape, or color of the mole or growth is changing (evolving is not shown in the images below)    Checking for other types of skin cancer  Basal cell carcinoma or squamous cell carcinoma have symptoms such as:     A spot or mole that looks different from all other marks on your skin  Changes in how an area feels, such as itching, tenderness, or pain  Changes in the skin's surface, such as oozing, bleeding, or scaliness  A sore that does not heal  New swelling or redness beyond the border of a mole    Who s at risk?  Anyone can get skin cancer. But you are at greater risk if you have:   Fair skin, light-colored hair, or light-colored eyes  Many moles or abnormal moles on your skin  A history of sunburns from sunlight or tanning beds  A family history of skin cancer  A history of exposure to radiation or chemicals  A weakened immune system  If you have had skin cancer in the past, you are at risk for recurring skin cancer.   How to check your skin  Do your monthly skin checkups in front of a full-length mirror. Check all parts of your body, including your:   Head (ears, face, neck, and scalp)  Torso (front, back, and sides)  Arms (tops, undersides, upper, and lower armpits)  Hands (palms, backs, and fingers, including  under the nails)  Buttocks and genitals  Legs (front, back, and sides)  Feet (tops, soles, toes, including under the nails, and between toes)  If you have a lot of moles, take digital photos of them each month. Make sure to take photos both up close and from a distance. These can help you see if any moles change over time.   Most skin changes are not cancer. But if you see any changes in your skin, call your doctor right away. Only he or she can diagnose a problem. If you have skin cancer, seeing your doctor can be the first step toward getting the treatment that could save your life.   [employer] last reviewed this educational content on 4/1/2019 2000-2020 [employer]. [address] [place] [address], [place] [address]. All rights reserved. This information is not intended as a substitute for professional medical care. Always follow your healthcare professional's instructions.       When should I call my doctor?  If you are worsening or not improving, please, contact us or seek urgent care as noted below.     Who should I call with questions (adults)?  [facility] (adult and pediatric): [phone]  [facility] (adult): [phone]  For urgent needs outside of business hours call the [facility] at [phone] and ask for the dermatology resident on call to be paged  If this is a medical emergency and you are unable to reach an ER, Call [phone]    Who should I call with questions (pediatric)?  [facility]- Pediatric Dermatology  Dr. Yesenia Paulson, Dr. Dashawn Tinsley, Dr. Dian Barajas, CHAI Spence, Dr. Bijal Finney, Dr. Malena Carson & Dr. James Jamil  Non-urgent nurse triage line; [phone]- Carlita and Elizabeth MONTES Care Coordinatoromer Kwon (/Complex ) [phone]    If you need a prescription refill, please contact your pharmacy. Refills are approved or denied by our  Physicians during normal business hours, Monday through Fridays  Per office policy, refills will not be granted if you have not been seen within the past year (or sooner depending on your child's condition)    Scheduling Information:  Pediatric Appointment Scheduling and Call Center [phone]  Radiology Scheduling- [phone]  Sedation Unit Scheduling- [phone]  [place] Scheduling- General [phone]; Pediatric Dermatology [phone]  Main  Services: [phone]  [place]: [phone]  Fijian: [phone]  Hmong/Slovak/Upper sorbian: [phone]  Preadmission Nursing Department Fax Number: [phone] (Fax all pre-operative paperwork to this number)    For urgent matters arising during evenings, weekends, or holidays that cannot wait for normal business hours please call [phone] and ask for the dermatology resident on call to be paged.

## 2022-08-22 NOTE — LETTER
8/22/2022       RE: Marybel Lyon  [address] [place] [address]     Dear Colleague,    Thank you for referring your patient, Marybel Lyon, to the [facility] DERMATOLOGY [facility]INIC [place] at [facility]. Please see a copy of my visit note below.    [facility] Dermatology Note  Encounter Date: Aug 22, 2022  Office Visit     Dermatology Problem List:  1. Nevi to monitor  - Left flank, R plantar foot, L plantar foot - measurements and photos obtained 3/16/21 and stable on 9/20/21, 8/22/2022  - used to follow at [facility] in [facility], records requested 3/16/21  2. Family hx of melanoma in grandfather     ____________________________________________    Assessment & Plan:    # Nevi to monitor  Left flank - photodocumentation in chart. Stable.  R plantar foot -  photodocumentation in chart. Stable.  L plantar foot -  photodocumentation in chart. Stable.     # Cherry angiomas  - Reassured of benign nature, no treatment necessary    # Multiple benign nevi  # Solar lentigines  - No concerning lesions today  - Monitor for ABCDEs of melanoma   - Continue sun protection - recommend SPF 30 or higher with frequent application   - Return sooner if noticing changing or symptomatic lesions     Procedures Performed:   None    Follow-up: 1 year(s) in-person, or earlier for new or changing lesions    Staff and Scribe:     Scribe Disclosure:  I, Kyrie Hutton, am serving as a scribe to document services personally performed by Clarisa Ruggiero PA-C based on data collection and the provider's statements to me.     Provider Disclosure:   The documentation recorded by the scribe accurately reflects the services I personally performed and the decisions made by me.    All risks, benefits and alternatives were discussed with patient.  Patient is in agreement and understands the assessment and plan.  All questions were  answered.  Sun Screen Education was given.   Return to Clinic annually or sooner as needed.   Clarisa Ruggiero PA-C   [facility] Dermatology [facility]   ____________________________________________    CC: Skin Check (Marybel is here for a skin check and has no spots of concern.)    HPI:  Ms. Marybel Lyon is a(n) 32 year old female who presents today as a return patient for FBSE. Last seen in dermatology by Dr. Hartman on 9/20/2021, at which time lesions on the left flank, right plantar foot, and left plantar foot were noted to be stable in comparison to prior photodocumentation.    Today, patient denies specific spots of concern on her skin.    Patient is otherwise feeling well, without additional skin concerns.    Labs Reviewed:  N/A    Physical Exam:  Vitals: There were no vitals taken for this visit.  SKIN: Total skin excluding the undergarment areas was performed. The exam included the head/face, neck, both arms, chest, back, abdomen, both legs, digits and/or nails.   - R foot: 5x5 mm dark brown macule with lattice pattern under dermoscopy; several dark brown 2-3 mm macules. No change from previous photographs or dermoscopy.  - L foot  5x5 mm dark brown macule with lattice pattern under dermoscopy; several dark brown 2-3 mm macules. No change from previous photographs or dermoscopy.  - L flank  5x4 mm medium brown to red macule with irregular borders and globular pattern under dermoscopy. No change from previous photographs or dermoscopy.  - There are dome shaped bright red papules on the trunk and extremities.   - Multiple regular brown pigmented macules and papules are identified on the trunk and extremities.   - Scattered brown macules on sun exposed areas.  - No other lesions of concern on areas examined.     Medications:  Current Outpatient Medications   Medication     paragard intrauterine copper device     ustekinumab (STELARA) 90 MG/ML     No current facility-administered medications for  this visit.      Past Medical History:   Patient Active Problem List   Diagnosis     Crohn's disease (H)     ASCUS with positive high risk HPV cervical     Crohn's disease of small intestine (H)     Crohn's disease of colon with intestinal obstruction (H)     Painful menstruation     Multiple nevi     IUD (intrauterine device) in place     Elevated blood pressure reading without diagnosis of hypertension     Past Medical History:   Diagnosis Date     Abnormal Pap smear of cervix 2016     Autoimmune disease (H) 2012    Crohns     Crohn's disease (H)      Crohn's disease (H) 04/08/2021     Hypertension 12/2020    Unsure it previously caused by steroid meds     Uncomplicated asthma     Used inhaler in childhood, no issues as adult        CC Referred Self, MD  No address on file on close of this encounter.      Again, thank you for allowing me to participate in the care of your patient.      Sincerely,    Clarisa Ruggiero PA-C

## 2022-08-22 NOTE — LETTER
Date:August 24, 2022      Patient was self referred, no letter generated. Do not send.        [facility] Health Information

## 2022-08-22 NOTE — PROGRESS NOTES
[facility] Dermatology Note  Encounter Date: Aug 22, 2022  Office Visit     Dermatology Problem List:  1. Nevi to monitor  - Left flank, R plantar foot, L plantar foot - measurements and photos obtained 3/16/21 and stable on 9/20/21, 8/22/2022  - used to follow at [facility] in [facility], records requested 3/16/21  2. Family hx of melanoma in grandfather     ____________________________________________    Assessment & Plan:    # Nevi to monitor  Left flank - photodocumentation in chart. Stable.  R plantar foot -  photodocumentation in chart. Stable.  L plantar foot -  photodocumentation in chart. Stable.     # Cherry angiomas  - Reassured of benign nature, no treatment necessary    # Multiple benign nevi  # Solar lentigines  - No concerning lesions today  - Monitor for ABCDEs of melanoma   - Continue sun protection - recommend SPF 30 or higher with frequent application   - Return sooner if noticing changing or symptomatic lesions     Procedures Performed:   None    Follow-up: 1 year(s) in-person, or earlier for new or changing lesions    Staff and Scribe:     Scribe Disclosure:  I, Kyrie Hutton, am serving as a scribe to document services personally performed by Clarisa Ruggiero PA-C based on data collection and the provider's statements to me.     Provider Disclosure:   The documentation recorded by the scribe accurately reflects the services I personally performed and the decisions made by me.    All risks, benefits and alternatives were discussed with patient.  Patient is in agreement and understands the assessment and plan.  All questions were answered.  Sun Screen Education was given.   Return to Clinic annually or sooner as needed.   Clarisa Ruggiero PA-C   [facility]   ____________________________________________    CC: Skin Check (Marybel is here for a skin check and has no spots of concern.)    HPI:  Ms. Marybel Lyon is a(n) 32  year old female who presents today as a return patient for FBSE. Last seen in dermatology by Dr. Hartman on 9/20/2021, at which time lesions on the left flank, right plantar foot, and left plantar foot were noted to be stable in comparison to prior photodocumentation.    Today, patient denies specific spots of concern on her skin.    Patient is otherwise feeling well, without additional skin concerns.    Labs Reviewed:  N/A    Physical Exam:  Vitals: There were no vitals taken for this visit.  SKIN: Total skin excluding the undergarment areas was performed. The exam included the head/face, neck, both arms, chest, back, abdomen, both legs, digits and/or nails.   - R foot: 5x5 mm dark brown macule with lattice pattern under dermoscopy; several dark brown 2-3 mm macules. No change from previous photographs or dermoscopy.  - L foot  5x5 mm dark brown macule with lattice pattern under dermoscopy; several dark brown 2-3 mm macules. No change from previous photographs or dermoscopy.  - L flank  5x4 mm medium brown to red macule with irregular borders and globular pattern under dermoscopy. No change from previous photographs or dermoscopy.  - There are dome shaped bright red papules on the trunk and extremities.   - Multiple regular brown pigmented macules and papules are identified on the trunk and extremities.   - Scattered brown macules on sun exposed areas.  - No other lesions of concern on areas examined.     Medications:  Current Outpatient Medications   Medication     paragard intrauterine copper device     ustekinumab (STELARA) 90 MG/ML     No current facility-administered medications for this visit.      Past Medical History:   Patient Active Problem List   Diagnosis     Crohn's disease (H)     ASCUS with positive high risk HPV cervical     Crohn's disease of small intestine (H)     Crohn's disease of colon with intestinal obstruction (H)     Painful menstruation     Multiple nevi     IUD (intrauterine device) in  place     Elevated blood pressure reading without diagnosis of hypertension     Past Medical History:   Diagnosis Date     Abnormal Pap smear of cervix 2016     Autoimmune disease (H) 2012    Crohns     Crohn's disease (H)      Crohn's disease (H) 04/08/2021     Hypertension 12/2020    Unsure it previously caused by steroid meds     Uncomplicated asthma     Used inhaler in childhood, no issues as adult        CC Referred Self, MD  No address on file on close of this encounter.

## 2022-08-22 NOTE — NURSING NOTE
Dermatology Rooming Note    Marybel Lyon's goals for this visit include:   Chief Complaint   Patient presents with     Skin Check     Marybel is here for a skin check and has no spots of concern.     Pete Elaine, EMT

## 2022-09-07 ENCOUNTER — PATIENT OUTREACH (OUTPATIENT)
Dept: GASTROENTEROLOGY | Facility: CLINIC | Age: 32
End: 2022-09-07

## 2022-09-07 NOTE — PROGRESS NOTES
September 7, 2022    Called Rehan No message and contact info to return call regarding: return visit appt    Sent portal message with above request.

## 2022-09-12 ENCOUNTER — VIRTUAL VISIT (OUTPATIENT)
Dept: GASTROENTEROLOGY | Facility: CLINIC | Age: 32
End: 2022-09-12
Payer: COMMERCIAL

## 2022-09-12 ENCOUNTER — TELEPHONE (OUTPATIENT)
Dept: GASTROENTEROLOGY | Facility: CLINIC | Age: 32
End: 2022-09-12

## 2022-09-12 DIAGNOSIS — K50.00 CROHN'S DISEASE OF SMALL INTESTINE WITHOUT COMPLICATION (H): Primary | ICD-10-CM

## 2022-09-12 DIAGNOSIS — K50.90 CROHN'S DISEASE WITHOUT COMPLICATION, UNSPECIFIED GASTROINTESTINAL TRACT LOCATION (H): Primary | ICD-10-CM

## 2022-09-12 PROCEDURE — 99214 OFFICE O/P EST MOD 30 MIN: CPT | Mod: 95 | Performed by: INTERNAL MEDICINE

## 2022-09-12 RX ORDER — BUDESONIDE 3 MG/1
9 CAPSULE, COATED PELLETS ORAL EVERY MORNING
Qty: 90 CAPSULE | Refills: 0 | Status: SHIPPED | OUTPATIENT
Start: 2022-09-12 | End: 2022-10-21

## 2022-09-12 NOTE — PROGRESS NOTES
Marybel is a 32 year old who is being evaluated via a billable video visit.      How would you like to obtain your AVS? MyChart  If the video visit is dropped, the invitation should be resent by: Text to cell phone: [phone]  Will anyone else be joining your video visit? No      Video-Visit Details    Video Start Time: 12:02 PM    Type of service:  Video Visit    Video End Time:12:35 PM    Originating Location (pt. Location): Home    Distant Location (provider location):  [facility] SPECIALTY [facility]     Platform used for Video Visit: [employer]

## 2022-09-12 NOTE — TELEPHONE ENCOUNTER
----- Message from Shiv Jones MD sent at 9/12/2022 12:30 PM CDT -----  Peter Rodriguez/Dario No is taking a trip to [place] in Jan/Feb    Can we give her rifaxamin (or cipro if not covered) and budesonide (or prednisone if not covered) for her travel?    Thanks!     Shiv Jones MD [place], Ann, RN; Dario Sampson, RN  Also     I ordered labs, but can we also add a stelara level?   I told her to get her labs done day before next injection     And she should follow-up with [facility] clinic in 6 months       Thanks!

## 2022-09-12 NOTE — TELEPHONE ENCOUNTER
Hello,    Can you please fill out and scan into the patients chart a [facility] lab order form for this patient.    Medication:  Selara   Dose: 90mg   Frequency:every 6 weeks  Provider: Robert  When is lab due: prior to next dose  Does the patient need a lab appointment: yes    Thank you

## 2022-09-12 NOTE — TELEPHONE ENCOUNTER
Order Stelara Levels to be drawn prior to Marybel's next dose, order rifaximin and budesonide.    September 12, 2022    Called Marybel, Left message and contact info to return call regarding: new orders    Hi Team,  Requesting PA for 2 medication below.    Thank you.  Dario      Prior Authorization Retail Medication Request    Medication/Dose:    Disp Refills Start End ARJUN   budesonide (ENTOCORT EC) 3 MG EC capsule 90 capsule 0 9/12/2022 10/12/2022 --   Sig - Route: Take 3 capsules (9 mg) by mouth every morning for 30 days - Oral   Sent to pharmacy as: Budesonide 3 MG Oral Capsule Delayed Release Particles (ENTOCORT EC)   Class: E-Prescribe   Order: [id number]   E-Prescribing Status: Receipt confirmed by pharmacy (9/12/2022  3:14 PM CDT)          Disp Refills Start End ARJUN   rifaximin (XIFAXAN) 200 MG tablet 60 tablet 0 9/12/2022 9/22/2022 --   Sig - Route: Take 2 tablets (400 mg) by mouth 3 times daily for 10 days - Oral   Sent to pharmacy as: rifAXIMin 200 MG Oral Tablet (XIFAXAN)   Class: E-Prescribe   Order: [id number]   E-Prescribing Status: Receipt confirmed by pharmacy (9/12/2022  3:14 PM CDT)       ICD code (if different than what is on RX):    Previously Tried and Failed:    Rationale:      Insurance Name:    Insurance ID:        Pharmacy Information (if different than what is on RX)  Name:    Phone:

## 2022-09-12 NOTE — PATIENT INSTRUCTIONS
It was great to meet you today. Overall you are doing great and no major changes are needed.     -- Blood work including a stelara level     -- Colonoscopy to monitor your Crohn's    -- Next visit in 6 months with the IBD-pregnancy clinic ([facility])  [email or web address]    -- Antibiotics and steroids as needed for travel to [place] (exact antibiotic and steroid based on insurance approval).

## 2022-09-12 NOTE — PROGRESS NOTES
IBD CLINIC VISIT     CC/REFERRING MD:  Dr. Akin Bernal  REASON FOR FOLLOW UP: Crohn's    ASSESSMENT/PLAN  32 year old female with Crohn's disease of the small bowel (ileum and likely jejunum)    1. Small bowel crohn's disease:    Current medication:    Ustekinumab every 8 weeks (started 5/2021)   Ustekinumab every 6 weeks (Spring 2022)  Current clinical disease activity: remission, HBI 0   Last endoscopic disease activity: colonoscopy 1/2021 showing mild disease with small ulcers in TI (no significant stricturing disease and allowed for deep intubation of ileum) on biopsies,   Last radiographic disease assessment: MRE 1/17/22 with moderate segmental length of ileum (10cm approx) with diffuse wall thickening consistent with acute and chronic inflammation.    She continues to be in clinical remission at this time.  She has not been restaged endoscopically since starting Ustekinumab.  MR enterography with active inflammation in her Ustekinumab dose was increased to every 6 weeks.  Given that she is thinking about starting a family in the next few years we discussed the importance of monitoring for deep remission.  We will plan for colonoscopy as well as repeat Ustekinumab level.  Based on these results we may change her to every 4-week dosing.  Otherwise we will plan on repeating the MR enterography 1 year from prior.    -- Blood work including Stelara level and vitamin B12  -- Colonoscopy for disease assessment of Crohn's disease  -- Next visit with IBD-MFM clinic.   -- Prescription for antibiotics and steroids for travel to [place]    Colon cancer screening:  Given disease is limited to small bowel, colon cancer screening is recommended at age 45.     Misc:  -- Avoid tobacco use  -- Avoid NSAIDs as there is potentially a 25% chance of causing an IBD flare    RTC 6 months    Shiv Jones MD MS    [facility]  Inflammatory Bowel Disease Program  Division of Gastroenterology, Hepatology and  Nutrition  Pager: [phone]     CROHN'S HISTORY:  Age at diagnosis: 2012  Extent of disease: small bowel   Disease phenotype: inflammatory  Chayito-anal disease: none  Prior IBD surgeries: none  Prior IBD Medications:    50 mg 6MP    DRUG MONITORING  TPMT enzyme activity: 40.4 (6/14/2012)    6-TGN/6-MMPN levels: --    Biologic concentration:  Usteinumab 2/2022 = 1.6, moved to every 6 weeks     HPI:   Goals for today:     1) Travelling to [place] in Feb. Wondering if she should take anything with her.     2) Interested in pregannacy couneliing       On stelara > 1 year     Currently, stools about once a day. Formed stools. No blood. Will have some rare abdominal pain - mostly situational or diet.     Two months ago, she had an ovarian cyst rupture.     HBI:  Overall patient well being (prior day): 0 (Very well)  Abdominal pain (prior day): 0 (None)  Number of liquid or soft stools (prior day): 0 (1 point per stool)  Abdominal mass on exam:   Complications (1 point for each):   None    Remission <5  Mild activity 5-7  Moderate activity 8-16  Severe > 16      Extra intestinal manifestations of IBD:  No uveitis/episcleritis  No aphthous ulcers   No arthritis   No erythema nodosum/pyoderma gangrenosum.       ROS:  Constitutional, HEENT, cardiovascular, pulmonary, GI, , musculoskeletal, neuro, skin, endocrine and psych systems are negative, except as otherwise noted.     PERTINENT PAST MEDICAL HISTORY:  Past Medical History:   Diagnosis Date     Abnormal Pap smear of cervix 2016     Autoimmune disease (H) 2012    Crohns     Crohn's disease (H)      Crohn's disease (H) 04/08/2021     Hypertension 12/2020    Unsure it previously caused by steroid meds     Uncomplicated asthma     Used inhaler in childhood, no issues as adult       PREVIOUS SURGERIES:  Past Surgical History:   Procedure Laterality Date     COLONOSCOPY       NO HISTORY OF SURGERY       ALLERGIES:     Allergies   Allergen Reactions     Other Environmental Allergy  Itching and Visual Disturbance       PERTINENT MEDICATIONS:    Current Outpatient Medications:      paragard intrauterine copper device, 1 each by Intrauterine route once, Disp: , Rfl:      ustekinumab (STELARA) 90 MG/ML, Inject 1 ml ( 90 mg) subcutaneous every 6 weeks., Disp: 1 mL, Rfl: 5    SOCIAL HISTORY:  Social History     Socioeconomic History     Marital status: Single     Spouse name: Not on file     Number of children: Not on file     Years of education: Not on file     Highest education level: Not on file   Occupational History     Not on file   Tobacco Use     Smoking status: Never Smoker     Smokeless tobacco: Never Used   Substance and Sexual Activity     Alcohol use: Yes     Drug use: Never     Sexual activity: Yes     Partners: Male     Birth control/protection: Condom, None   Other Topics Concern     Parent/sibling w/ CABG, MI or angioplasty before 65F 55M? No   Social History Narrative     Not on file     Social Determinants of Health     Financial Resource Strain: Not on file   Food Insecurity: Not on file   Transportation Needs: Not on file   Physical Activity: Not on file   Stress: Not on file   Social Connections: Not on file   Intimate Partner Violence: Not on file   Housing Stability: Not on file       FAMILY HISTORY:  Cousin has UC  Family History   Problem Relation Age of Onset     Other - See Comments Mother         Hysterectomy     Hypertension Father      Cerebrovascular Disease Maternal Grandmother 50     Colon Cancer Maternal Grandfather         60s     Melanoma Maternal Grandfather      Diabetes Type 1 Paternal Grandmother      Other - See Comments Paternal Grandmother         uterine prolapse     Heart Disease Paternal Grandfather 50        Heart attack     Hypertension Cousin        Past/family/social history reviewed and no changes    PHYSICAL EXAMINATION:  Constitutional: aaox3, cooperative, pleasant, not dyspneic/diaphoretic, no acute distress  Vitals reviewed: There were no  vitals taken for this visit.  Wt:   Wt Readings from Last 2 Encounters:   07/17/22 54.4 kg (120 lb)   06/09/22 56.1 kg (123 lb 9.6 oz)      Constitutional - general appearance is well and in no acute distress. Body habitus normal  Eyes - No redness or discharge  Respiratory - No cough, unlabored breathing  Musculoskeletal - range of motion intact: Neck and arms  Skin - No discoloration or lesions  Neurological - No tremors, headaches  Psychiatric - No anxiety, alert & oriented

## 2022-09-13 ENCOUNTER — DOCUMENTATION ONLY (OUTPATIENT)
Dept: GASTROENTEROLOGY | Facility: CLINIC | Age: 32
End: 2022-09-13

## 2022-09-13 ENCOUNTER — TELEPHONE (OUTPATIENT)
Dept: GASTROENTEROLOGY | Facility: CLINIC | Age: 32
End: 2022-09-13

## 2022-09-13 NOTE — TELEPHONE ENCOUNTER
Spoke with Marybel, [place] She was notified that both Rx are approved and ready when she needs it.

## 2022-09-13 NOTE — TELEPHONE ENCOUNTER
Screening Questions    BlueKIND OF PREP RedLOCATION [review exclusion criteria] GreenSEDATION TYPE      1. Are you active on mychart? y    2. What insurance is in the chart? BCBS     3.   Ordering/Referring Provider: Shiv Jones MD       4. BMI   (If greater than 40 review exclusion criteria [PAC APPT IF [MAC] @ [facility])  20.6  [If yes, BMI OVER 40-EXTENDED PREP]      **(Sedation review/consideration needed)**  Do you have a legal guardian or Medical Power of    and/or are you able to give consent for your medical care?     Can give consent    5. Have you had a positive covid test in the last 90 days?   n -     6.  Are you currently on dialysis?   n [ If yes, G-PREP & HOSPITAL setting ONLY]     7.  Do you have chronic kidney disease?  n [ If yes, G-PREP ]    8.   Do you have a diagnosis of diabetes?   n   [ If yes, G-PREP ]    9.  On a regular basis do you go 3-5 days between bowel movements?   n   [ If yes, EXTENDED PREP]    10.  Are you taking any prescription pain medications on a routine schedule?    n -  [ If yes, EXTENDED PREP] [If yes, MAC]      11.   Do you have any chemical dependencies such as alcohol, street drugs, or methadone?    n [If yes, MAC]    12.   Do you have any history of post-traumatic stress syndrome, severe anxiety or history of psychosis?    Y, per order  [If yes, MAC]    13.  [FEMALES] Are you currently pregnant? n    If yes, how many weeks?       Respiratory/Heart Screening:  [If yes to any of the following HOSPITAL setting only]     14. Do you have Pulmonary Hypertension [Lungs]?   n       15. Do you have UNCONTROLLED asthma?   n     16.  Do you use daily home oxygen?  n      17. Do you have mod to severe Obstructive Sleep Apnea?         (OKAY @ [facility]  [facility]  SH  PH  RI  MG - if pt is not on OXYGEN)  n      18.   Have you had a heart or lung transplant?   n      19.   Have you had a stroke or Transient ischemic attack (TIA - aka  mini stroke ) within 6 months?  (If yes,  please review exclusion criteria)  n     20.   In the past 6 months, have you had any heart related issues including cardiomyopathy or heart attack?   n           If yes, did it require cardiac stenting or other implantable device?         21.   Do you have any implantable devices in your body (pacemaker, defib, LVAD)? (If yes, please review exclusion criteria)  n   22.  Do you take the medication Phentermine?     Yes-> Hold for 7 days before procedure.  Please consult your prescribing provider if you have questions about holding this medication.     No-> Continue to next question.    23. Do you take nitroglycerin?   n           If yes, how often?   (if yes, HOSPITAL setting ONLY)    24.  Are you currently taking any blood thinners?    [If yes, INFORM patient to follw up w/ ORDERING PROVIDER FOR BRIDGING INSTRUCTIONS]     n    25.   Do you transfer independently?                (If NO, please HOSPITAL setting ONLY)  y    26.   Preferred LOCAL Pharmacy for Pre Prescription:        [id number] - [place], [place] - [address]  AT [address]    Scheduling Details  (Please ask for phone number if not scheduled by patient)      Caller : Marybel Lyon    Date of Procedure: 11/30  Surgeon: Robert  Location: [facility]        Sedation Type: MAC l per order anxiety  Conscious Sedation- Needs  for 6 hours after the procedure  MAC/General-Needs  for 24 hours after procedure    n :[Pre-op Required] at [facility]  SH  MG and OR for MAC sedation   (advise patient they will need a pre-op WITH IN 30 DAYS of procedure date)     Type of Procedure Scheduled:   Lower Endoscopy [Colonoscopy]    Which Colonoscopy Prep was Sent?:   [facility] - mag citrate recall      [facility] PATIENTS & [facility]S PATIENTS NEEDS EXTENDED PREP       Informed patient they will need an adult  y  Cannot take any type of public or medical transportation alone    Pre-Procedure Covid test to be completed  at [facility] or Externally: home test  **INFORMED OF HOME TESTING & LAB OPTION**        Confirmed Nurse will call to complete assessment y    Additional comments:

## 2022-09-13 NOTE — TELEPHONE ENCOUNTER
Prior Authorization Not Needed per Insurance    Medication: rifaximin (XIFAXAN) 200 MG tablet-PA NOT NEEDED/ALLOWED   Insurance Company: [employer] - Phone [phone] Fax [phone]  Expected CoPay:      [facility]armacy Filling the Rx[facility] [place] [place] [facility] [address]  Pharmacy Notified: Yes  Patient Notified: No      Called insurance and Rep Hailey stated that medication is covered with Qty Limit 9 tabs per 30 days and 27 tabs per 90 days with No PA Qty Limit is available/Allowed for 200 mg and 550 mg strengths. Requested if there is any other option for consideration for coverage for qty and Hailey stated that there are no other options available. Notify filling pharmacy on qty coverage allowed by patients benefit plan.

## 2022-09-13 NOTE — TELEPHONE ENCOUNTER
Open New Encounter for PA Request On budesonide (ENTOCORT EC) 3 MG EC capsule. See New Encounter for status/outcome.

## 2022-09-14 ENCOUNTER — MEDICAL CORRESPONDENCE (OUTPATIENT)
Dept: HEALTH INFORMATION MANAGEMENT | Facility: CLINIC | Age: 32
End: 2022-09-14

## 2022-09-14 DIAGNOSIS — K50.90 CROHN'S DISEASE WITHOUT COMPLICATION, UNSPECIFIED GASTROINTESTINAL TRACT LOCATION (H): Primary | ICD-10-CM

## 2022-09-24 ENCOUNTER — HEALTH MAINTENANCE LETTER (OUTPATIENT)
Age: 32
End: 2022-09-24

## 2022-09-29 NOTE — PATIENT INSTRUCTIONS
It was a pleasure taking care of you today.  I've included a brief summary of our discussion and care plan from today's visit below.  Please review this information with your primary care provider.  ______________________________________________________________________    My recommendations are summarized as follows:    -- Labs in 3 months  -- Move to every 6 week dosing  -- GI health psychology referral   -- Patient with IBD we recommend supplementation vitamin D 1000 units daily and calcium 500 mg twice daily.  Recommend yearly flu shot, pneumonia vaccines (Prevnar 13 then 8 weeks later Pneumovax 23 then 5 years later Pneumovax 23), tetanus every 10 years.  -- No NSAIDs (ibuprofen, or anything containing ibuprofen)       For additional resources about inflammatory bowel disease visit [email or web address].org/    You can schedule your DEXA scan by calling [phone].  The scan is done on the first floor at the [facility].  The appointment is 30 minutes.  It is recommended that you wear light clothing, no zippers and no metal (including underwire of bra)  You are to withhold your calcium supplement for 24 hours prior to the scan.        Return to GI Clinic in 6 months to review your progress.    ______________________________________________________________________    Who do I call with any questions after my visit?  Please be in touch if there are any further questions that arise following today's visit.  There are multiple ways to contact your gastroenterology care team.        During business hours, you may reach a Gastroenterology nurse at [phone], option 3.       To schedule or reschedule an appointment, please call [phone].       You can always send a secure message through [employer].  [employer] messages are answered by your nurse or doctor typically within 24 hours.  Please allow extra time on weekends and holidays.        For urgent/emergent questions after  business hours, you may reach the on-call GI Fellow by contacting the [facility]  at [phone].      In order for your refill to be processed in a timely fashion, it is your responsibility to ensure you follow the recommendations from your provider regarding your laboratory studies and follow up appointments.      If an imaging study has been ordered for you, please call [phone] to schedule.       If you are scheduled for an endoscopy (colonoscopy or upper endoscopy) and have not heard from the endoscopy team within a week, please call [phone] to schedule.        How will I get the results of any tests ordered?    You will receive all of your results.  If you have signed up for [employer], any tests ordered at your visit will be available to you after your physician reviews them.  Typically this takes 1-2 weeks.  If there are urgent results that require a change in your care plan, your physician or nurse will call you to discuss the next steps.      What is [employer]?  [employer] is a secure way for you to access all of your healthcare records from the [facility].  It is a web based computer program, so you can sign on to it from any location.  It also allows you to send secure messages to your care team.  I recommend signing up for [employer] access if you have not already done so and are comfortable with using a computer.      How to I schedule a follow-up visit?  If you did not schedule a follow-up visit today, please call [phone] to schedule a follow-up office visit.        Sincerely,    Shady Caceres PA-C  [facility]  Division of Gastroenterology         no

## 2022-10-19 ENCOUNTER — LAB (OUTPATIENT)
Dept: LAB | Facility: CLINIC | Age: 32
End: 2022-10-19
Payer: COMMERCIAL

## 2022-10-19 DIAGNOSIS — K50.00 CROHN'S DISEASE OF SMALL INTESTINE WITHOUT COMPLICATION (H): ICD-10-CM

## 2022-10-19 DIAGNOSIS — Z13.29 SCREENING FOR THYROID DISORDER: ICD-10-CM

## 2022-10-19 DIAGNOSIS — Z00.00 VISIT FOR PREVENTIVE HEALTH EXAMINATION: ICD-10-CM

## 2022-10-19 DIAGNOSIS — K50.90 CROHN'S DISEASE WITHOUT COMPLICATION, UNSPECIFIED GASTROINTESTINAL TRACT LOCATION (H): ICD-10-CM

## 2022-10-19 LAB
ALBUMIN SERPL BCG-MCNC: 4.1 G/DL (ref 3.5–5.2)
ALP SERPL-CCNC: 38 U/L (ref 35–104)
ALT SERPL W P-5'-P-CCNC: 15 U/L (ref 10–35)
AST SERPL W P-5'-P-CCNC: 21 U/L (ref 10–35)
BILIRUB DIRECT SERPL-MCNC: <0.2 MG/DL (ref 0–0.3)
BILIRUB SERPL-MCNC: 0.4 MG/DL
CRP SERPL-MCNC: <3 MG/L
ERYTHROCYTE [DISTWIDTH] IN BLOOD BY AUTOMATED COUNT: 13.2 % (ref 10–15)
ERYTHROCYTE [SEDIMENTATION RATE] IN BLOOD BY WESTERGREN METHOD: 8 MM/HR (ref 0–20)
HCT VFR BLD AUTO: 37.3 % (ref 35–47)
HGB BLD-MCNC: 11.5 G/DL (ref 11.7–15.7)
MCH RBC QN AUTO: 25.7 PG (ref 26.5–33)
MCHC RBC AUTO-ENTMCNC: 30.8 G/DL (ref 31.5–36.5)
MCV RBC AUTO: 83 FL (ref 78–100)
PLATELET # BLD AUTO: 307 10E3/UL (ref 150–450)
PROT SERPL-MCNC: 6.4 G/DL (ref 6.4–8.3)
RBC # BLD AUTO: 4.47 10E6/UL (ref 3.8–5.2)
TSH SERPL DL<=0.005 MIU/L-ACNC: 1.23 UIU/ML (ref 0.3–4.2)
VIT B12 SERPL-MCNC: 702 PG/ML (ref 232–1245)
WBC # BLD AUTO: 5.1 10E3/UL (ref 4–11)

## 2022-10-19 PROCEDURE — 84443 ASSAY THYROID STIM HORMONE: CPT | Performed by: PATHOLOGY

## 2022-10-19 PROCEDURE — 82542 COL CHROMOTOGRAPHY QUAL/QUAN: CPT | Performed by: PATHOLOGY

## 2022-10-19 PROCEDURE — 36415 COLL VENOUS BLD VENIPUNCTURE: CPT | Performed by: PATHOLOGY

## 2022-10-19 PROCEDURE — 99000 SPECIMEN HANDLING OFFICE-LAB: CPT | Performed by: PATHOLOGY

## 2022-10-19 PROCEDURE — 85652 RBC SED RATE AUTOMATED: CPT | Performed by: PATHOLOGY

## 2022-10-19 PROCEDURE — 82607 VITAMIN B-12: CPT | Performed by: PATHOLOGY

## 2022-10-19 PROCEDURE — 86140 C-REACTIVE PROTEIN: CPT | Performed by: PATHOLOGY

## 2022-10-19 PROCEDURE — 80299 QUANTITATIVE ASSAY DRUG: CPT | Performed by: PATHOLOGY

## 2022-10-19 PROCEDURE — 80076 HEPATIC FUNCTION PANEL: CPT | Performed by: PATHOLOGY

## 2022-10-19 PROCEDURE — 85027 COMPLETE CBC AUTOMATED: CPT | Performed by: PATHOLOGY

## 2022-10-20 PROCEDURE — 99285 EMERGENCY DEPT VISIT HI MDM: CPT

## 2022-10-21 ENCOUNTER — APPOINTMENT (OUTPATIENT)
Dept: GENERAL RADIOLOGY | Facility: CLINIC | Age: 32
End: 2022-10-21
Attending: EMERGENCY MEDICINE
Payer: COMMERCIAL

## 2022-10-21 ENCOUNTER — HOSPITAL ENCOUNTER (INPATIENT)
Facility: CLINIC | Age: 32
LOS: 2 days | Discharge: HOME | End: 2022-10-23
Attending: EMERGENCY MEDICINE | Admitting: HOSPITALIST
Payer: COMMERCIAL

## 2022-10-21 DIAGNOSIS — K56.609 SBO (SMALL BOWEL OBSTRUCTION) (H): ICD-10-CM

## 2022-10-21 DIAGNOSIS — K50.80 CROHN'S DISEASE OF BOTH SMALL AND LARGE INTESTINE WITHOUT COMPLICATION (H): ICD-10-CM

## 2022-10-21 LAB
ALBUMIN SERPL-MCNC: 3.7 G/DL (ref 3.4–5)
ALP SERPL-CCNC: 40 U/L (ref 40–150)
ALT SERPL W P-5'-P-CCNC: 27 U/L (ref 0–50)
ANION GAP SERPL CALCULATED.3IONS-SCNC: 9 MMOL/L (ref 3–14)
AST SERPL W P-5'-P-CCNC: 24 U/L (ref 0–45)
BASOPHILS # BLD AUTO: 0 10E3/UL (ref 0–0.2)
BASOPHILS NFR BLD AUTO: 0 %
BILIRUB SERPL-MCNC: 0.4 MG/DL (ref 0.2–1.3)
BUN SERPL-MCNC: 9 MG/DL (ref 7–30)
CALCIUM SERPL-MCNC: 9 MG/DL (ref 8.5–10.1)
CHLORIDE BLD-SCNC: 106 MMOL/L (ref 94–109)
CO2 SERPL-SCNC: 26 MMOL/L (ref 20–32)
CREAT SERPL-MCNC: 0.64 MG/DL (ref 0.52–1.04)
CRP SERPL-MCNC: <2.9 MG/L (ref 0–8)
EOSINOPHIL # BLD AUTO: 0.1 10E3/UL (ref 0–0.7)
EOSINOPHIL NFR BLD AUTO: 1 %
ERYTHROCYTE [DISTWIDTH] IN BLOOD BY AUTOMATED COUNT: 13.3 % (ref 10–15)
ERYTHROCYTE [SEDIMENTATION RATE] IN BLOOD BY WESTERGREN METHOD: 11 MM/HR (ref 0–20)
GFR SERPL CREATININE-BSD FRML MDRD: >90 ML/MIN/1.73M2
GLUCOSE BLD-MCNC: 159 MG/DL (ref 70–99)
HCG SERPL QL: NEGATIVE
HCT VFR BLD AUTO: 35.9 % (ref 35–47)
HGB BLD-MCNC: 11.7 G/DL (ref 11.7–15.7)
HOLD SPECIMEN: NORMAL
IMM GRANULOCYTES # BLD: 0.1 10E3/UL
IMM GRANULOCYTES NFR BLD: 0 %
LIPASE SERPL-CCNC: 127 U/L (ref 73–393)
LYMPHOCYTES # BLD AUTO: 0.9 10E3/UL (ref 0.8–5.3)
LYMPHOCYTES NFR BLD AUTO: 7 %
MCH RBC QN AUTO: 26.7 PG (ref 26.5–33)
MCHC RBC AUTO-ENTMCNC: 32.6 G/DL (ref 31.5–36.5)
MCV RBC AUTO: 82 FL (ref 78–100)
MONOCYTES # BLD AUTO: 0.7 10E3/UL (ref 0–1.3)
MONOCYTES NFR BLD AUTO: 6 %
NEUTROPHILS # BLD AUTO: 11.7 10E3/UL (ref 1.6–8.3)
NEUTROPHILS NFR BLD AUTO: 86 %
NRBC # BLD AUTO: 0 10E3/UL
NRBC BLD AUTO-RTO: 0 /100
PLATELET # BLD AUTO: 299 10E3/UL (ref 150–450)
POTASSIUM BLD-SCNC: 3.6 MMOL/L (ref 3.4–5.3)
PROT SERPL-MCNC: 6.9 G/DL (ref 6.8–8.8)
RBC # BLD AUTO: 4.39 10E6/UL (ref 3.8–5.2)
SARS-COV-2 RNA RESP QL NAA+PROBE: NEGATIVE
SODIUM SERPL-SCNC: 141 MMOL/L (ref 133–144)
WBC # BLD AUTO: 13.5 10E3/UL (ref 4–11)

## 2022-10-21 PROCEDURE — 250N000012 HC RX MED GY IP 250 OP 636 PS 637: Performed by: INTERNAL MEDICINE

## 2022-10-21 PROCEDURE — 85025 COMPLETE CBC W/AUTO DIFF WBC: CPT | Performed by: EMERGENCY MEDICINE

## 2022-10-21 PROCEDURE — U0005 INFEC AGEN DETEC AMPLI PROBE: HCPCS | Performed by: EMERGENCY MEDICINE

## 2022-10-21 PROCEDURE — 82040 ASSAY OF SERUM ALBUMIN: CPT | Performed by: EMERGENCY MEDICINE

## 2022-10-21 PROCEDURE — 250N000011 HC RX IP 250 OP 636: Performed by: EMERGENCY MEDICINE

## 2022-10-21 PROCEDURE — 85652 RBC SED RATE AUTOMATED: CPT | Performed by: STUDENT IN AN ORGANIZED HEALTH CARE EDUCATION/TRAINING PROGRAM

## 2022-10-21 PROCEDURE — 120N000001 HC R&B MED SURG/OB

## 2022-10-21 PROCEDURE — 74019 RADEX ABDOMEN 2 VIEWS: CPT

## 2022-10-21 PROCEDURE — 96374 THER/PROPH/DIAG INJ IV PUSH: CPT

## 2022-10-21 PROCEDURE — 96361 HYDRATE IV INFUSION ADD-ON: CPT

## 2022-10-21 PROCEDURE — 258N000003 HC RX IP 258 OP 636: Performed by: EMERGENCY MEDICINE

## 2022-10-21 PROCEDURE — 250N000013 HC RX MED GY IP 250 OP 250 PS 637: Performed by: HOSPITALIST

## 2022-10-21 PROCEDURE — 86140 C-REACTIVE PROTEIN: CPT | Performed by: STUDENT IN AN ORGANIZED HEALTH CARE EDUCATION/TRAINING PROGRAM

## 2022-10-21 PROCEDURE — 258N000003 HC RX IP 258 OP 636: Performed by: HOSPITALIST

## 2022-10-21 PROCEDURE — 84703 CHORIONIC GONADOTROPIN ASSAY: CPT | Performed by: EMERGENCY MEDICINE

## 2022-10-21 PROCEDURE — 80053 COMPREHEN METABOLIC PANEL: CPT | Performed by: EMERGENCY MEDICINE

## 2022-10-21 PROCEDURE — 83690 ASSAY OF LIPASE: CPT | Performed by: EMERGENCY MEDICINE

## 2022-10-21 PROCEDURE — 96375 TX/PRO/DX INJ NEW DRUG ADDON: CPT

## 2022-10-21 PROCEDURE — C9803 HOPD COVID-19 SPEC COLLECT: HCPCS

## 2022-10-21 PROCEDURE — 36415 COLL VENOUS BLD VENIPUNCTURE: CPT | Performed by: EMERGENCY MEDICINE

## 2022-10-21 PROCEDURE — 36415 COLL VENOUS BLD VENIPUNCTURE: CPT | Performed by: STUDENT IN AN ORGANIZED HEALTH CARE EDUCATION/TRAINING PROGRAM

## 2022-10-21 PROCEDURE — 99223 1ST HOSP IP/OBS HIGH 75: CPT | Mod: AI | Performed by: HOSPITALIST

## 2022-10-21 PROCEDURE — 250N000012 HC RX MED GY IP 250 OP 636 PS 637: Performed by: EMERGENCY MEDICINE

## 2022-10-21 PROCEDURE — 96376 TX/PRO/DX INJ SAME DRUG ADON: CPT

## 2022-10-21 RX ORDER — PROCHLORPERAZINE MALEATE 10 MG
10 TABLET ORAL EVERY 6 HOURS PRN
Status: DISCONTINUED | OUTPATIENT
Start: 2022-10-21 | End: 2022-10-23 | Stop reason: HOSPADM

## 2022-10-21 RX ORDER — ONDANSETRON 2 MG/ML
4 INJECTION INTRAMUSCULAR; INTRAVENOUS EVERY 30 MIN PRN
Status: DISCONTINUED | OUTPATIENT
Start: 2022-10-21 | End: 2022-10-23 | Stop reason: HOSPADM

## 2022-10-21 RX ORDER — NALOXONE HYDROCHLORIDE 0.4 MG/ML
0.2 INJECTION, SOLUTION INTRAMUSCULAR; INTRAVENOUS; SUBCUTANEOUS
Status: DISCONTINUED | OUTPATIENT
Start: 2022-10-21 | End: 2022-10-23 | Stop reason: HOSPADM

## 2022-10-21 RX ORDER — ACETAMINOPHEN 325 MG/1
650 TABLET ORAL EVERY 6 HOURS PRN
Status: DISCONTINUED | OUTPATIENT
Start: 2022-10-21 | End: 2022-10-23 | Stop reason: HOSPADM

## 2022-10-21 RX ORDER — PROCHLORPERAZINE 25 MG
25 SUPPOSITORY, RECTAL RECTAL EVERY 12 HOURS PRN
Status: DISCONTINUED | OUTPATIENT
Start: 2022-10-21 | End: 2022-10-23 | Stop reason: HOSPADM

## 2022-10-21 RX ORDER — LIDOCAINE 40 MG/G
CREAM TOPICAL
Status: DISCONTINUED | OUTPATIENT
Start: 2022-10-21 | End: 2022-10-23 | Stop reason: HOSPADM

## 2022-10-21 RX ORDER — PREDNISONE 20 MG/1
20 TABLET ORAL DAILY
Status: DISCONTINUED | OUTPATIENT
Start: 2022-11-03 | End: 2022-10-23 | Stop reason: HOSPADM

## 2022-10-21 RX ORDER — HYDROMORPHONE HYDROCHLORIDE 1 MG/ML
0.5 INJECTION, SOLUTION INTRAMUSCULAR; INTRAVENOUS; SUBCUTANEOUS
Status: DISCONTINUED | OUTPATIENT
Start: 2022-10-21 | End: 2022-10-23 | Stop reason: HOSPADM

## 2022-10-21 RX ORDER — NALOXONE HYDROCHLORIDE 0.4 MG/ML
0.4 INJECTION, SOLUTION INTRAMUSCULAR; INTRAVENOUS; SUBCUTANEOUS
Status: DISCONTINUED | OUTPATIENT
Start: 2022-10-21 | End: 2022-10-23 | Stop reason: HOSPADM

## 2022-10-21 RX ORDER — PREDNISONE 20 MG/1
40 TABLET ORAL DAILY
Status: DISCONTINUED | OUTPATIENT
Start: 2022-10-21 | End: 2022-10-23 | Stop reason: HOSPADM

## 2022-10-21 RX ORDER — ACETAMINOPHEN 650 MG/1
650 SUPPOSITORY RECTAL EVERY 6 HOURS PRN
Status: DISCONTINUED | OUTPATIENT
Start: 2022-10-21 | End: 2022-10-23 | Stop reason: HOSPADM

## 2022-10-21 RX ORDER — PREDNISONE 10 MG/1
10 TABLET ORAL DAILY
Status: DISCONTINUED | OUTPATIENT
Start: 2022-11-10 | End: 2022-10-23 | Stop reason: HOSPADM

## 2022-10-21 RX ORDER — ONDANSETRON 4 MG/1
4 TABLET, ORALLY DISINTEGRATING ORAL EVERY 6 HOURS PRN
Status: DISCONTINUED | OUTPATIENT
Start: 2022-10-21 | End: 2022-10-23 | Stop reason: HOSPADM

## 2022-10-21 RX ORDER — ONDANSETRON 2 MG/ML
4 INJECTION INTRAMUSCULAR; INTRAVENOUS ONCE
Status: COMPLETED | OUTPATIENT
Start: 2022-10-21 | End: 2022-10-21

## 2022-10-21 RX ORDER — PREDNISONE 20 MG/1
40 TABLET ORAL ONCE
Status: COMPLETED | OUTPATIENT
Start: 2022-10-21 | End: 2022-10-21

## 2022-10-21 RX ORDER — SODIUM CHLORIDE, SODIUM LACTATE, POTASSIUM CHLORIDE, CALCIUM CHLORIDE 600; 310; 30; 20 MG/100ML; MG/100ML; MG/100ML; MG/100ML
INJECTION, SOLUTION INTRAVENOUS CONTINUOUS
Status: DISCONTINUED | OUTPATIENT
Start: 2022-10-21 | End: 2022-10-23 | Stop reason: HOSPADM

## 2022-10-21 RX ORDER — ONDANSETRON 2 MG/ML
4 INJECTION INTRAMUSCULAR; INTRAVENOUS EVERY 6 HOURS PRN
Status: DISCONTINUED | OUTPATIENT
Start: 2022-10-21 | End: 2022-10-23 | Stop reason: HOSPADM

## 2022-10-21 RX ORDER — HYDROMORPHONE HCL IN WATER/PF 6 MG/30 ML
.2-.3 PATIENT CONTROLLED ANALGESIA SYRINGE INTRAVENOUS
Status: DISCONTINUED | OUTPATIENT
Start: 2022-10-21 | End: 2022-10-23 | Stop reason: HOSPADM

## 2022-10-21 RX ADMIN — ONDANSETRON 4 MG: 2 INJECTION INTRAMUSCULAR; INTRAVENOUS at 00:24

## 2022-10-21 RX ADMIN — PREDNISONE 40 MG: 20 TABLET ORAL at 01:27

## 2022-10-21 RX ADMIN — ACETAMINOPHEN 650 MG: 325 TABLET, FILM COATED ORAL at 20:35

## 2022-10-21 RX ADMIN — PREDNISONE 40 MG: 20 TABLET ORAL at 12:43

## 2022-10-21 RX ADMIN — ONDANSETRON 4 MG: 2 INJECTION INTRAMUSCULAR; INTRAVENOUS at 01:29

## 2022-10-21 RX ADMIN — SODIUM CHLORIDE, POTASSIUM CHLORIDE, SODIUM LACTATE AND CALCIUM CHLORIDE 1000 ML: 600; 310; 30; 20 INJECTION, SOLUTION INTRAVENOUS at 02:25

## 2022-10-21 RX ADMIN — FAMOTIDINE 20 MG: 10 INJECTION, SOLUTION INTRAVENOUS at 01:29

## 2022-10-21 RX ADMIN — SODIUM CHLORIDE, POTASSIUM CHLORIDE, SODIUM LACTATE AND CALCIUM CHLORIDE: 600; 310; 30; 20 INJECTION, SOLUTION INTRAVENOUS at 09:35

## 2022-10-21 RX ADMIN — HYDROMORPHONE HYDROCHLORIDE 0.5 MG: 1 INJECTION, SOLUTION INTRAMUSCULAR; INTRAVENOUS; SUBCUTANEOUS at 03:43

## 2022-10-21 RX ADMIN — HYDROMORPHONE HYDROCHLORIDE 0.5 MG: 1 INJECTION, SOLUTION INTRAMUSCULAR; INTRAVENOUS; SUBCUTANEOUS at 01:28

## 2022-10-21 RX ADMIN — SODIUM CHLORIDE 1000 ML: 9 INJECTION, SOLUTION INTRAVENOUS at 00:26

## 2022-10-21 RX ADMIN — SODIUM CHLORIDE, POTASSIUM CHLORIDE, SODIUM LACTATE AND CALCIUM CHLORIDE: 600; 310; 30; 20 INJECTION, SOLUTION INTRAVENOUS at 17:55

## 2022-10-21 ASSESSMENT — ACTIVITIES OF DAILY LIVING (ADL)
ADLS_ACUITY_SCORE: 35
ADLS_ACUITY_SCORE: 35
ADLS_ACUITY_SCORE: 18
ADLS_ACUITY_SCORE: 35
ADLS_ACUITY_SCORE: 18
ADLS_ACUITY_SCORE: 35
ADLS_ACUITY_SCORE: 33
ADLS_ACUITY_SCORE: 18

## 2022-10-21 ASSESSMENT — ENCOUNTER SYMPTOMS
BLOOD IN STOOL: 1
VOMITING: 1
NAUSEA: 1
ABDOMINAL PAIN: 1
SHORTNESS OF BREATH: 0
FEVER: 0

## 2022-10-21 NOTE — PLAN OF CARE
"Goal Outcome Evaluation:                    Pt A/Ox4. Vitally stable. Minimal pain \"2\" decreased with heat pack, no meds given this shift.  Up ind in room.  Tolerating clear liquids. Started prednisone. Continue to monitor.    "

## 2022-10-21 NOTE — ED NOTES
"[facility]  ED Nurse Handoff Report    ED Chief complaint: Abdominal Pain      ED Diagnosis:   Final diagnoses:   None       Code Status: Admitting MD to establish with patient.    Allergies:   Allergies   Allergen Reactions    Other Environmental Allergy Itching and Visual Disturbance       Patient Story: Hx of crohns; upper abd pain started earlier today became worse and in past 3 hours has been having emesis.     Focused Assessment:  Pain intermittently worse; 6-10/10. VSS.     Treatments and/or interventions provided: IV, meds, labs, imaging    Patient's response to treatments and/or interventions: See MAR; see results.    To be done/followed up on inpatient unit:  See orders.    Does this patient have any cognitive concerns?:  N/A    Activity level - Baseline/Home:  Independent  Activity Level - Current:   Independent    Patient's Preferred language: English   Needed?: No    Isolation: None  Infection: Not Applicable  Patient tested for COVID 19 prior to admission: YES  Bariatric?: No    Vital Signs:   Vitals:    10/20/22 2350 10/21/22 0200 10/21/22 0230   BP: 124/76     Pulse: 95     Resp: 18     Temp: 97.5  F (36.4  C)     TempSrc: Oral     SpO2: 100% 96% 96%   Weight: 54.4 kg (120 lb)     Height: 1.626 m (5' 4\")         Cardiac Rhythm:     Was the PSS-3 completed:   Yes  What interventions are required if any?               Family Comments: Boyfriend at bedside  OBS brochure/video discussed/provided to patient/family: N/A              Name of person given brochure if not patient:               Relationship to patient:     For the majority of the shift this patient's behavior was Green.   No behavioral interventions performed.    ED NURSE PHONE NUMBER: *[phone]         "

## 2022-10-21 NOTE — PHARMACY-ADMISSION MEDICATION HISTORY
Pharmacy Medication History  Admission medication history interview status for the 10/21/2022  admission is complete. See EPIC admission navigator for prior to admission medications     Location of Interview: Patient room  Medication history sources: Patient and Surescripts    In the past week, patient estimated taking medication this percent of the time: greater than 90%    Medication reconciliation completed by provider prior to medication history? No    Time spent in this activity: 10 minutes    Prior to Admission medications    Medication Sig Last Dose Taking? Auth Provider Long Term End Date   ustekinumab (STELARA) 90 MG/ML Inject 1 ml ( 90 mg) subcutaneous every 6 weeks. 10/20/2022 Yes Shady Caceres PA-C     paragard intrauterine copper device 1 each by Intrauterine route once In place  Tahira Mcduffie, AASHISH MORLEYM         The information provided in this note is only as accurate as the sources available at the time of update(s)

## 2022-10-21 NOTE — ED TRIAGE NOTES
Hx of crohns, pain started earlier today became worse and in past 3 hours has been having emesis     Triage Assessment     Row Name 10/21/22 0006       Triage Assessment (Adult)    Airway WDL WDL       Respiratory WDL    Respiratory WDL WDL       Skin Circulation/Temperature WDL    Skin Circulation/Temperature WDL WDL       Peripheral/Neurovascular WDL    Peripheral Neurovascular WDL WDL       Cognitive/Neuro/Behavioral WDL    Cognitive/Neuro/Behavioral WDL WDL

## 2022-10-21 NOTE — PROGRESS NOTES
"SPIRITUAL HEALTH SERVICES Progress Note  [facility] [address]    Referral Source: Advance Directive Consult    Provided Pt Marybel with advance care planning information and materials and offered education regarding availability of notaries. At this point, Pt would like to \"look at the materials and think about them\" and will let staff know if she decides she would like to pursue notarization while hospitalized. No other needs at this time.    SHS remains available to Pt as needed/requested.     Kia Sky MDiv  Chaplain Resident  Pager: [phone]   "

## 2022-10-21 NOTE — H&P
[facility]    History and Physical  Hospitalist       Date of Admission:  10/21/2022  Date of Service (when I saw the patient): 10/21/22    ASSESSMENT  Marybel Lyon is a markedly pleasant 32 year old woman with past medical history that is most notable for Crohn's Disease, who presents with acute abdominal pain, nausea and vomiting, and is found to have suspected acute SBO due to Crohn's colitis.    PLAN    Suspected acute SBO due to Crohn's colitis: Ms. Lyon was diagnosed with Crohn's Disease in 2012, and used to be on 6MP. Her last flare of disease was in 2020, at which time she was hospitalized with a mechanical small bowel obstruction, which resolved with conservative measures. Since 2021 she has miguel on Stelara. She follows with [facility]. MR enterography in 1/2022 was notable for 10 cm loop of thickened ileum. She has not required any prior abdominal surgeries. She presents now for acute abdominal pain, nausea and vomiting. She is afebrile in the ED, with concomitant mild leukocytosis. X-ray shows asymmetric gas and fluid distended small bowel loops in the midabdomen, worrisome for mechanical obstruction. Her course seems already to be significantly improving in the ED with Zofran, Pepcid, and Dilaudid.     -- Inpatient. NPO. IV  ml/hour ordered. Tylenol, IV Dilaudid as needed for pain. Anti-emetics as needed.    -- She was given Prednisone 40 mg PO earlier in the ED tonight and it seems she has been able to keep it down. Further steroids (PO or IV) will be as per GI today.    -- If her course worsens we would consider NG tube placement, CT of abdomen/pelvis, and/or Surgical consultation    -- Enoxaparin (Lovenox) subcutaneous to be ordered for VTE chemoprophylaxis, once it is known no procedures are contemplated today    -- Repeat CBC, CMP ordered    Rule Out COVID-19 infection  This patient was evaluated during a global COVID-19 pandemic, which necessitated consideration  that the patient might be at risk for infection with the SARS-CoV-2 virus that causes COVID-19. Applicable protocols for evaluation were followed during the patient's care.   -negative COVID-19 PCR test result  -no current indication for precautions    Chief Complaint   Abdominal pain    History is obtained from the patient and the ED physician whom I have spoken with    History of Present Illness   Marybel Lyon is a markedly pleasant 32 year old woman who presents with abdominal pain, of sudden onset late this afternoon, constant since on set, worst in the epigastrium and left upper quadrant but at times radiating diffusely; rapidly escalating in severity overnight, leading to associated nausea with multiple episodes of vomiting, as well as abdominal distension and firmness; all unrelieved with Tylenol and efforts at hydration which she vomited up, so she came in for further evaluation. She was given Pepcid, Dilaudid and Zofran in the ED and these measures have significantly improved her symptoms. She says in particular that her abdominal distension is nearly resolved now, though she is not able to pass flatus. Her last bowel movement had been this morning before onset of the pain and had been loose and watery, with a small amount of blood on the toilet paper. She adds that her life at work and home has been very stressful in the past weeks and she is unsure if she has remained adequately hydrated during this time, and in the past dehydration and stress have been triggers for Crohn's flares. She has not had any recent flares since 2020, until this evening. She also endorses hot flashes and chills tonight, though she denies any other acute complaints.    In the ED,   10/20 2350 124/76 97.5  F (36.4  C) 95 18 100 %     CBC and CMP were notable for WBC 13.5, HGB 11.7, Glucose 159, otherwise were within the normal reference range. Lipase was 127. HCG was negative.    She was also given 40 mg Prednisone and IV  "fluid in the ED.    Recent Results (from the past 24 hour(s))   Abdomen XR, 2 vw, flat and upright    Narrative    EXAM: XR ABDOMEN 2 VIEWS  LOCATION: [facility]  DATE/TIME: 10/21/2022 1:59 AM    INDICATION: Crohn's disease. Abdominal pain. History of small bowel obstruction.  COMPARISON: MR abdomen enterography without/with IV contrast 1/17/2022.      Impression    IMPRESSION: Asymmetric gas and fluid distended small bowel loops in the midabdomen, worrisome for mechanical obstruction. No free gas. No opaque urinary tract calculi. Minor right convex thoracolumbar curve. IUCD.       PHYSICAL EXAM  Blood pressure 124/76, pulse 95, temperature 97.5  F (36.4  C), temperature source Oral, resp. rate 18, height 1.626 m (5' 4\"), weight 54.4 kg (120 lb), SpO2 96 %, not currently breastfeeding.  Constitutional: Alert and oriented to person, place and time; no apparent distress  HEENT: normocephalic moist mucus membranes  Respiratory: lungs clear to auscultation bilaterally  Cardiovascular: regular S1 S2  GI: abdomen soft currently, with minimal voluntary guarding; diffusely mildly tender and mildly distended bowel sounds are loud in the LUQ and absent elsewhere  Lymph/Hematologic: pale, no cervical lymphadenopathy  Skin: no rash, good turgor  Musculoskeletal: no clubbing, cyanosis or edema  Neurologic: extra-ocular muscles intact; moves all four extremities  Psychiatric: appropriate affect, insight and judgment     DVT Prophylaxis: Enoxaparin (Lovenox) subcutaneous to be ordered once it is known no surgeries are contemplated today    Code Status: Full Code    Disposition: Expected discharge in 2-4 days    Eric Bowen MD, MD    Past Medical History    I have reviewed this patient's medical history and updated it with pertinent information if needed.   Past Medical History:   Diagnosis Date     Abnormal Pap smear of cervix 2016     Autoimmune disease (H) 2012    Crohns     Crohn's disease " (H) 2021     Hypertension 2020    Unsure it previously caused by steroid meds     SBO (small bowel obstruction) (H)      Uncomplicated asthma     Used inhaler in childhood, no issues as adult       Past Surgical History   I have reviewed this patient's surgical history and updated it with pertinent information if needed.  Past Surgical History:   Procedure Laterality Date     COLONOSCOPY       EGD       NO HISTORY OF SURGERY       MD ABLATE HEART DYSRHYTHM FOCUS         Prior to Admission Medications   Prior to Admission Medications   Prescriptions Last Dose Informant Patient Reported? Taking?   budesonide (ENTOCORT EC) 3 MG EC capsule   No No   Sig: Take 3 capsules (9 mg) by mouth every morning for 30 days   paragard intrauterine copper device   Yes No   Si each by Intrauterine route once   ustekinumab (STELARA) 90 MG/ML   No No   Sig: Inject 1 ml ( 90 mg) subcutaneous every 6 weeks.      Facility-Administered Medications: None     Allergies   Allergies   Allergen Reactions     Other Environmental Allergy Itching and Visual Disturbance       Social History   I have reviewed this patient's social history and updated it with pertinent information if needed. Marybel ALEKSANDR Lyon  reports that she has never smoked. She has never used smokeless tobacco. She reports current alcohol use. She reports that she does not use drugs.    Family History   Family history assessed and, except as above, is non-contributory.    Family History   Problem Relation Age of Onset     Other - See Comments Mother         Hysterectomy     Hypertension Father      Cerebrovascular Disease Maternal Grandmother 50     Colon Cancer Maternal Grandfather         60s     Melanoma Maternal Grandfather      Diabetes Type 1 Paternal Grandmother      Other - See Comments Paternal Grandmother         uterine prolapse     Heart Disease Paternal Grandfather 50        Heart attack     Hypertension Cousin        Review of Systems   The 10  point Review of Systems is negative other than noted in the HPI or here.     Primary Care Physician   Physician No Ref-Primary    Data   Labs Ordered and Resulted from Time of ED Arrival to Time of ED Departure   COMPREHENSIVE METABOLIC PANEL - Abnormal       Result Value    Sodium 141      Potassium 3.6      Chloride 106      Carbon Dioxide (CO2) 26      Anion Gap 9      Urea Nitrogen 9      Creatinine 0.64      Calcium 9.0      Glucose 159 (*)     Alkaline Phosphatase 40      AST 24      ALT 27      Protein Total 6.9      Albumin 3.7      Bilirubin Total 0.4      GFR Estimate >90     CBC WITH PLATELETS AND DIFFERENTIAL - Abnormal    WBC Count 13.5 (*)     RBC Count 4.39      Hemoglobin 11.7      Hematocrit 35.9      MCV 82      MCH 26.7      MCHC 32.6      RDW 13.3      Platelet Count 299      % Neutrophils 86      % Lymphocytes 7      % Monocytes 6      % Eosinophils 1      % Basophils 0      % Immature Granulocytes 0      NRBCs per 100 WBC 0      Absolute Neutrophils 11.7 (*)     Absolute Lymphocytes 0.9      Absolute Monocytes 0.7      Absolute Eosinophils 0.1      Absolute Basophils 0.0      Absolute Immature Granulocytes 0.1      Absolute NRBCs 0.0     LIPASE - Normal    Lipase 127     HCG QUALITATIVE PREGNANCY - Normal    hCG Serum Qualitative Negative     COVID-19 VIRUS (CORONAVIRUS) BY PCR       Data reviewed today:  I personally reviewed the abdominal x-ray image(s) showing SBO.

## 2022-10-21 NOTE — ED PROVIDER NOTES
History   Chief Complaint:  Abdominal Pain       The history is provided by the patient.      Marybel Lyon is a 32 year old female with history of Crohn's disease and small bowel obstruction who presents with worsening abdominal pain, consistent with a Crohn's disease flare up, with associated nausea and vomiting today. She states the pain began mid afternoon today and has increased since approximately 7 hours ago. She has been having bowel movements with decreased stool output and notes one episode of hematochezia earlier today. She states her Crohn's flare ups are typically managed with water and Tylenol, which she took today, but has not been relieved this time. She takes Stelara every 6 weeks and had a dose today. Her last hospitalization due to Crohn's was 2 years ago, but she is unable to recall what she was given to resolve her symptoms. She does follow up with a gastroenterologist.     Review of Systems   Constitutional: Negative for fever.   Respiratory: Negative for shortness of breath.    Cardiovascular: Negative for chest pain.   Gastrointestinal: Positive for abdominal pain, blood in stool, nausea and vomiting.   All other systems reviewed and are negative.        Allergies:  Other Environmental Allergy    Medications:  Budesonide  IUD  Stelara    Past Medical History:     Crohn's disease  ASCUS  Paroxysmal supraventricular tachycardia  Small bowel obstruction     Past Surgical History:    Colonoscopy x3  Ablate heart dysrhythmia  EGD    Family History:    Hysterectomy  Hypertension  Cerebrovascular disease  Colon cancer  Melanoma  Type 1 diabetes  Uterine prolapse  Heart attack    Social History:  The patient presents with her boyfriend.  The patient presents in a private vehicle.    Physical Exam     Patient Vitals for the past 24 hrs:   BP Temp Temp src Pulse Resp SpO2 Height Weight   10/21/22 0230 -- -- -- -- -- 96 % -- --   10/21/22 0200 -- -- -- -- -- 96 % -- --   10/20/22 2350 124/76 97.5  " F (36.4  C) Oral 95 18 100 % 1.626 m (5' 4\") 54.4 kg (120 lb)       Physical Exam  General: Appears well-developed and well-nourished.   Head: No signs of trauma.   CV: Normal rate and regular rhythm.    Resp: Effort normal and breath sounds normal. No respiratory distress.   GI: Soft. There is mild tenderness.  No rebound or guarding.  Normal bowel sounds.  No CVA tenderness.  MSK: Normal range of motion. .  Neuro: The patient is alert and oriented. Speech normal.  Skin: Skin is warm and dry. No rash noted.   Psych: normal mood and affect. behavior is normal.       Emergency Department Course     Imaging:  Abdomen XR, 2 vw, flat and upright   Final Result   IMPRESSION: Asymmetric gas and fluid distended small bowel loops in the midabdomen, worrisome for mechanical obstruction. No free gas. No opaque urinary tract calculi. Minor right convex thoracolumbar curve. IUCD.        Report per radiology    Laboratory:  Labs Ordered and Resulted from Time of ED Arrival to Time of ED Departure   COMPREHENSIVE METABOLIC PANEL - Abnormal       Result Value    Sodium 141      Potassium 3.6      Chloride 106      Carbon Dioxide (CO2) 26      Anion Gap 9      Urea Nitrogen 9      Creatinine 0.64      Calcium 9.0      Glucose 159 (*)     Alkaline Phosphatase 40      AST 24      ALT 27      Protein Total 6.9      Albumin 3.7      Bilirubin Total 0.4      GFR Estimate >90     CBC WITH PLATELETS AND DIFFERENTIAL - Abnormal    WBC Count 13.5 (*)     RBC Count 4.39      Hemoglobin 11.7      Hematocrit 35.9      MCV 82      MCH 26.7      MCHC 32.6      RDW 13.3      Platelet Count 299      % Neutrophils 86      % Lymphocytes 7      % Monocytes 6      % Eosinophils 1      % Basophils 0      % Immature Granulocytes 0      NRBCs per 100 WBC 0      Absolute Neutrophils 11.7 (*)     Absolute Lymphocytes 0.9      Absolute Monocytes 0.7      Absolute Eosinophils 0.1      Absolute Basophils 0.0      Absolute Immature Granulocytes 0.1      " Absolute NRBCs 0.0     LIPASE - Normal    Lipase 127     HCG QUALITATIVE PREGNANCY - Normal    hCG Serum Qualitative Negative     COVID-19 VIRUS (CORONAVIRUS) BY PCR        Emergency Department Course:    Reviewed:  I reviewed nursing notes, vitals, past medical history and Care Everywhere    Assessments:  0110 I obtained history and examined the patient as noted above.   0241 I rechecked the patient and explained findings.     Consults:  0259 I consulted with Dr. Bowen, hospitalist, regarding the patient's history and presentation here in the emergency department who accepted the patient for admission.     Interventions:  0024 Ondansetron 4 mg IV  0026 NS 1 L IV  0127 Prednisone 40 mg PO  0128 Dilaudid 0.5 mg IV  0129 Ondansetron 4 mg IV  0129 Famotidine 20 mg IV  0225 NS 1 L IV  0343 Dilaudid 0.5 mg IV    Disposition:  The patient was admitted to the hospital under the care of Dr. Bowen.     Impression & Plan     Medical Decision Making:  Marybel Lyon is a 32-year-old woman who presents with abdominal pain and nausea and vomiting.  She has Crohn's disease and states that this afternoon her symptoms felt like her Crohn's.  She tried taking Tylenol and fluids which typically helps her, but when the symptoms continue to worsen she came to the hospital.  On my evaluation she had an overall benign abdominal exam.  Blood work was obtained that showed slight elevation of her white blood cell count but was otherwise appropriate.  On chart review she does have a history of bowel obstruction in the past, so I did obtain abdominal x-ray which did demonstrate concerns for bowel obstruction.  Patient was given IV fluids along with pain and nausea medication and prednisone for the Crohn's.  She did have symptomatic improvement with this.  She was admitted to the hospitalist service for continued hydration and monitoring.    Diagnosis:    ICD-10-CM    1. SBO (small bowel obstruction) (H)  K56.609       2. Crohn's disease  of both small and large intestine without complication (H)  K50.80           Scribe Disclosure:  I, Elizabeth Lang, am serving as a scribe at 1:10 AM on 10/21/2022 to document services personally performed by Edmund Jorgensen MD based on my observations and the provider's statements to me.          Edmund Jorgensen MD  10/21/22 050

## 2022-10-21 NOTE — CONSULTS
Gastroenterology Consult Note    Marybel Lyon MRN#  [id number]   Age:  32 year old YOB: 1990    Date of Admission:  10/21/2022     CHIEF COMPLAINT   Crohn's ileitis with small bowel obstruction     HISTORY OF PRESENT ILLNESS   32 year old CF with Crohn's ileitis is seen in GI consultation today for management of small bowel obstruction at the request of Shukri Ceballos MD.    Patient with long-standing history of Crohn's disease diagnosed at age 13.  Extent of disease includes ileum and jejunum.  Patient currently managed with Stelara since 05/2021.  Prior treatment exposure include 6-MP 50 mg once daily.  Last colonoscopy in 01/2021 showing mild disease with small ulcers in terminal ileum without any significant stricturing disease.  Last MR enterography in 01/2022 notable for moderate segmental length of ileum with diffuse wall thickening consistent with acute on chronic inflammation.    Patient reports overall doing well until an acute onset of abdominal pain that began yesterday.  Described as diffuse severe dull aching pain more prominent over upper abdomen.  Associated nausea with multiple episodes of emesis consisting of previously eaten food.  No preceding or recent episodes of worsening heartburn, dysphagia, or odynophagia.  Previously at baseline, bowel movements once or twice a day of normal appearing stools.  She notes two episodes of minimal amount of hard consistency stools prior to arrival to emergency room.  Possible speck of blood on wiping, but denies any sharifa hematochezia or melena.  No fever, chills, chest pain, dyspnea, or dizziness.    On arrival, patient noted to have stable vitals.  Blood work notable for mild leukocytosis at WBC 13.5 with stable hemoglobin, serum Cr, and liver chemistry.  CRP normal.  Abdominal x-ray notable for asymmetric gas and fluid distended small bowel loops in mid abdomen, worrisome for mechanical obstruction.  Patient was given a dose  of Prednisone, hydromorphone, and IVF hydration in emergency room.  She remains NPO overnight.  This morning, patient reports near complete resolution of her abdominal pain.  No further episodes of nausea or emesis.  However, denies return of bowel function with no flatus or stools since admission.  Feels her abdominal distention has improved significantly and would like to resume diet.       PAST HISTORY      Past Medical History:   Diagnosis Date     Abnormal Pap smear of cervix 2016     Autoimmune disease (H) 2012    Crohns     Crohn's disease (H) 04/08/2021     Hypertension 12/2020    Unsure it previously caused by steroid meds     SBO (small bowel obstruction) (H) 2020     Uncomplicated asthma     Used inhaler in childhood, no issues as adult      Past Surgical History:   Procedure Laterality Date     COLONOSCOPY       EGD       NO HISTORY OF SURGERY       [place] ABLATE HEART DYSRHYTHM FOCUS  1993      Family History Social History   Family History   Problem Relation Age of Onset     Other - See Comments Mother         Hysterectomy     Hypertension Father      Cerebrovascular Disease Maternal Grandmother 50     Colon Cancer Maternal Grandfather         60s     Melanoma Maternal Grandfather      Diabetes Type 1 Paternal Grandmother      Other - See Comments Paternal Grandmother         uterine prolapse     Heart Disease Paternal Grandfather 50        Heart attack     Hypertension Cousin     Social History     Socioeconomic History     Marital status: Single     Spouse name: Not on file     Number of children: Not on file     Years of education: Not on file     Highest education level: Not on file   Occupational History     Not on file   Tobacco Use     Smoking status: Never     Smokeless tobacco: Never   Substance and Sexual Activity     Alcohol use: Yes     Drug use: Never     Sexual activity: Yes     Partners: Male     Birth control/protection: Condom, None   Other Topics Concern     Parent/sibling w/ CABG, MI or  "angioplasty before 65F 55M? No   Social History Narrative     Not on file     Social Determinants of Health     Financial Resource Strain: Not on file   Food Insecurity: Not on file   Transportation Needs: Not on file   Physical Activity: Not on file   Stress: Not on file   Social Connections: Not on file   Intimate Partner Violence: Not on file   Housing Stability: Not on file         MEDICATIONS & ALLERGIES   Medications Prior to Admission   Medication Sig Dispense Refill Last Dose     ustekinumab (STELARA) 90 MG/ML Inject 1 ml ( 90 mg) subcutaneous every 6 weeks. 1 mL 5 10/20/2022     paragard intrauterine copper device 1 each by Intrauterine route once   In place      Allergies   Allergen Reactions     Other Environmental Allergy Itching and Visual Disturbance        REVIEW OF SYSTEMS   Comprehensive 10+ points review of systems performed with pertinent as per HPI above.    OBJECTIVE   Vitals /78 (BP Location: Left arm)   Pulse 112   Temp 98  F (36.7  C) (Oral)   Resp 18   Ht 1.626 m (5' 4\")   Wt 54.4 kg (120 lb)   SpO2 94%   BMI 20.60 kg/m          General:   Well-developed young CF in NAD.   HEENT:   NC/AT. MMM.   Lungs:  No respiratory distress.   Heart:  RRR. +S1, S2.    Abdomen:   Soft. NT/ND. BS hypoactive.    Ext/MS:   No cyanosis or erythema.    Neuro:   No focal deficits noted.    Psych:  Appears to have normal affect and mood.   Skin:  No obvious rashes or lesions noted.         LABORATORY AND IMAGING    ELECTROLYTE PANEL   Recent Labs   Lab Test 10/21/22  0012   POTASSIUM 3.6   BUN 9      HEMATOLOGY PANEL   Recent Labs   Lab Test 10/21/22  0012 10/19/22  0839 03/07/22  1354   WBC 13.5* 5.1 7.2   MCV 82 83 87      LIVER AND PANCREAS PANEL   Recent Labs   Lab Test 10/21/22  0012 10/19/22  0839 04/15/21  0842   ALBUMIN 3.7 4.1 3.1*   LIPASE 127  --   --       I have reviewed the current diagnostic and laboratory tests.     Assessment / Recommendations:     Assessment:  1. Small bowel " obstruction.  Likely due to small bowel stricturing from known Crohn's ileitis.  While there is mild leukocytosis, CRP is otherwise unremarkable.  Question inflammatory vs fibrotic stricture.  Fortunately, patient has had significant and near-complete resolution of her acute symptoms with one dose of Prednisone and Hydromorphone in emergency room.  Bowel function has not returned yet.  Discussed likely etiology of her symptoms with patient.  While presentation is equivocal for an acute Crohn's flare up, given ongoing symptomatic improvement would continue short course of Prednisone taper.  2. Crohn's disease.  On maintenance therapy with Stelara.  Followed by Dr. Jones at [facility].    Recommendations:  1. Short-course of Prednisone taper.  Start at 40 mg once daily with taper down by 10 mg every week.  2. Start clear liquid diet today.  If tolerates well, would consider advancing diet as tolerated tomorrow.  3. If patient fails to tolerate diet or redevelopment of nausea and emesis, would place NG tube to intermittent suction.  4. Discussed potential utility of cross-sectional imaging with CT.  Given patient's concern for radiation exposure and ongoing clinical improvement, agree with deferring at this time.  5. Outpatient follow up with Dr. Jones at [facility] GI clinic on discharge.  6. Cautious use of narcotic analgesia, given concern for mechanical small bowel obstruction and no return of bowel function yet.       Total time spent in chart review, direct medical discussion, examination, and documentation was 40 minutes.    Shawn Lechuga MD  [facility]  [phone]  I appreciate the opportunity to participate in the care of this patient.   Please feel free to call me with any questions or concerns.

## 2022-10-21 NOTE — PROVIDER NOTIFICATION
Patient is a 32yoF who follows at [facility] for chron's disease. She presents with abdominal pain, nausea and vomiting. In the ED, AXR demonstrated air fluid levels and distention worrisome for mechanical obstruction. She was treated conservatively with zofran, and pain meds. She did receive steroids.    Labs are notable for mild elevation of WBC to 13.5, crp <2.9 and ESR 11.    Since arrival to floor she notes significant improvement in SBO symptoms. She has not had return of bowel function. She was started on a prednisone taper by GI team. Further imaging was deferred as she is clinically improving. Her diet has been advance to clears. At this time, discharge is pending return of bowel function and advancement of diet.    Shukri Ceballos MD

## 2022-10-22 LAB
ALBUMIN SERPL-MCNC: 2.8 G/DL (ref 3.4–5)
ALP SERPL-CCNC: 30 U/L (ref 40–150)
ALT SERPL W P-5'-P-CCNC: 19 U/L (ref 0–50)
ANION GAP SERPL CALCULATED.3IONS-SCNC: 4 MMOL/L (ref 3–14)
AST SERPL W P-5'-P-CCNC: 13 U/L (ref 0–45)
BASOPHILS # BLD AUTO: 0 10E3/UL (ref 0–0.2)
BASOPHILS NFR BLD AUTO: 0 %
BILIRUB SERPL-MCNC: 0.4 MG/DL (ref 0.2–1.3)
BUN SERPL-MCNC: 9 MG/DL (ref 7–30)
CALCIUM SERPL-MCNC: 8.3 MG/DL (ref 8.5–10.1)
CHLORIDE BLD-SCNC: 109 MMOL/L (ref 94–109)
CO2 SERPL-SCNC: 25 MMOL/L (ref 20–32)
CREAT SERPL-MCNC: 0.47 MG/DL (ref 0.52–1.04)
EOSINOPHIL # BLD AUTO: 0 10E3/UL (ref 0–0.7)
EOSINOPHIL NFR BLD AUTO: 0 %
ERYTHROCYTE [DISTWIDTH] IN BLOOD BY AUTOMATED COUNT: 13.8 % (ref 10–15)
GFR SERPL CREATININE-BSD FRML MDRD: >90 ML/MIN/1.73M2
GLUCOSE BLD-MCNC: 92 MG/DL (ref 70–99)
HCT VFR BLD AUTO: 30.6 % (ref 35–47)
HGB BLD-MCNC: 10 G/DL (ref 11.7–15.7)
IMM GRANULOCYTES # BLD: 0 10E3/UL
IMM GRANULOCYTES NFR BLD: 0 %
LYMPHOCYTES # BLD AUTO: 0.9 10E3/UL (ref 0.8–5.3)
LYMPHOCYTES NFR BLD AUTO: 10 %
MCH RBC QN AUTO: 26.3 PG (ref 26.5–33)
MCHC RBC AUTO-ENTMCNC: 32.7 G/DL (ref 31.5–36.5)
MCV RBC AUTO: 81 FL (ref 78–100)
MONOCYTES # BLD AUTO: 1.1 10E3/UL (ref 0–1.3)
MONOCYTES NFR BLD AUTO: 13 %
NEUTROPHILS # BLD AUTO: 6.5 10E3/UL (ref 1.6–8.3)
NEUTROPHILS NFR BLD AUTO: 77 %
NRBC # BLD AUTO: 0 10E3/UL
NRBC BLD AUTO-RTO: 0 /100
PLATELET # BLD AUTO: 269 10E3/UL (ref 150–450)
POTASSIUM BLD-SCNC: 3.5 MMOL/L (ref 3.4–5.3)
PROT SERPL-MCNC: 5.5 G/DL (ref 6.8–8.8)
RBC # BLD AUTO: 3.8 10E6/UL (ref 3.8–5.2)
SODIUM SERPL-SCNC: 138 MMOL/L (ref 133–144)
WBC # BLD AUTO: 8.6 10E3/UL (ref 4–11)

## 2022-10-22 PROCEDURE — 36415 COLL VENOUS BLD VENIPUNCTURE: CPT | Performed by: HOSPITALIST

## 2022-10-22 PROCEDURE — 99232 SBSQ HOSP IP/OBS MODERATE 35: CPT | Performed by: INTERNAL MEDICINE

## 2022-10-22 PROCEDURE — 82040 ASSAY OF SERUM ALBUMIN: CPT | Performed by: HOSPITALIST

## 2022-10-22 PROCEDURE — 250N000012 HC RX MED GY IP 250 OP 636 PS 637: Performed by: INTERNAL MEDICINE

## 2022-10-22 PROCEDURE — 250N000013 HC RX MED GY IP 250 OP 250 PS 637: Performed by: HOSPITALIST

## 2022-10-22 PROCEDURE — 120N000001 HC R&B MED SURG/OB

## 2022-10-22 PROCEDURE — 258N000003 HC RX IP 258 OP 636: Performed by: HOSPITALIST

## 2022-10-22 PROCEDURE — 80053 COMPREHEN METABOLIC PANEL: CPT | Performed by: HOSPITALIST

## 2022-10-22 PROCEDURE — 85025 COMPLETE CBC W/AUTO DIFF WBC: CPT | Performed by: HOSPITALIST

## 2022-10-22 RX ADMIN — SODIUM CHLORIDE, POTASSIUM CHLORIDE, SODIUM LACTATE AND CALCIUM CHLORIDE: 600; 310; 30; 20 INJECTION, SOLUTION INTRAVENOUS at 01:56

## 2022-10-22 RX ADMIN — ACETAMINOPHEN 650 MG: 325 TABLET, FILM COATED ORAL at 17:35

## 2022-10-22 RX ADMIN — PREDNISONE 40 MG: 20 TABLET ORAL at 08:57

## 2022-10-22 RX ADMIN — SODIUM CHLORIDE, POTASSIUM CHLORIDE, SODIUM LACTATE AND CALCIUM CHLORIDE: 600; 310; 30; 20 INJECTION, SOLUTION INTRAVENOUS at 09:05

## 2022-10-22 RX ADMIN — ACETAMINOPHEN 650 MG: 325 TABLET, FILM COATED ORAL at 23:34

## 2022-10-22 RX ADMIN — ACETAMINOPHEN 650 MG: 325 TABLET, FILM COATED ORAL at 10:56

## 2022-10-22 ASSESSMENT — ACTIVITIES OF DAILY LIVING (ADL)
ADLS_ACUITY_SCORE: 18

## 2022-10-22 NOTE — PLAN OF CARE
Pt is A&Ox4. VSS on RA. C/o feeling constipated, declined medication interventions. Using heat packs, effective. Continent of B&B. X1 tiny, hard BM. Passing gas. BS hypoactive. Abdomen soft. Denies N/V. On clear liquid, tolerating well. PIV infusing LR at 125mL/hr. GI following. Discharge pending improvement.

## 2022-10-22 NOTE — PLAN OF CARE
Primary Diagnosis: Crohn's related SBO  Orientation: A&Ox4  Aggression Stop Light: green  Mobility: Independent   Pain Management: tylenol given prn for a headache  Diet: clears, tolerated well  Bowel/Bladder: no BM but is passing gas, hypoactive BS, voiding well  Abnormal Lab/Assessments: WBC 13.5  Drain/Device/Wound: PIV LR 125ml/hr  Consults: GI  D/C Day/Goals/Place: pending diet tolerance and bowel activity

## 2022-10-22 NOTE — PROGRESS NOTES
[facility]    Hospitalist Progress Note    Date of Service (when I saw the patient): 10/22/2022    Assessment & Plan   Marybel Lyon is a 32 year old female who was admitted on 10/21/2022.    Marybel Lyon is a markedly pleasant 32 year old woman with past medical history that is most notable for Crohn's Disease, who presents with acute abdominal pain, nausea and vomiting, and is found to have suspected acute SBO due to Crohn's colitis.     PLAN     Suspected acute SBO due to Crohn's colitis: Ms. Lyon was diagnosed with Crohn's Disease in 2012, and used to be on 6MP. Her last flare of disease was in 2020, at which time she was hospitalized with a mechanical small bowel obstruction, which resolved with conservative measures. Since 2021 she has miguel on Stelara. She follows with [facility]. MR enterography in 1/2022 was notable for 10 cm loop of thickened ileum. She has not required any prior abdominal surgeries. She presents now for acute abdominal pain, nausea and vomiting. She is afebrile in the ED, with concomitant mild leukocytosis. X-ray shows asymmetric gas and fluid distended small bowel loops in the midabdomen, worrisome for mechanical obstruction. Her course seems already to be significantly improving in the ED with Zofran, Pepcid, and Dilaudid.     -- Inpatient. NPO. IV  ml/hour ordered. Tylenol, IV Dilaudid as needed for pain. Anti-emetics as needed.    -- She was given Prednisone 40 mg PO earlier in the ED tonight and it seems she has been able to keep it down.   [facility] consulted and are following  Patient was started on prednisone and taper with come down by 10 mg every week ordered  Patient is tolerating clear liquid diet currently  Had a small BM earlier today  We will advance her diet to full liquids today  Reduce IV fluids to 50 mill per hour  Most likely will advance her diet to low fiber diet tomorrow and plan on discharge to home tomorrow if okay with  [facility]    Rule Out COVID-19 infection  This patient was evaluated during a global COVID-19 pandemic, which necessitated consideration that the patient might be at risk for infection with the SARS-CoV-2 virus that causes COVID-19. Applicable protocols for evaluation were followed during the patient's care.   -negative COVID-19 PCR test result  -no current indication for precautions       DVT Prophylaxis: Pneumatic Compression Devices and ambulation  Code Status: Full Code    Disposition: Expected discharge most likely tomorrow if able to tolerate diet well    Discussed with bedside RN and patient today    Danita Gibson MD, MD  [phone] (P)      Interval History   Patient is resting comfortably in bed.  Tolerating clear liquid diet.  Abdominal pain much improved had a small bowel movement earlier today.  Diet advanced to full liquids today    -Data reviewed today: I reviewed all new labs and imaging results over the last 24 hours. I personally reviewed no images or EKG's today.    Physical Exam   Temp: 98.2  F (36.8  C) Temp src: Oral BP: (!) 129/90 Pulse: 104   Resp: 18 SpO2: 95 % O2 Device: None (Room air)    Vitals:    10/20/22 2350 10/22/22 0644   Weight: 54.4 kg (120 lb) 57.6 kg (127 lb)     Vital Signs with Ranges  Temp:  [97.9  F (36.6  C)-98.2  F (36.8  C)] 98.2  F (36.8  C)  Pulse:  [] 104  Resp:  [18] 18  BP: (114-129)/(77-90) 129/90  SpO2:  [95 %] 95 %  I/O last 3 completed shifts:  In: 2228 [P.O.:240; I.V.:1988]  Out: -     Constitutional: Awake, alert, cooperative, no apparent distress  Respiratory: Clear to auscultation bilaterally, no crackles or wheezing  Cardiovascular: Regular rate and rhythm, normal S1 and S2, and no murmur noted  GI: Normal bowel sounds, soft, non-distended, non-tender  Skin/Integumen: No rashes, no cyanosis, no edema  Other:     Medications     lactated ringers 50 mL/hr at 10/22/22 1052       predniSONE  40 mg Oral Daily    Followed by     [START ON 10/27/2022]  predniSONE  30 mg Oral Daily    Followed by     [START ON 11/3/2022] predniSONE  20 mg Oral Daily    Followed by     [START ON 11/10/2022] predniSONE  10 mg Oral Daily     sodium chloride (PF)  3 mL Intracatheter Q8H       Data   Recent Labs   Lab 10/22/22  0731 10/21/22  0012 10/19/22  0839   WBC 8.6 13.5* 5.1   HGB 10.0* 11.7 11.5*   MCV 81 82 83    299 307    141  --    POTASSIUM 3.5 3.6  --    CHLORIDE 109 106  --    CO2 25 26  --    BUN 9 9  --    CR 0.47* 0.64  --    ANIONGAP 4 9  --    DALE 8.3* 9.0  --    GLC 92 159*  --    ALBUMIN 2.8* 3.7 4.1   PROTTOTAL 5.5* 6.9 6.4   BILITOTAL 0.4 0.4 0.4   ALKPHOS 30* 40 38   ALT 19 27 15   AST 13 24 21   LIPASE  --  127  --        No results found for this or any previous visit (from the past 24 hour(s)).

## 2022-10-23 VITALS
OXYGEN SATURATION: 94 % | HEIGHT: 64 IN | BODY MASS INDEX: 22.58 KG/M2 | SYSTOLIC BLOOD PRESSURE: 126 MMHG | DIASTOLIC BLOOD PRESSURE: 87 MMHG | RESPIRATION RATE: 16 BRPM | HEART RATE: 85 BPM | WEIGHT: 132.28 LBS | TEMPERATURE: 98.6 F

## 2022-10-23 PROCEDURE — 258N000003 HC RX IP 258 OP 636: Performed by: INTERNAL MEDICINE

## 2022-10-23 PROCEDURE — 250N000012 HC RX MED GY IP 250 OP 636 PS 637: Performed by: INTERNAL MEDICINE

## 2022-10-23 PROCEDURE — 99238 HOSP IP/OBS DSCHRG MGMT 30/<: CPT | Performed by: INTERNAL MEDICINE

## 2022-10-23 RX ORDER — PREDNISONE 20 MG/1
40 TABLET ORAL DAILY
Qty: 90 TABLET | Refills: 0 | Status: SHIPPED | OUTPATIENT
Start: 2022-10-23 | End: 2022-12-01

## 2022-10-23 RX ADMIN — PREDNISONE 40 MG: 20 TABLET ORAL at 09:37

## 2022-10-23 RX ADMIN — SODIUM CHLORIDE, POTASSIUM CHLORIDE, SODIUM LACTATE AND CALCIUM CHLORIDE: 600; 310; 30; 20 INJECTION, SOLUTION INTRAVENOUS at 02:37

## 2022-10-23 ASSESSMENT — ACTIVITIES OF DAILY LIVING (ADL)
ADLS_ACUITY_SCORE: 18

## 2022-10-23 NOTE — PLAN OF CARE
Discharge Note    Patient discharged to home via private vehicle  accompanied by significant other .  IV: Discontinued  Prescriptions filled and given to patient/family.   Belongings reviewed and sent with patient.   Home medications returned to patient: NA  Equipment sent with: patient, N/A.   patient verbalizes understanding of discharge instructions. AVS given to patient.

## 2022-10-23 NOTE — PLAN OF CARE
1900 - 4230    A&Ox4. VSS on RA. C/o 2/10 lower abdominal pain, managed with PRN tylenol and heat packs. Denies nausea. Up independent. Full liquid diet. Continent of B/B, had 4 loose BMs overnight, passing more gas. Pt stated less abd pain after BMs. R PIV dressing changed and infusing LR @ 50 mL/hr. GI following. Discharge pending improvement.

## 2022-10-23 NOTE — PROGRESS NOTES
"    Gastroenterology Follow Up Note    Marybel Lyon MRN#  [id number]   Age:  32 year old YOB: 1990    Date of Admission:  10/21/2022     Subjective   No acute events overnight.  Patient reports abdominal pain markedly improved today.  No episodes of nausea or emesis.  Tolerated full liquid diet.  A few bowel movements with minimal amount of hard consistency stools.     MEDICATIONS & ALLERGIES   Current medication list reviewed.      Allergies   Allergen Reactions     Other Environmental Allergy Itching and Visual Disturbance          OBJECTIVE   Vitals BP (!) 137/92 (BP Location: Left arm)   Pulse 81   Temp 98.3  F (36.8  C) (Oral)   Resp 18   Ht 1.626 m (5' 4\")   Wt 57.6 kg (127 lb)   SpO2 94%   BMI 21.80 kg/m          General:   Well-developed young CF in NAD.   HEENT:   NC/AT. MMM.   Lungs:  No respiratory distress.   Heart:  RRR. +S1, S2.    Abdomen:   Soft. NT/ND. BS hypoactive.    Ext/MS:   No cyanosis or erythema.    Neuro:   No focal deficits noted.    Psych:  Appears to have normal affect and mood.   Skin:  No obvious rashes or lesions noted.         LABORATORY AND IMAGING    ELECTROLYTE PANEL   Recent Labs   Lab Test 10/22/22  0731 10/21/22  0012   POTASSIUM 3.5 3.6   BUN 9 9      HEMATOLOGY PANEL   Recent Labs   Lab Test 10/22/22  0731 10/21/22  0012 10/19/22  0839   WBC 8.6 13.5* 5.1   MCV 81 82 83      LIVER AND PANCREAS PANEL   Recent Labs   Lab Test 10/22/22  0731 10/21/22  0012 10/19/22  0839   ALBUMIN 2.8* 3.7 4.1   LIPASE  --  127  --       I have reviewed the current diagnostic and laboratory tests.     Assessment / Recommendations:     Assessment:  1. Small bowel obstruction.  Likely due to small bowel stricturing from known Crohn's ileitis.  Question inflammatory vs fibrotic stricture.  Patient with near-complete resolution of her acute symptoms with initiation of steroid therapy.  A few bowel movements today with hard consistency stools.  While presentation is " equivocal for an acute Crohn's flare up, given ongoing symptomatic improvement would continue short course of Prednisone taper.  2. Crohn's disease.  On maintenance therapy with Stelara.  Followed by Dr. Jones at [facility].    Recommendations:  1. Complete short-course of Prednisone taper.    2. Continue to advance diet as tolerated.  3. If patient fails to tolerate regular diet or redevelopment of nausea and emesis, can consider NG tube placement to intermittent suction.  4. Discussed potential utility of cross-sectional imaging with CT.  Given patient's concern for radiation exposure and ongoing clinical improvement, agree with deferring at this time.  5. Outpatient follow up with Dr. Jones at [facility] GI clinic on discharge.  6. Cautious use of narcotic analgesia, given concern for mechanical small bowel obstruction and no return of bowel function yet.       Total time spent in chart review, direct medical discussion, examination, and documentation was 20 minutes.    Shawn Lechuga MD  [facility]  [phone]  I appreciate the opportunity to participate in the care of this patient.   Please feel free to call me with any questions or concerns.

## 2022-10-23 NOTE — DISCHARGE SUMMARY
[facility]  Discharge Summary        Marybel Lyon MRN# [id number]   YOB: 1990 Age: 32 year old     Date of Admission:  10/21/2022  Date of Discharge:  10/23/2022  Admitting Physician:  No admitting provider for patient encounter.  Discharge Physician: Danita Gibson MD  Discharging Service: Hospitalist     Primary Provider: No Ref-Primary, Physician  Primary Care Physician Phone Number: None         Discharge Diagnoses/Problem Oriented Hospital Course (Providers):    Marybel Lyon was admitted on 10/21/2022 by No admitting provider for patient encounter. and I would refer you to their history and physical.  The following problems were addressed during her hospitalization:  Marybel Lyon is a markedly pleasant 32 year old woman with past medical history that is most notable for Crohn's Disease, who presents with acute abdominal pain, nausea and vomiting, and is found to have suspected acute SBO due to Crohn's colitis.     PLAN     Suspected acute SBO due to Crohn's colitis: Ms. Lyon was diagnosed with Crohn's Disease in 2012, and used to be on 6MP. Her last flare of disease was in 2020, at which time she was hospitalized with a mechanical small bowel obstruction, which resolved with conservative measures. Since 2021 she has miguel on Stelara. She follows with [facility]. MR enterography in 1/2022 was notable for 10 cm loop of thickened ileum. She has not required any prior abdominal surgeries. She presents now for acute abdominal pain, nausea and vomiting. She is afebrile in the ED, with concomitant mild leukocytosis. X-ray shows asymmetric gas and fluid distended small bowel loops in the midabdomen, worrisome for mechanical obstruction. Her course seems already to be significantly improving in the ED with Zofran, Pepcid, and Dilaudid.     -- Inpatient. NPO. IV  ml/hour ordered. Tylenol, IV Dilaudid as needed for pain. Anti-emetics as needed.    -- She was given  Prednisone 40 mg PO earlier in the ED tonight and it seems she has been able to keep it down.   [facility] consulted and are following  Patient was started on prednisone and taper with come down by 10 mg every week ordered  Patient is tolerating full liquid diet  Advance diet to low fiber today  Patient had multiple loose bowel movements and she felt a lot better after the bowel movements  We will discharge her today with prednisone taper with close follow-up with [facility]        Rule Out COVID-19 infection  This patient was evaluated during a global COVID-19 pandemic, which necessitated consideration that the patient might be at risk for infection with the SARS-CoV-2 virus that causes COVID-19. Applicable protocols for evaluation were followed during the patient's care.   -negative COVID-19 PCR test result  -no current indication for precautions        DVT Prophylaxis: Pneumatic Compression Devices and ambulation  Code Status: Full Code     Disposition:  Home today in stable condition     Discussed with bedside RN and patient today     Danita Gibson MD, MD  [phone] (P)                   Code Status:      Full Code        Brief Hospital Stay Summary Sent Home With Patient in AVS:        Reason for your hospital stay      Chrons disease flare with SBO improved with steroids                 Important Results:      Please see below        Pending Results:        Unresulted Labs Ordered in the Past 30 Days of this Admission     Date and Time Order Name Status Description    10/19/2022  8:34 AM USTEKINUMAB LEVEL AND ANTIBODIES In process             Discharge Instructions and Follow-Up:      Follow-up Appointments     Follow-up and recommended labs and tests       F/u with [facility] in 1-2weeks for chron' S flare with SBO               Discharge Disposition:      Discharged to home        Discharge Medications:        Current Discharge Medication List      START taking these medications    Details   predniSONE (DELTASONE)  "20 MG tablet Take 2 tablets (40 mg) by mouth daily For 4days then come down by 10mg every 7days  Qty: 90 tablet, Refills: 0    Associated Diagnoses: Crohn's disease of both small and large intestine without complication (H)         CONTINUE these medications which have NOT CHANGED    Details   ustekinumab (STELARA) 90 MG/ML Inject 1 ml ( 90 mg) subcutaneous every 6 weeks.  Qty: 1 mL, Refills: 5    Associated Diagnoses: Crohn's disease (H)      paragard intrauterine copper device 1 each by Intrauterine route once    Associated Diagnoses: Encounter for insertion of intrauterine contraceptive device               Allergies:         Allergies   Allergen Reactions     Other Environmental Allergy Itching and Visual Disturbance           Consultations This Hospital Stay:      Consultation during this admission received from gastroenterology        Condition and Physical on Discharge:      Discharge condition: Stable   Vitals: Blood pressure 126/87, pulse 85, temperature 98.6  F (37  C), temperature source Oral, resp. rate 16, height 1.626 m (5' 4\"), weight 60 kg (132 lb 4.4 oz), SpO2 94 %, not currently breastfeeding.     Constitutional:  Alert awake, not in acute distress, pleasant young female lying comfortably in bed   Lungs:  Clear to auscultation bilaterally   Cardiovascular:  Normal rate rhythm regular, no murmurs   Abdomen:  Soft, nontender, nondistended, bowel sounds positive no guarding rigidity or rebound   Skin:  Warm and dry   Other:          Discharge Time:      Less  than 30 minutes.        Image Results From This Hospital Stay (For Non-EPIC Providers):        Results for orders placed or performed during the hospital encounter of 10/21/22   Abdomen XR, 2 vw, flat and upright    Narrative    EXAM: XR ABDOMEN 2 VIEWS  LOCATION: [facility]  DATE/TIME: 10/21/2022 1:59 AM    INDICATION: Crohn's disease. Abdominal pain. History of small bowel obstruction.  COMPARISON: MR abdomen " enterography without/with IV contrast 1/17/2022.      Impression    IMPRESSION: Asymmetric gas and fluid distended small bowel loops in the midabdomen, worrisome for mechanical obstruction. No free gas. No opaque urinary tract calculi. Minor right convex thoracolumbar curve. IUCD.           Most Recent Lab Results In EPIC (For Non-EPIC Providers):    Most Recent 3 CBC's:  Recent Labs   Lab Test 10/22/22  0731 10/21/22  0012 10/19/22  0839   WBC 8.6 13.5* 5.1   HGB 10.0* 11.7 11.5*   MCV 81 82 83    299 307      Most Recent 3 BMP's:  Recent Labs   Lab Test 10/22/22  0731 10/21/22  0012    141   POTASSIUM 3.5 3.6   CHLORIDE 109 106   CO2 25 26   BUN 9 9   CR 0.47* 0.64   ANIONGAP 4 9   DALE 8.3* 9.0   GLC 92 159*     Most Recent 3 Troponin's:No lab results found.    Invalid input(s): TROP, TROPONINIES  Most Recent 3 INR's:No lab results found.  Most Recent 2 LFT's:  Recent Labs   Lab Test 10/22/22  0731 10/21/22  0012   AST 13 24   ALT 19 27   ALKPHOS 30* 40   BILITOTAL 0.4 0.4     Most Recent Cholesterol Panel:No lab results found.  Most Recent 6 Bacteria Isolates From Any Culture (See EPIC Reports for Culture Details):No lab results found.  Most Recent TSH, T4 and HgbA1c:   Recent Labs   Lab Test 10/19/22  0839   TSH 1.23

## 2022-10-25 ENCOUNTER — PATIENT OUTREACH (OUTPATIENT)
Dept: CARE COORDINATION | Facility: CLINIC | Age: 32
End: 2022-10-25

## 2022-10-25 NOTE — PROGRESS NOTES
[facility] Care Resource Center Contact  [facility]/Voicemail     Clinical Data: Transitional Care Management Outreach     Outreach attempted x 2.  Left message on patient's voicemail, providing [facility]'s 24/7 scheduling and nurse triage phone number [phone] ([phone]) for questions/concerns and/or to schedule an appt with an [facility] provider, if they do not have a PCP.      Plan:  [facility] will do no further outreaches at this time.       Lela Cordova RN  Connected Care Resource [place], [facility]    *Connected Care Resource Team does NOT follow patient ongoing. Referrals are identified based on internal discharge reports and the outreach is to ensure patient has an understanding of their discharge instructions.

## 2022-10-28 LAB
SCANNED LAB RESULT: NORMAL
USTEKINUMAB ANTIBODIES: <1.6 U/ML
USTEKINUMAB CONCENTRATION: 4.2 UG/ML

## 2022-11-15 NOTE — PROGRESS NOTES
Problem: At Risk for Falls  Goal: # Patient does not fall  Outcome: Outcome Not Met, Continue to Monitor     Patient fall at 1426. See previous note for details.    RECEIVING UNIT ED HANDOFF REVIEW    ED Nurse Handoff Report was reviewed by: Batsheva Hendrickson on October 21, 2022 at 8:50 AM

## 2022-11-18 ENCOUNTER — TELEPHONE (OUTPATIENT)
Dept: GASTROENTEROLOGY | Facility: CLINIC | Age: 32
End: 2022-11-18

## 2022-11-18 DIAGNOSIS — K50.112 CROHN'S DISEASE OF COLON WITH INTESTINAL OBSTRUCTION (H): Primary | ICD-10-CM

## 2022-11-18 RX ORDER — BISACODYL 5 MG/1
TABLET, DELAYED RELEASE ORAL
Qty: 4 TABLET | Refills: 0 | Status: SHIPPED | OUTPATIENT
Start: 2022-11-18 | End: 2022-12-01

## 2022-11-18 NOTE — TELEPHONE ENCOUNTER
Attempted to contact patient regarding upcoming colonoscopy procedure on 11.30.2022 for pre assessment questions. No answer.     Left message to return call to [phone] #4    Discuss at home rapid antigen COVID test 1-2 days prior to procedure.    Arrival time: 1050    Facility location: [facility]    Sedation type: MAC    Indication for procedure: Crohn's    Bowel prep recommendation: Golytely prep d/t mg citrate recall    Prep instructions sent via [employer].  Prep prescription sent to    [id number] - [place] - [id number] [place] [facility] [id number] & [facility]     Dora Rowe RN

## 2022-11-23 NOTE — TELEPHONE ENCOUNTER
Patient returned call.     Pre assessment questions completed for upcoming colonoscopy  procedure scheduled on 11/30/22    COVID policy reviewed. Patient to complete rapid antigen test one to two days before their scheduled procedure. Patient to bring photo of the results when they come in for their procedure.    Reviewed procedural arrival time 1050 and facility location [facility].    Designated  policy reviewed. Instructed to have someone stay 24 hours post procedure.     Reviewed Golytely prep instructions. No fiber/iron supplements or foods that contain nuts/seeds 7 days prior to procedure.     Patient verbalized understanding and had no questions or concerns at this time.    Ana Cantu RN

## 2022-11-23 NOTE — TELEPHONE ENCOUNTER
2nd attempt    Attempted to contact patient regarding upcoming colonoscopy procedure on 11.30.22 for pre assessment questions. No answer.     Left message to return call to [phone] #4    Application [employer] message sent.    Margret Lopez RN

## 2022-11-28 ENCOUNTER — PRE VISIT (OUTPATIENT)
Dept: MATERNAL FETAL MEDICINE | Facility: CLINIC | Age: 32
End: 2022-11-28

## 2022-11-28 NOTE — PROGRESS NOTES
Maternal-Fetal Medicine Consultation    Marybel Lyon  : 1990  MRN: [id number]    REFERRAL:  Marybel Lyon is a 32 year old sent by Dr. Jones from GI clinic for MFM consultation.    HPI:  Marybel Lyon is a 32 year old  here for MFM preconception consultation regarding Crohn's disease.    She is here alone today.    She has a history of Crohn's disease affecting small bowel, which was diagnosed in  and was previously on mercaptopurine. She has been on Stelara since . She follows with Dr. Jones at [facility]. She has a history of small bowel obstructions, last in 2022 diagnosed by xray, at which time she was admitted and was conservatively treated, which resulted in resolution. She was discharged home on a prednisone taper as well, which she has completed. Since that hospitalization, she has been doing better. She underwent colonoscopy yesterday, which was normal.    She currently has a Paraguard IUD in place. Not currently on a prenatal vitamin.       Obstetrics History:  OB History    Para Term  AB Living   1 0 0 0 0 0   SAB IAB Ectopic Multiple Live Births   0 0 0 0 0      # Outcome Date GA Lbr Rajendra/2nd Weight Sex Delivery Anes PTL Lv   1                 Past Medical History:  Past Medical History:   Diagnosis Date     Abnormal Pap smear of cervix      Autoimmune disease (H)     Crohns     Crohn's disease (H) 2021     Hypertension 2020    Unsure it previously caused by steroid meds     SBO (small bowel obstruction) (H)      Uncomplicated asthma     Used inhaler in childhood, no issues as adult       Past Surgical History:  Past Surgical History:   Procedure Laterality Date     COLONOSCOPY       COLONOSCOPY N/A 2022    Procedure: COLONOSCOPY, WITH BIOPSY;  Surgeon: Shiv Jones MD;  Location: [facility] OR     EGD       TX ABLATE HEART DYSRHYTHM FOCUS         Current Medications:  Prior to Admission medications     Medication Sig Last Dose Taking? Auth Provider Long Term End Date   bisacodyl (DULCOLAX) 5 MG EC tablet Take as directed. One day before exam take 2 tablets at 3 PM. Take 2 tablets at 11 PM.   Shiv Jones MD     paragard intrauterine copper device 1 each by Intrauterine route once   Tahira Mcduffie APRN CNM     polyethylene glycol (GOLYTELY) 236 g suspension Take as directed. One day before exam fill the jug with water. Cover and shake until well mixed. At 6 PM start drinking an 8oz glass of mixture every 15 minutes until jug is 1/2 empty. Store remainder in the refrigerator.  At 11 PM Start drinking the other half of the Golytely jug. Drink one 8-ounce glass every 15 minutes until the jug is empty.   Shiv Jones MD     predniSONE (DELTASONE) 20 MG tablet Take 2 tablets (40 mg) by mouth daily For 4days then come down by 10mg every 7days   Danita Gibson MD     ustekinumab (STELARA) 90 MG/ML Inject 1 ml ( 90 mg) subcutaneous every 6 weeks.   Shady Caceres PA-C         Allergies:  Other environmental allergy    ROS:  10-point ROS negative except as in HPI     PHYSICAL EXAM:  /83 (BP Location: Right arm, Patient Position: Sitting, Cuff Size: Adult Regular)   Pulse 88   Resp 18   LMP 2022   SpO2 98%      Gen Appear: NAD    ASSESSMENT/PLAN:  Marybel Lyon is a 32 year old G0 here for M preconception consultation regarding:     Crohn's Disease  We discussed the IBD management in pregnancy is a collaborative effort that includes multidisciplinary care from the patient s GI physician and MFM with a goal of optimizing maternal and fetal outcomes prior to, during, and following pregnancy. Risks of IBD in pregnancy include miscarriage, fetal growth restriction/small for gestational age infant, premature delivery, poor maternal weight gain, and complications of labor and delivery including increased risk of preeclampsia, abruption, and  delivery. Specifically these  risks are significantly increased in the presence of active disease.     Due to the risks of active disease in pregnancy we recommend treatment of IBD in pregnancy. We recommend stability on her medication regimen for at least 3 months prior to conception and that most medications (including aminosalicylates, biologics, and immunomodulatory thiopurines) are low risk during pregnancy and breastfeeding. Methotrexate is an exception to that due to its teratogenicity and we would recommend discontinuation at least 3 months prior to conceiving. Corticosteroids can be utilized as adjunctive therapy in the setting of disease flares but should be avoided as maintenance therapy due to the increased risk of  birth, low birthweight, and hyperglycemia. We would recommend monitoring drug levels of biologics throughout pregnancy with dose adjustment as necessary. Additionally, biologic therapy should be continued in the third trimester (including biologics) without interruption. Transplacental transfer near delivery can be minimized by dose adjusting to achieve the lowest serum drug concentrations by the patient s time of delivery. Infants born to mothers taking biologics should receive all vaccines on schedule with the exception of no live vaccines in the first 6 months. Live vaccines at one year (MMR and varicella) can be given, even in breastfeeding infants of mothers on biologics.     We discussed that mode of delivery should be dictated by the usual obstetric indications unless the patient has a history of a prior rectovaginal fistula or there is evidence of active perianal disease, which can theoretically predispose to fistula formation if episiotomy or obstetrical/perineal laceration is incurred. Women with a prior ileal pouch anal anastomosis should have shared decision making regarding desires and potential protection of the anal sphincter. Women with IBD who undergo  delivery should receive prophylactic  lovenox during their hospitalization with consideration for continued use through 3-6 weeks postpartum. For pain control postpartum short courses (1-2 weeks) of NSAIDs can be utilized but longer coursed should be avoided given the link to IBD flares. Opioid induced constipation should be addressed with a concomitant bowel regimen. We discussed that with the exception of methotrexate and tofacitinib IBD medications are safe to be continued during breastfeeding and breastfeeding should be encouraged.      Recommendations:  - Initiate prenatal vitamins 4-6 weeks prior to conception.   - Close follow-up with gastroenterology  - Continuation of necessary medications to control symptoms of Crohn s disease with titration of medications per gastroenterology.  - Supplement iron, folate, vitamin B12, calcium and vitamin D if deficiencies noted.    - Early dating US  - Genetic counseling and nuchal translucency at 11-13 weeks.   - Comprehensive anatomy US at 18-20 weeks  - Serial growth scans every 4 weeks beginning at 28 weeks.   - Weekly  surveillance with BPPs (or NST plus MVP) beginning at 32 weeks.   - Delivery is recommended at 39 weeks, or sooner if otherwise clinically indicated.     - Patient is a candidate for vaginal delivery and  delivery is reserved for usual obstetric indications.    The patient was seen and evaluated with Dr. Barnett.    Thank you for allowing us to participate in the care of your patient. Please do not hesitate to contact us if you have further questions regarding the management of your patient.     Seth Guo MD  Maternal Fetal Medicine Fellow    Physician Attestation   I, Quinten Barnett MD, saw this patient and agree with the findings and plan of care as documented in the note.      Items personally reviewed/procedural attestation: History and plan of care/management. Total time spent in face-to-face counseling was 15 minutes (>50% for medical management discussion and  plan of  Care). A copy of this consult was sent to your office.     Quinten Barnett MD

## 2022-11-30 ENCOUNTER — ANESTHESIA EVENT (OUTPATIENT)
Dept: SURGERY | Facility: AMBULATORY SURGERY CENTER | Age: 32
End: 2022-11-30
Payer: COMMERCIAL

## 2022-11-30 ENCOUNTER — ANESTHESIA (OUTPATIENT)
Dept: SURGERY | Facility: AMBULATORY SURGERY CENTER | Age: 32
End: 2022-11-30
Payer: COMMERCIAL

## 2022-11-30 ENCOUNTER — NURSE TRIAGE (OUTPATIENT)
Dept: NURSING | Facility: CLINIC | Age: 32
End: 2022-11-30

## 2022-11-30 ENCOUNTER — HOSPITAL ENCOUNTER (OUTPATIENT)
Facility: AMBULATORY SURGERY CENTER | Age: 32
Discharge: HOME | End: 2022-11-30
Attending: INTERNAL MEDICINE
Payer: COMMERCIAL

## 2022-11-30 ENCOUNTER — TELEPHONE (OUTPATIENT)
Dept: GASTROENTEROLOGY | Facility: CLINIC | Age: 32
End: 2022-11-30

## 2022-11-30 VITALS
WEIGHT: 120 LBS | HEART RATE: 105 BPM | TEMPERATURE: 97.8 F | SYSTOLIC BLOOD PRESSURE: 137 MMHG | OXYGEN SATURATION: 100 % | RESPIRATION RATE: 16 BRPM | BODY MASS INDEX: 20.49 KG/M2 | DIASTOLIC BLOOD PRESSURE: 88 MMHG | HEIGHT: 64 IN

## 2022-11-30 VITALS — HEART RATE: 106 BPM

## 2022-11-30 DIAGNOSIS — K50.90 CROHN'S DISEASE WITHOUT COMPLICATION, UNSPECIFIED GASTROINTESTINAL TRACT LOCATION (H): ICD-10-CM

## 2022-11-30 LAB
COLONOSCOPY: NORMAL
HCG UR QL: NEGATIVE
INTERNAL QC OK POCT: NORMAL
POCT KIT EXPIRATION DATE: NORMAL
POCT KIT LOT NUMBER: NORMAL

## 2022-11-30 PROCEDURE — 88305 TISSUE EXAM BY PATHOLOGIST: CPT | Mod: 26 | Performed by: PATHOLOGY

## 2022-11-30 PROCEDURE — 45380 COLONOSCOPY AND BIOPSY: CPT

## 2022-11-30 PROCEDURE — 81025 URINE PREGNANCY TEST: CPT | Performed by: PATHOLOGY

## 2022-11-30 PROCEDURE — 88305 TISSUE EXAM BY PATHOLOGIST: CPT | Mod: TC | Performed by: INTERNAL MEDICINE

## 2022-11-30 RX ORDER — LIDOCAINE HYDROCHLORIDE 20 MG/ML
INJECTION, SOLUTION INFILTRATION; PERINEURAL PRN
Status: DISCONTINUED | OUTPATIENT
Start: 2022-11-30 | End: 2022-11-30

## 2022-11-30 RX ORDER — BUDESONIDE 3 MG/1
9 CAPSULE, COATED PELLETS ORAL EVERY MORNING
Qty: 90 CAPSULE | Refills: 0 | Status: SHIPPED | OUTPATIENT
Start: 2022-11-30 | End: 2023-03-27

## 2022-11-30 RX ORDER — LIDOCAINE 40 MG/G
CREAM TOPICAL
Status: DISCONTINUED | OUTPATIENT
Start: 2022-11-30 | End: 2022-12-01 | Stop reason: HOSPADM

## 2022-11-30 RX ORDER — PROPOFOL 10 MG/ML
INJECTION, EMULSION INTRAVENOUS CONTINUOUS PRN
Status: DISCONTINUED | OUTPATIENT
Start: 2022-11-30 | End: 2022-11-30

## 2022-11-30 RX ORDER — PROPOFOL 10 MG/ML
INJECTION, EMULSION INTRAVENOUS PRN
Status: DISCONTINUED | OUTPATIENT
Start: 2022-11-30 | End: 2022-11-30

## 2022-11-30 RX ORDER — SODIUM CHLORIDE, SODIUM LACTATE, POTASSIUM CHLORIDE, CALCIUM CHLORIDE 600; 310; 30; 20 MG/100ML; MG/100ML; MG/100ML; MG/100ML
INJECTION, SOLUTION INTRAVENOUS CONTINUOUS
Status: DISCONTINUED | OUTPATIENT
Start: 2022-11-30 | End: 2022-12-01 | Stop reason: HOSPADM

## 2022-11-30 RX ADMIN — PROPOFOL 40 MG: 10 INJECTION, EMULSION INTRAVENOUS at 11:45

## 2022-11-30 RX ADMIN — SODIUM CHLORIDE, SODIUM LACTATE, POTASSIUM CHLORIDE, CALCIUM CHLORIDE: 600; 310; 30; 20 INJECTION, SOLUTION INTRAVENOUS at 11:40

## 2022-11-30 RX ADMIN — PROPOFOL 70 MG: 10 INJECTION, EMULSION INTRAVENOUS at 11:43

## 2022-11-30 RX ADMIN — LIDOCAINE HYDROCHLORIDE 50 MG: 20 INJECTION, SOLUTION INFILTRATION; PERINEURAL at 11:43

## 2022-11-30 RX ADMIN — PROPOFOL 150 MCG/KG/MIN: 10 INJECTION, EMULSION INTRAVENOUS at 11:43

## 2022-11-30 RX ADMIN — PROPOFOL 30 MG: 10 INJECTION, EMULSION INTRAVENOUS at 11:51

## 2022-11-30 RX ADMIN — PROPOFOL 50 MG: 10 INJECTION, EMULSION INTRAVENOUS at 11:55

## 2022-11-30 NOTE — ANESTHESIA PREPROCEDURE EVALUATION
Anesthesia Pre-Procedure Evaluation    Patient: Marybel Lyon   MRN: [id number] : 1990        Procedure : Procedure(s):  COLONOSCOPY          Past Medical History:   Diagnosis Date     Abnormal Pap smear of cervix 2016     Autoimmune disease (H) 2012    Crohns     Crohn's disease (H) 2021     Hypertension 2020    Unsure it previously caused by steroid meds     SBO (small bowel obstruction) (H)      Uncomplicated asthma     Used inhaler in childhood, no issues as adult      Past Surgical History:   Procedure Laterality Date     COLONOSCOPY       EGD       [place] ABLATE HEART DYSRHYTHM FOCUS  1993      Allergies   Allergen Reactions     Other Environmental Allergy Itching and Visual Disturbance      Social History     Tobacco Use     Smoking status: Never     Smokeless tobacco: Never   Substance Use Topics     Alcohol use: Yes     Comment: occasional      Wt Readings from Last 1 Encounters:   22 54.4 kg (120 lb)        Anesthesia Evaluation            ROS/MED HX  ENT/Pulmonary:       Neurologic:       Cardiovascular:       METS/Exercise Tolerance:     Hematologic:       Musculoskeletal:       GI/Hepatic:     (+) Inflammatory bowel disease,     Renal/Genitourinary:       Endo:       Psychiatric/Substance Use:       Infectious Disease:       Malignancy:       Other:            Physical Exam    Airway        Mallampati: I       Respiratory Devices and Support         Dental           Cardiovascular          Rhythm and rate: regular     Pulmonary           breath sounds clear to auscultation           OUTSIDE LABS:  CBC:   Lab Results   Component Value Date    WBC 8.6 10/22/2022    WBC 13.5 (H) 10/21/2022    HGB 10.0 (L) 10/22/2022    HGB 11.7 10/21/2022    HCT 30.6 (L) 10/22/2022    HCT 35.9 10/21/2022     10/22/2022     10/21/2022     BMP:   Lab Results   Component Value Date     10/22/2022     10/21/2022    POTASSIUM 3.5 10/22/2022    POTASSIUM 3.6 10/21/2022     CHLORIDE 109 10/22/2022    CHLORIDE 106 10/21/2022    CO2 25 10/22/2022    CO2 26 10/21/2022    BUN 9 10/22/2022    BUN 9 10/21/2022    CR 0.47 (L) 10/22/2022    CR 0.64 10/21/2022    GLC 92 10/22/2022     (H) 10/21/2022     COAGS: No results found for: PTT, INR, FIBR  POC:   Lab Results   Component Value Date    HCG Negative 11/30/2022    HCGS Negative 10/21/2022     HEPATIC:   Lab Results   Component Value Date    ALBUMIN 2.8 (L) 10/22/2022    PROTTOTAL 5.5 (L) 10/22/2022    ALT 19 10/22/2022    AST 13 10/22/2022    ALKPHOS 30 (L) 10/22/2022    BILITOTAL 0.4 10/22/2022     OTHER:   Lab Results   Component Value Date    DALE 8.3 (L) 10/22/2022    LIPASE 127 10/21/2022    TSH 1.23 10/19/2022    CRP <2.9 10/21/2022    SED 11 10/21/2022       Anesthesia Plan    ASA Status:  1   NPO Status:  NPO Appropriate    Anesthesia Type: MAC.              Consents    Anesthesia Plan(s) and associated risks, benefits, and realistic alternatives discussed. Questions answered and patient/representative(s) expressed understanding.    - Discussed:     - Discussed with:  Patient         Postoperative Care            Comments:                Cesar Patiño MD

## 2022-11-30 NOTE — ANESTHESIA CARE TRANSFER NOTE
Patient: Marybel Lyon    Procedure: Procedure(s):  COLONOSCOPY, WITH BIOPSY       Diagnosis: Crohn's disease of small intestine without complication (H) [K50.00]  Diagnosis Additional Information: No value filed.    Anesthesia Type:   MAC     Note:    Oropharynx: oropharynx clear of all foreign objects  Level of Consciousness: drowsy  Oxygen Supplementation: room air    Independent Airway: airway patency satisfactory and stable  Dentition: dentition unchanged  Vital Signs Stable: post-procedure vital signs reviewed and stable  Report to RN Given: handoff report given  Patient transferred to: Phase II  Comments: Vital signs per nursing documentation.     Handoff Report: Identifed the Patient, Identified the Reponsible Provider, Reviewed the pertinent medical history, Discussed the surgical course, Reviewed Intra-OP anesthesia mangement and issues during anesthesia, Set expectations for post-procedure period and Allowed opportunity for questions and acknowledgement of understanding      Vitals:  Vitals Value Taken Time   BP     Temp     Pulse 102    Resp 13    SpO2 99%        Electronically Signed By: AASHISH Campo CRNA  November 30, 2022  12:02 PM

## 2022-11-30 NOTE — H&P
Marybel Lyon  [id number]  female  32 year old      Reason for procedure/surgery: Crohn's disease    Patient Active Problem List   Diagnosis     Crohn's disease (H)     ASCUS with positive high risk HPV cervical     Crohn's disease of small intestine (H)     Crohn's disease of colon with intestinal obstruction (H)     Painful menstruation     Multiple nevi     IUD (intrauterine device) in place     Elevated blood pressure reading without diagnosis of hypertension     SBO (small bowel obstruction) (H)     Crohn's disease of both small and large intestine without complication (H)       Past Surgical History:    Past Surgical History:   Procedure Laterality Date     COLONOSCOPY       EGD       [place] ABLATE HEART DYSRHYTHM FOCUS  1993       Past Medical History:   Past Medical History:   Diagnosis Date     Abnormal Pap smear of cervix 2016     Autoimmune disease (H) 2012    Crohns     Crohn's disease (H) 04/08/2021     Hypertension 12/2020    Unsure it previously caused by steroid meds     SBO (small bowel obstruction) (H) 2020     Uncomplicated asthma     Used inhaler in childhood, no issues as adult       Social History:   Social History     Tobacco Use     Smoking status: Never     Smokeless tobacco: Never   Substance Use Topics     Alcohol use: Yes       Family History:   Family History   Problem Relation Age of Onset     Other - See Comments Mother         Hysterectomy     Hypertension Father      Cerebrovascular Disease Maternal Grandmother 50     Colon Cancer Maternal Grandfather         60s     Melanoma Maternal Grandfather      Diabetes Type 1 Paternal Grandmother      Other - See Comments Paternal Grandmother         uterine prolapse     Heart Disease Paternal Grandfather 50        Heart attack     Hypertension Cousin        Allergies:   Allergies   Allergen Reactions     Other Environmental Allergy Itching and Visual Disturbance       Active Medications:   Current Outpatient Medications   Medication Sig  Dispense Refill     bisacodyl (DULCOLAX) 5 MG EC tablet Take as directed. One day before exam take 2 tablets at 3 PM. Take 2 tablets at 11 PM. 4 tablet 0     paragard intrauterine copper device 1 each by Intrauterine route once       polyethylene glycol (GOLYTELY) 236 g suspension Take as directed. One day before exam fill the jug with water. Cover and shake until well mixed. At 6 PM start drinking an 8oz glass of mixture every 15 minutes until jug is 1/2 empty. Store remainder in the refrigerator.  At 11 PM Start drinking the other half of the Golytely jug. Drink one 8-ounce glass every 15 minutes until the jug is empty. 4000 mL 0     predniSONE (DELTASONE) 20 MG tablet Take 2 tablets (40 mg) by mouth daily For 4days then come down by 10mg every 7days 90 tablet 0     ustekinumab (STELARA) 90 MG/ML Inject 1 ml ( 90 mg) subcutaneous every 6 weeks. 1 mL 5       Systemic Review:   CONSTITUTIONAL: NEGATIVE for fever, chills, change in weight  ENT/MOUTH: NEGATIVE for ear, mouth and throat problems  RESP: NEGATIVE for significant cough or SOB  CV: NEGATIVE for chest pain, palpitations or peripheral edema    Physical Examination:   Vital Signs: There were no vitals taken for this visit.  GENERAL: healthy, alert and no distress  NECK: no adenopathy, no asymmetry, masses, or scars  RESP: lungs clear to auscultation - no rales, rhonchi or wheezes  CV: regular rate and rhythm, normal S1 S2, no S3 or S4, no murmur, click or rub, no peripheral edema and peripheral pulses strong  ABDOMEN: soft, nontender, no hepatosplenomegaly, no masses and bowel sounds normal  MS: no gross musculoskeletal defects noted, no edema    Plan: Appropriate to proceed as scheduled.      Shiv Jones MD  11/30/2022    PCP:  No Ref-Primary, Physician

## 2022-11-30 NOTE — TELEPHONE ENCOUNTER
----- Message from Shiv Jones MD sent at 2022 12:12 PM CST -----  Dario Candelario     Marybel said her rifaxamin and budesonide for travelling to [place] ?    I can't ind them and am in endo    Can you reprder them and send them to [facility]?     Thanks!

## 2022-11-30 NOTE — TELEPHONE ENCOUNTER
Marybel is prepping for a Colonoscopy later today    She has started the 2nd half of her prep and now has extreme nausea  - Stool is currently clear yellow    Home Care advised  Care Advice reviewed, including:  TIPS TO RELIEVE NAUSEA DUE TO THE BOWEL PREP:  * If you vomit or feel very nauseated, stop taking the prep.  * Let your stomach rest for one hour.  * Then start sipping the bowel prep liquid again. Take a sip every 5 minutes.  * Try to drink as much of the bowel prep as possible.    Angelica Lindsay RN  [facility]      Reason for Disposition    Abdominal bloating, cramping, nausea, or vomiting while drinking bowel prep, questions about    Protocols used: COLONOSCOPY SYMPTOMS AND [id number]

## 2022-11-30 NOTE — ANESTHESIA POSTPROCEDURE EVALUATION
Patient: Marybel Lyon    Procedure: Procedure(s):  COLONOSCOPY, WITH BIOPSY       Anesthesia Type:  MAC    Note:  Disposition: Outpatient   Postop Pain Control: Uneventful            Sign Out: Well controlled pain   PONV: No   Neuro/Psych: Uneventful            Sign Out: Acceptable/Baseline neuro status   Airway/Respiratory: Uneventful            Sign Out: Acceptable/Baseline resp. status   CV/Hemodynamics: Uneventful            Sign Out: Acceptable CV status; No obvious hypovolemia; No obvious fluid overload   Other NRE: NONE   DID A NON-ROUTINE EVENT OCCUR?            Last vitals:  Vitals Value Taken Time   /88 11/30/22 1215   Temp 36.6  C (97.8  F) 11/30/22 1215   Pulse 105 11/30/22 1215   Resp 16 11/30/22 1215   SpO2 100 % 11/30/22 1215       Electronically Signed By: Cesar Patiño MD  November 30, 2022  12:20 PM

## 2022-12-01 ENCOUNTER — OFFICE VISIT (OUTPATIENT)
Dept: MATERNAL FETAL MEDICINE | Facility: CLINIC | Age: 32
End: 2022-12-01
Attending: OBSTETRICS & GYNECOLOGY
Payer: COMMERCIAL

## 2022-12-01 VITALS
SYSTOLIC BLOOD PRESSURE: 132 MMHG | DIASTOLIC BLOOD PRESSURE: 83 MMHG | HEART RATE: 88 BPM | RESPIRATION RATE: 18 BRPM | OXYGEN SATURATION: 98 %

## 2022-12-01 DIAGNOSIS — Z31.69 ENCOUNTER FOR PRECONCEPTION CONSULTATION: ICD-10-CM

## 2022-12-01 DIAGNOSIS — K50.90 CROHN'S DISEASE WITHOUT COMPLICATION, UNSPECIFIED GASTROINTESTINAL TRACT LOCATION (H): Primary | ICD-10-CM

## 2022-12-01 LAB
PATH REPORT.COMMENTS IMP SPEC: NORMAL
PATH REPORT.COMMENTS IMP SPEC: NORMAL
PATH REPORT.FINAL DX SPEC: NORMAL
PATH REPORT.GROSS SPEC: NORMAL
PATH REPORT.MICROSCOPIC SPEC OTHER STN: NORMAL
PATH REPORT.RELEVANT HX SPEC: NORMAL
PHOTO IMAGE: NORMAL

## 2022-12-01 PROCEDURE — 99215 OFFICE O/P EST HI 40 MIN: CPT | Mod: GC | Performed by: INTERNAL MEDICINE

## 2022-12-01 PROCEDURE — 99241 PR OFFICE CONSULTATION,LEVEL I: CPT | Mod: GC | Performed by: OBSTETRICS & GYNECOLOGY

## 2022-12-01 ASSESSMENT — PAIN SCALES - GENERAL: PAINLEVEL: NO PAIN (0)

## 2022-12-01 NOTE — PROGRESS NOTES
[facility]:     New Consult for: Preconception counseling      Date of visit: 12/1/2022       CC: 32 year old female referred by Dr. Barnett for evaluation of preconception counseling in the setting of IBD    ASSESSMENT AND PLAN:  Marybel Lyon is a very pleasant 32 year old female with Crohn's disease of the small bowel (ileum and likely jejunum) who [facility].     #. Preconception counseling  #. Crohn's disease involving the small bowel on Stelara  She is currently doing well in clinical remission without GI symptoms (HBI: 0). Colonoscopy 11/30 did not show inflammation in the TI and colon (SES-CD: 0). She did have an brief hospitalization with potentially small bowel obstruction in October, raising concern for inflammation of the small bowel proximal to the ileum examined on colonoscopy.  To further investigate the possibility, it is reasonable to obtain MR enterography to ensure she is in endoscopic remission.      We talked about that people with IBD in remission do not have increased risk for infertility as compared to people with IBD.  However history of IBD related abdominal surgery does increase risk of infertility.      We also talked about many topics on IBD and pregnancy.   1.  Safety of IBD medications.  We discussed that IBD medications especially Biologics are safe in pregnancy.  She can continue to take Stelara.  If she were ever to experience disease flare during pregnancy, it is generally safe to switch to other Biologics and steroid treatment.      When she becomes pregnant, dosing schedule of the Stelara will be adjusted so that she will be due for Stelara right after delivery to minimize the fetal exposure around the time of delivery and to decrease risk of postpartum flare.   2.  Monitoring of Biologics during pregnancy.  There is no additional lab monitoring for Stelara except for routine lab monitoring.   3.  Monitoring for  disease flares.  In addition to clinical symptoms and routine labs, it is recommended to obtain fecal calprotectin prior to conception and during each trimester and postpartum.   4.  Fetal monitoring during pregnancy. MFM will typically have frequent fetal monitoring during pregnancy.     Comprehensive ultrasound at 18 weeks    Growth ultrasounds every four weeks starting at 24 weeks (24, 28, 32, 36 weeks)    Weekly  testing at 32 weeks  5. Timing and mode of delivery.  It is recommended that women with moderate-severe IBD should deliver at 39 weeks.  She has no perianal disease, therefore no contraindication to vaginal delivery.   6.  Avoid live vaccine for the baby in the first 6 months.  No rotavirus vaccination for the baby.   7.  Safety of Biologics for breastfeeding.  Biologics are generally safe for breast feeding.  She can continue Stelara while breast-feeding.  8.  The probability of pre-eclampsia developing IBD.  The offspring of parents with IBD is estimated to have about 10% chance of developing IBD disease later in life.  Patient has no IBD history in first-degree relatives but does have cousins on both sides of family who are diagnosed with IBD.  Her partner's cousin also has Crohn's disease.  Therefore, the possibility of her offspring having IBD may be slightly higher than 10%.   9. Food choice. Generally, we recommend avoiding processed food.  Recommend a Mediterranean diet focused on whole foods. Given her recent small bowel obstruction, recommend low fiber diet at this time.    Recommendations:   -- Repeat MRE to evaluate for any small bowel inflammation. Timing is not urgent and can be done in 2023 as already planned by Dr. Jones   -- Low fiber diet for now given recent small bowel obstruction until MRE  -- Nutrition referral to Dr. Cantu to discuss IBD diet     Return to Clinic: as needed if more questions or become pregnant.         Patient discussed and seen with [facility] staff Dr. Ponce, who is in agreement with the above.     Jennifer Pineda MD, PhD  Gastroenterology Fellow  Division of Gastroenterology, Hepatology and Nutrition  [facility]      ====================================================================================================================================  HPI:   Marybel Lyon is a very pleasant 32 year old female with Crohn's disease of the small bowel (ileum and likely jejunum).  Crohn's disease was diagnosed 10 years ago and she is currently on Stelara every 6 weeks.  In October, she was hospitalized for possible small bowel obstruction which resolved and she was treated with a short course of prednisone. She has been doing well since.  Colonoscopy yesterday 11/30 showed no active disease in the TI and colon.     She currently is in a relationship and plans to get pregnant in 1 to 2 years.  She has questions about safety of diabetes medications during pregnancy and what to expect during pregnancy and postpartum.       Targeted GI review of systems complete and is otherwise negative.     Past Medical History:   Diagnosis Date     Abnormal Pap smear of cervix 2016     Autoimmune disease (H) 2012    Crohns     Crohn's disease (H) 04/08/2021     Hypertension 12/2020    Unsure it previously caused by steroid meds     SBO (small bowel obstruction) (H) 2020     Uncomplicated asthma     Used inhaler in childhood, no issues as adult       Past Surgical History:   Procedure Laterality Date     COLONOSCOPY       COLONOSCOPY N/A 11/30/2022    Procedure: COLONOSCOPY, WITH BIOPSY;  Surgeon: Shiv Jones MD;  Location: [facility] OR     EGD       [place] ABLATE HEART DYSRHYTHM FOCUS  1993       Family History   Problem Relation Age of Onset     Other - See Comments Mother         Hysterectomy     Hypertension Father      Cerebrovascular Disease Maternal Grandmother 50     Colon Cancer Maternal Grandfather         60s     Melanoma Maternal  Grandfather      Diabetes Type 1 Paternal Grandmother      Other - See Comments Paternal Grandmother         uterine prolapse     Heart Disease Paternal Grandfather 50        Heart attack     Hypertension Cousin        Social History     Tobacco Use     Smoking status: Never     Smokeless tobacco: Never   Substance Use Topics     Alcohol use: Yes     Comment: occasional       Physical exam:     Vitals:LMP 11/30/2022    BMI: There is no height or weight on file to calculate BMI.      GEN: NAD, A&Ox3, appropriate  HEENT: EOMI, PERRLA, MMM, hearing grossly intact  Neck: full ROM  Cardiopulmonary: non labored, comfortable on RA, nondiaphoretic, no LE edema  Abdominal: soft, nontender, nondistended  Skin: no jaundice, no gross lesions on visible skin  Neuro: A&Ox3, grossly intact motor/sensation, gait intact  MSK: no gross deformity, moves arms/legs equally  Psych: normal affect, congruent with mood      Labs:   Most recent CBC, CMP, CRP and ESR in 10/2022 were WNL    Relevant imaging:       Endoscopy:  Colonoscopy (11/30/2022):  Impression:            Crohn's disease in remission on Stelara.                          - Simple Endoscopic Score for Crohn's disease: 0,                          mucosal inflammatory changes secondary to Crohn's                          disease, in remission. Biopsied: ileum     Biopsy:   A. ILEUM BIOPSY:  - Small intestinal mucosa with no significant histologic abnormality  - Negative for active inflammation, granuloma formation, pseudopyloric metaplasia and dysplasia      Relevant surgical reports:   No past abdominal surgery.

## 2022-12-01 NOTE — NURSING NOTE
Marybel here today by herself for GI and MFM consults. VSS, medications and allergies reviewed (see flowsheets). Dr. Barnett and Dr. Guo met with patient, see consult note.

## 2022-12-02 DIAGNOSIS — K50.00 CROHN'S DISEASE OF SMALL INTESTINE WITHOUT COMPLICATION (H): Primary | ICD-10-CM

## 2022-12-02 NOTE — PROGRESS NOTES
"Follow-up for Dr. Ponce after M consult. Patient interested in learning more about the \"different Crohn's diets available\". Nutrition consult ordered.  "

## 2022-12-05 ENCOUNTER — TELEPHONE (OUTPATIENT)
Dept: GASTROENTEROLOGY | Facility: CLINIC | Age: 32
End: 2022-12-05

## 2022-12-07 NOTE — PROGRESS NOTES
I performed a history and physical examination of the above patient and discussed the management with Dr. Pineda on 12/1/2022. I reviewed the note and there are no changes to the past medical, family or social history.  A complete 10 point review of systems was obtained. Please see the HPI for pertinent positives and negatives. All other systems were reviewed and were found to be negative.     Earline Ponce MD  GI Attending  Pager: [phone]

## 2022-12-08 ENCOUNTER — TELEPHONE (OUTPATIENT)
Dept: GASTROENTEROLOGY | Facility: CLINIC | Age: 32
End: 2022-12-08

## 2022-12-08 NOTE — TELEPHONE ENCOUNTER
[email or web address] sent my chart message     Schedule appointment with pt before 12/9 per provider if possible due to provider being done after 12/19.

## 2022-12-09 DIAGNOSIS — K50.90 CROHN'S DISEASE (H): ICD-10-CM

## 2022-12-12 RX ORDER — USTEKINUMAB 90 MG/ML
INJECTION, SOLUTION SUBCUTANEOUS
Qty: 1 ML | Refills: 5 | OUTPATIENT
Start: 2022-12-12

## 2023-01-02 DIAGNOSIS — K50.90 CROHN'S DISEASE (H): ICD-10-CM

## 2023-01-02 RX ORDER — USTEKINUMAB 90 MG/ML
INJECTION, SOLUTION SUBCUTANEOUS
Qty: 1 ML | Refills: 3 | Status: SHIPPED | OUTPATIENT
Start: 2023-01-02 | End: 2023-06-08

## 2023-03-27 ENCOUNTER — OFFICE VISIT (OUTPATIENT)
Dept: GASTROENTEROLOGY | Facility: CLINIC | Age: 33
End: 2023-03-27
Payer: COMMERCIAL

## 2023-03-27 VITALS
HEART RATE: 78 BPM | DIASTOLIC BLOOD PRESSURE: 86 MMHG | BODY MASS INDEX: 21.83 KG/M2 | OXYGEN SATURATION: 100 % | WEIGHT: 127.9 LBS | HEIGHT: 64 IN | SYSTOLIC BLOOD PRESSURE: 130 MMHG

## 2023-03-27 DIAGNOSIS — K50.00 CROHN'S DISEASE OF SMALL INTESTINE WITHOUT COMPLICATION (H): Primary | ICD-10-CM

## 2023-03-27 PROCEDURE — 99213 OFFICE O/P EST LOW 20 MIN: CPT | Performed by: INTERNAL MEDICINE

## 2023-03-27 ASSESSMENT — PAIN SCALES - GENERAL: PAINLEVEL: NO PAIN (0)

## 2023-03-27 NOTE — PROGRESS NOTES
IBD CLINIC VISIT     CC/REFERRING MD:  Dr. Akin Bernal  REASON FOR FOLLOW UP: Crohn's    ASSESSMENT/PLAN  32 year old female with Crohn's disease of the small bowel (ileum and likely jejunum)    1. Small bowel crohn's disease:    Current medication:    Ustekinumab every 8 weeks (started 5/2021)   Ustekinumab every 6 weeks (Spring 2022)  Current clinical disease activity: remission, HBI 0   Last endoscopic disease activity: 11/30/22: SES-CD: 0  Last radiographic disease assessment: MRE 1/17/22 with moderate segmental length of ileum (10cm approx) with diffuse wall thickening consistent with acute and chronic inflammation.    She continues to be in clinical remission at this time.  Colonoscopy now without any detectable inflammation on ustekinumab every 6 weeks. Plan to continue this regimen for maintenance of remission.     She is pre contemplative for pregnancy. Discussed [place] registry. Will refer her to study team. Should she get pregnant in next year, will have her see in IPREP clinic again.     -- Blood work including vitamin B12  -- Continue Stelara q6 weeks.     Colon cancer screening:  Given disease is limited to small bowel, colon cancer screening is recommended at age 45.     Misc:  -- Avoid tobacco use  -- Avoid NSAIDs as there is potentially a 25% chance of causing an IBD flare    RTC 6 months    Shiv Jones MD MS    [facility]  Inflammatory Bowel Disease Program  Division of Gastroenterology, Hepatology and Nutrition  Pager: [phone]     CROHN'S HISTORY:  Age at diagnosis: 2012  Extent of disease: small bowel   Disease phenotype: inflammatory  Chayito-anal disease: none  Prior IBD surgeries: none  Prior IBD Medications:    50 mg 6MP    DRUG MONITORING  TPMT enzyme activity: 40.4 (6/14/2012)    6-TGN/6-MMPN levels: --    Biologic concentration:  Usteinumab 2/2022 = 1.6, moved to every 6 weeks     HPI:   Here for follow-up.   Trip to [place] went great. No issues.     Currently,  doing well. No increase in GI symptoms. Having 1 stool a day. Formed, no blood.     Colonoscopy in November was normal.     HBI:  Overall patient well being (prior day): 0 (Very well)  Abdominal pain (prior day): 0 (None)  Number of liquid or soft stools (prior day): 0 (1 point per stool)  Abdominal mass on exam: 0 (None)  Complications (1 point for each):   None    Remission <5  Mild activity 5-7  Moderate activity 8-16  Severe > 16    Extra intestinal manifestations of IBD:  No uveitis/episcleritis  No aphthous ulcers   No arthritis   No erythema nodosum/pyoderma gangrenosum.       ROS:  Constitutional, HEENT, cardiovascular, pulmonary, GI, , musculoskeletal, neuro, skin, endocrine and psych systems are negative, except as otherwise noted.     PERTINENT PAST MEDICAL HISTORY:  Past Medical History:   Diagnosis Date     Abnormal Pap smear of cervix 2016     Autoimmune disease (H) 2012    Crohns     Crohn's disease (H) 04/08/2021     Hypertension 12/2020    Unsure it previously caused by steroid meds     SBO (small bowel obstruction) (H) 2020     Uncomplicated asthma     Used inhaler in childhood, no issues as adult       PREVIOUS SURGERIES:  Past Surgical History:   Procedure Laterality Date     COLONOSCOPY       COLONOSCOPY N/A 11/30/2022    Procedure: COLONOSCOPY, WITH BIOPSY;  Surgeon: Shiv Jones MD;  Location: [facility] OR     EGD       [place] ABLATE HEART DYSRHYTHM FOCUS  1993     ALLERGIES:     Allergies   Allergen Reactions     Other Environmental Allergy Itching and Visual Disturbance       PERTINENT MEDICATIONS:    Current Outpatient Medications:      budesonide (ENTOCORT EC) 3 MG EC capsule, Take 3 capsules (9 mg) by mouth every morning (Patient not taking: Reported on 12/1/2022), Disp: 90 capsule, Rfl: 0     paragard intrauterine copper device, 1 each by Intrauterine route once, Disp: , Rfl:      rifaximin (XIFAXAN) 200 MG tablet, Take 2 tablets (400 mg) by mouth 3 times daily (Patient not taking:  Reported on 12/1/2022), Disp: 60 tablet, Rfl: 0     ustekinumab (STELARA) 90 MG/ML, Inject 1 ml ( 90 mg) subcutaneous every 6 weeks. More refills after next clinic appt, Disp: 1 mL, Rfl: 3    SOCIAL HISTORY:  Social History     Socioeconomic History     Marital status: Single     Spouse name: Not on file     Number of children: Not on file     Years of education: Not on file     Highest education level: Not on file   Occupational History     Not on file   Tobacco Use     Smoking status: Never     Smokeless tobacco: Never   Substance and Sexual Activity     Alcohol use: Yes     Comment: occasional     Drug use: Never     Sexual activity: Yes     Partners: Male     Birth control/protection: Condom, None   Other Topics Concern     Parent/sibling w/ CABG, MI or angioplasty before 65F 55M? No   Social History Narrative     Not on file     Social Determinants of Health     Financial Resource Strain: Not on file   Food Insecurity: Not on file   Transportation Needs: Not on file   Physical Activity: Not on file   Stress: Not on file   Social Connections: Not on file   Intimate Partner Violence: Not on file   Housing Stability: Not on file       FAMILY HISTORY:  Cousin has UC  Family History   Problem Relation Age of Onset     Other - See Comments Mother         Hysterectomy     Hypertension Father      Cerebrovascular Disease Maternal Grandmother 50     Colon Cancer Maternal Grandfather         60s     Melanoma Maternal Grandfather      Diabetes Type 1 Paternal Grandmother      Other - See Comments Paternal Grandmother         uterine prolapse     Heart Disease Paternal Grandfather 50        Heart attack     Hypertension Cousin        Past/family/social history reviewed and no changes    PHYSICAL EXAMINATION:  Constitutional: aaox3, cooperative, pleasant, not dyspneic/diaphoretic, no acute distress  Vitals reviewed: There were no vitals taken for this visit.  Wt:   Wt Readings from Last 2 Encounters:   11/30/22 54.4 kg (120  lb)   10/23/22 60 kg (132 lb 4.4 oz)      Constitutional - general appearance is well and in no acute distress. Body habitus normal  Eyes - No redness or discharge  Respiratory - No cough, unlabored breathing  Musculoskeletal - range of motion intact: Neck and arms  Skin - No discoloration or lesions  Neurological - No tremors, headaches  Psychiatric - No anxiety, alert & oriented

## 2023-03-27 NOTE — NURSING NOTE
"Chief Complaint   Patient presents with     Follow Up     Crohn's disease of small intestine without complication       Vitals:    03/27/23 1349   BP: 130/86   BP Location: Right arm   Patient Position: Sitting   Cuff Size: Adult Regular   Pulse: 78   SpO2: 100%   Weight: 58 kg (127 lb 14.4 oz)   Height: 1.626 m (5' 4\")       Body mass index is 21.95 kg/m .    Ijeoma Matute, JOSEFINAF  "

## 2023-03-27 NOTE — PATIENT INSTRUCTIONS
It was a pleasure taking care of you today. I've included a brief summary of our discussion and care plan from today's visit below.  Please review this information with your primary care provider.  _______________________________________________________________________    My recommendations are summarized as follows:  Continue Stelara every 6 weeks    Lab work due at this time.     Return to GI Clinic in 6 months to review your progress.     If you need any follow-up appointments, please use the following phone numbers below.    To schedule or reschedule a follow-up GI appointment, call [phone] option 1    To schedule your endoscopy procedure, call [phone] option 2    To schedule imaging, please call [phone]     To schedule your lab appointment at [address] lab call [phone]. Call your [place] lab directly if it is not [facility]. If you use a non-[place] lab, please let Dario know where to fax your lab order at [phone].      _______________________________________________________________________    Please be in touch if there are any further questions that arise following today's visit.  There are multiple ways to contact your gastroenterology care team.      During business hours, you may reach your gastroenterology RN Care Coordinator, Dario, at [phone].     You can always send a secure message through iFlexMe. iFlexMe messages are answered by your nurse or doctor typically within 24 hours. Please allow extra time on weekends and holidays.     What is iFlexMe?  iFlexMe is a secure way for you to access all of your healthcare records from the [facility].  It is a web based computer program, so you can sign on to it from any location.  It also allows you to send secure messages to your care team.  I recommend signing up for iFlexMe access if you have not already done so and are comfortable with using a computer.     For  urgent/emergent questions after business hours, you may reach the on-call GI Fellow by contacting the [facility]  at [phone].     How will I get the results of any tests ordered?    You will receive all of your results.  If you have signed up for [employer], any tests ordered at your visit will be available to you after your physician reviews them.  Typically this takes 1-2 weeks.  If there are urgent results that require a change in your care plan, your physician or nurse will call you to discuss the next steps.      Thank you for choosing [facility]!       Sincerely,    Shiv Jones MD  [facility]  Division of Gastroenterology

## 2023-03-27 NOTE — LETTER
3/27/2023         RE: Marybel Lyon  [address] [place] [address]        Dear Colleague,    Thank you for referring your patient, Marybel Lyon, to the [facility] [place]. Please see a copy of my visit note below.    IBD CLINIC VISIT     CC/REFERRING MD:  Dr. Akin Bernal  REASON FOR FOLLOW UP: Crohn's    ASSESSMENT/PLAN  32 year old female with Crohn's disease of the small bowel (ileum and likely jejunum)    1. Small bowel crohn's disease:    Current medication:    Ustekinumab every 8 weeks (started 5/2021)   Ustekinumab every 6 weeks (Spring 2022)  Current clinical disease activity: remission, HBI 0   Last endoscopic disease activity: 11/30/22: SES-CD: 0  Last radiographic disease assessment: MRE 1/17/22 with moderate segmental length of ileum (10cm approx) with diffuse wall thickening consistent with acute and chronic inflammation.    She continues to be in clinical remission at this time.  Colonoscopy now without any detectable inflammation on ustekinumab every 6 weeks. Plan to continue this regimen for maintenance of remission.     She is pre contemplative for pregnancy. Discussed [place] registry. Will refer her to study team. Should she get pregnant in next year, will have her see in IPREP clinic again.     -- Blood work including vitamin B12  -- Continue Stelara q6 weeks.     Colon cancer screening:  Given disease is limited to small bowel, colon cancer screening is recommended at age 45.     Misc:  -- Avoid tobacco use  -- Avoid NSAIDs as there is potentially a 25% chance of causing an IBD flare    RTC 6 months    Shiv Jones MD MS    [facility]  Inflammatory Bowel Disease Program  Division of Gastroenterology, Hepatology and Nutrition  Pager: [phone]     CROHN'S HISTORY:  Age at diagnosis: 2012  Extent of disease: small bowel   Disease phenotype: inflammatory  Chayito-anal disease: none  Prior IBD surgeries: none  Prior IBD Medications:     50 mg 6MP    DRUG MONITORING  TPMT enzyme activity: 40.4 (6/14/2012)    6-TGN/6-MMPN levels: --    Biologic concentration:  Usteinumab 2/2022 = 1.6, moved to every 6 weeks     HPI:   Here for follow-up.   Trip to [place] went great. No issues.     Currently, doing well. No increase in GI symptoms. Having 1 stool a day. Formed, no blood.     Colonoscopy in November was normal.     HBI:  Overall patient well being (prior day): 0 (Very well)  Abdominal pain (prior day): 0 (None)  Number of liquid or soft stools (prior day): 0 (1 point per stool)  Abdominal mass on exam: 0 (None)  Complications (1 point for each):   None    Remission <5  Mild activity 5-7  Moderate activity 8-16  Severe > 16    Extra intestinal manifestations of IBD:  No uveitis/episcleritis  No aphthous ulcers   No arthritis   No erythema nodosum/pyoderma gangrenosum.       ROS:  Constitutional, HEENT, cardiovascular, pulmonary, GI, , musculoskeletal, neuro, skin, endocrine and psych systems are negative, except as otherwise noted.     PERTINENT PAST MEDICAL HISTORY:  Past Medical History:   Diagnosis Date     Abnormal Pap smear of cervix 2016     Autoimmune disease (H) 2012    Crohns     Crohn's disease (H) 04/08/2021     Hypertension 12/2020    Unsure it previously caused by steroid meds     SBO (small bowel obstruction) (H) 2020     Uncomplicated asthma     Used inhaler in childhood, no issues as adult       PREVIOUS SURGERIES:  Past Surgical History:   Procedure Laterality Date     COLONOSCOPY       COLONOSCOPY N/A 11/30/2022    Procedure: COLONOSCOPY, WITH BIOPSY;  Surgeon: Shiv Jones MD;  Location: [facility] OR     EGD       [place] ABLATE HEART DYSRHYTHM FOCUS  1993     ALLERGIES:     Allergies   Allergen Reactions     Other Environmental Allergy Itching and Visual Disturbance       PERTINENT MEDICATIONS:    Current Outpatient Medications:      budesonide (ENTOCORT EC) 3 MG EC capsule, Take 3 capsules (9 mg) by mouth every morning (Patient  not taking: Reported on 12/1/2022), Disp: 90 capsule, Rfl: 0     paragard intrauterine copper device, 1 each by Intrauterine route once, Disp: , Rfl:      rifaximin (XIFAXAN) 200 MG tablet, Take 2 tablets (400 mg) by mouth 3 times daily (Patient not taking: Reported on 12/1/2022), Disp: 60 tablet, Rfl: 0     ustekinumab (STELARA) 90 MG/ML, Inject 1 ml ( 90 mg) subcutaneous every 6 weeks. More refills after next clinic appt, Disp: 1 mL, Rfl: 3    SOCIAL HISTORY:  Social History     Socioeconomic History     Marital status: Single     Spouse name: Not on file     Number of children: Not on file     Years of education: Not on file     Highest education level: Not on file   Occupational History     Not on file   Tobacco Use     Smoking status: Never     Smokeless tobacco: Never   Substance and Sexual Activity     Alcohol use: Yes     Comment: occasional     Drug use: Never     Sexual activity: Yes     Partners: Male     Birth control/protection: Condom, None   Other Topics Concern     Parent/sibling w/ CABG, MI or angioplasty before 65F 55M? No   Social History Narrative     Not on file     Social Determinants of Health     Financial Resource Strain: Not on file   Food Insecurity: Not on file   Transportation Needs: Not on file   Physical Activity: Not on file   Stress: Not on file   Social Connections: Not on file   Intimate Partner Violence: Not on file   Housing Stability: Not on file       FAMILY HISTORY:  Cousin has UC  Family History   Problem Relation Age of Onset     Other - See Comments Mother         Hysterectomy     Hypertension Father      Cerebrovascular Disease Maternal Grandmother 50     Colon Cancer Maternal Grandfather         60s     Melanoma Maternal Grandfather      Diabetes Type 1 Paternal Grandmother      Other - See Comments Paternal Grandmother         uterine prolapse     Heart Disease Paternal Grandfather 50        Heart attack     Hypertension Cousin        Past/family/social history reviewed  and no changes    PHYSICAL EXAMINATION:  Constitutional: aaox3, cooperative, pleasant, not dyspneic/diaphoretic, no acute distress  Vitals reviewed: There were no vitals taken for this visit.  Wt:   Wt Readings from Last 2 Encounters:   11/30/22 54.4 kg (120 lb)   10/23/22 60 kg (132 lb 4.4 oz)      Constitutional - general appearance is well and in no acute distress. Body habitus normal  Eyes - No redness or discharge  Respiratory - No cough, unlabored breathing  Musculoskeletal - range of motion intact: Neck and arms  Skin - No discoloration or lesions  Neurological - No tremors, headaches  Psychiatric - No anxiety, alert & oriented                Again, thank you for allowing me to participate in the care of your patient.        Sincerely,        Shiv Jones MD

## 2023-04-03 ENCOUNTER — LAB (OUTPATIENT)
Dept: LAB | Facility: CLINIC | Age: 33
End: 2023-04-03
Payer: COMMERCIAL

## 2023-04-03 DIAGNOSIS — K50.00 CROHN'S DISEASE OF SMALL INTESTINE WITHOUT COMPLICATION (H): ICD-10-CM

## 2023-04-03 LAB
ALBUMIN SERPL BCG-MCNC: 4.2 G/DL (ref 3.5–5.2)
ALP SERPL-CCNC: 41 U/L (ref 35–104)
ALT SERPL W P-5'-P-CCNC: 15 U/L (ref 10–35)
AST SERPL W P-5'-P-CCNC: 24 U/L (ref 10–35)
BILIRUB DIRECT SERPL-MCNC: <0.2 MG/DL (ref 0–0.3)
BILIRUB SERPL-MCNC: 0.2 MG/DL
CRP SERPL-MCNC: <3 MG/L
ERYTHROCYTE [DISTWIDTH] IN BLOOD BY AUTOMATED COUNT: 15.4 % (ref 10–15)
ERYTHROCYTE [SEDIMENTATION RATE] IN BLOOD BY WESTERGREN METHOD: 8 MM/HR (ref 0–20)
HCT VFR BLD AUTO: 34.8 % (ref 35–47)
HGB BLD-MCNC: 10.7 G/DL (ref 11.7–15.7)
MCH RBC QN AUTO: 23.8 PG (ref 26.5–33)
MCHC RBC AUTO-ENTMCNC: 30.7 G/DL (ref 31.5–36.5)
MCV RBC AUTO: 78 FL (ref 78–100)
PLATELET # BLD AUTO: 467 10E3/UL (ref 150–450)
PROT SERPL-MCNC: 6.9 G/DL (ref 6.4–8.3)
RBC # BLD AUTO: 4.49 10E6/UL (ref 3.8–5.2)
VIT B12 SERPL-MCNC: 723 PG/ML (ref 232–1245)
WBC # BLD AUTO: 6.4 10E3/UL (ref 4–11)

## 2023-04-03 PROCEDURE — 82607 VITAMIN B-12: CPT

## 2023-04-03 PROCEDURE — 86140 C-REACTIVE PROTEIN: CPT

## 2023-04-03 PROCEDURE — 85027 COMPLETE CBC AUTOMATED: CPT

## 2023-04-03 PROCEDURE — 36415 COLL VENOUS BLD VENIPUNCTURE: CPT

## 2023-04-03 PROCEDURE — 80076 HEPATIC FUNCTION PANEL: CPT

## 2023-04-03 PROCEDURE — 85652 RBC SED RATE AUTOMATED: CPT

## 2023-04-10 DIAGNOSIS — K50.00 CROHN'S DISEASE OF SMALL INTESTINE WITHOUT COMPLICATION (H): Primary | ICD-10-CM

## 2023-04-10 DIAGNOSIS — D50.0 IRON DEFICIENCY ANEMIA DUE TO CHRONIC BLOOD LOSS: ICD-10-CM

## 2023-04-11 ENCOUNTER — LAB (OUTPATIENT)
Dept: LAB | Facility: CLINIC | Age: 33
End: 2023-04-11
Payer: COMMERCIAL

## 2023-04-11 DIAGNOSIS — K50.00 CROHN'S DISEASE OF SMALL INTESTINE WITHOUT COMPLICATION (H): ICD-10-CM

## 2023-04-11 DIAGNOSIS — D50.0 IRON DEFICIENCY ANEMIA DUE TO CHRONIC BLOOD LOSS: ICD-10-CM

## 2023-04-11 LAB
FERRITIN SERPL-MCNC: 10 NG/ML (ref 6–175)
IRON BINDING CAPACITY (ROCHE): 318 UG/DL (ref 240–430)
IRON SATN MFR SERPL: 4 % (ref 15–46)
IRON SERPL-MCNC: 12 UG/DL (ref 37–145)

## 2023-04-11 PROCEDURE — 83540 ASSAY OF IRON: CPT

## 2023-04-11 PROCEDURE — 82728 ASSAY OF FERRITIN: CPT

## 2023-04-11 PROCEDURE — 83550 IRON BINDING TEST: CPT

## 2023-04-11 PROCEDURE — 36415 COLL VENOUS BLD VENIPUNCTURE: CPT

## 2023-04-12 ENCOUNTER — TELEPHONE (OUTPATIENT)
Dept: GASTROENTEROLOGY | Facility: CLINIC | Age: 33
End: 2023-04-12
Payer: COMMERCIAL

## 2023-04-12 DIAGNOSIS — K50.90 CROHN'S DISEASE WITHOUT COMPLICATION, UNSPECIFIED GASTROINTESTINAL TRACT LOCATION (H): ICD-10-CM

## 2023-04-12 DIAGNOSIS — D50.9 IRON DEFICIENCY ANEMIA, UNSPECIFIED: ICD-10-CM

## 2023-04-12 DIAGNOSIS — D50.8 IRON DEFICIENCY ANEMIA SECONDARY TO INADEQUATE DIETARY IRON INTAKE: Primary | ICD-10-CM

## 2023-04-12 RX ORDER — METHYLPREDNISOLONE SODIUM SUCCINATE 125 MG/2ML
125 INJECTION, POWDER, LYOPHILIZED, FOR SOLUTION INTRAMUSCULAR; INTRAVENOUS
Status: CANCELLED
Start: 2023-04-12

## 2023-04-12 RX ORDER — EPINEPHRINE 1 MG/ML
0.3 INJECTION, SOLUTION, CONCENTRATE INTRAVENOUS EVERY 5 MIN PRN
Status: CANCELLED | OUTPATIENT
Start: 2023-04-12

## 2023-04-12 RX ORDER — ALBUTEROL SULFATE 90 UG/1
1-2 AEROSOL, METERED RESPIRATORY (INHALATION)
Status: CANCELLED
Start: 2023-04-12

## 2023-04-12 RX ORDER — MEPERIDINE HYDROCHLORIDE 25 MG/ML
25 INJECTION INTRAMUSCULAR; INTRAVENOUS; SUBCUTANEOUS EVERY 30 MIN PRN
Status: CANCELLED | OUTPATIENT
Start: 2023-04-12

## 2023-04-12 RX ORDER — DIPHENHYDRAMINE HYDROCHLORIDE 50 MG/ML
50 INJECTION INTRAMUSCULAR; INTRAVENOUS
Status: CANCELLED
Start: 2023-04-12

## 2023-04-12 RX ORDER — ALBUTEROL SULFATE 0.83 MG/ML
2.5 SOLUTION RESPIRATORY (INHALATION)
Status: CANCELLED | OUTPATIENT
Start: 2023-04-12

## 2023-04-12 NOTE — TELEPHONE ENCOUNTER
----- Message from Shiv Jones MD sent at 4/12/2023  8:31 AM CDT -----  Can you please coordinate venofer for her  Iron deficient anemia     Please and thank you

## 2023-04-12 NOTE — TELEPHONE ENCOUNTER
Spoke with Marybel and relayed MD message/recommendations for venofer. Pt will get TB done at next lab appt.  Patient acknowledged understanding and agree with plan. Answer all questions offered contact information for pt to call with any questions or concerns.    RV:  10/4/23  LV: 3/27/23  Small bowel crohn's disease:    Current medication:               Ustekinumab every 8 weeks (started 5/2021)              Ustekinumab every 6 weeks (Spring 2022)    Last TB:  Verify TB status: NEGATIVE 4/15/21    New Therapy Plan:  Venofer 200 mg x5  Order TB/Hepatitis B series:  TB    Home Infusion/[facility]: [facility]    ADV THERAPIES ,  Please see new therapy plan in pt's chart.  Please obtain PA.    Thank you.  Dario

## 2023-04-14 NOTE — TELEPHONE ENCOUNTER
April 14, 2023    Called Rehan No message and contact info to return call regarding: Venofer    Sent [employer] message to call Alex to schedule venofer infusion.

## 2023-04-14 NOTE — TELEPHONE ENCOUNTER
[email or web address] sent my chart message     Schedule new pt with Cirilo Engle. His last day is 12/19 so don't schedule after that.    4 = No assist / stand by assistance

## 2023-04-17 NOTE — TELEPHONE ENCOUNTER
Per chart review, pt made appt for her venofer infusion for  1 = 4/24/23  2 = 4/28/23  3 = 5/4/23  4 = 5/8/23  5 = 5/10/23  At [facility].

## 2023-04-24 ENCOUNTER — INFUSION THERAPY VISIT (OUTPATIENT)
Dept: INFUSION THERAPY | Facility: CLINIC | Age: 33
End: 2023-04-24
Attending: INTERNAL MEDICINE
Payer: COMMERCIAL

## 2023-04-24 VITALS
DIASTOLIC BLOOD PRESSURE: 92 MMHG | HEART RATE: 98 BPM | SYSTOLIC BLOOD PRESSURE: 136 MMHG | RESPIRATION RATE: 15 BRPM | TEMPERATURE: 98.8 F | OXYGEN SATURATION: 97 %

## 2023-04-24 DIAGNOSIS — K50.90 CROHN'S DISEASE WITHOUT COMPLICATION, UNSPECIFIED GASTROINTESTINAL TRACT LOCATION (H): ICD-10-CM

## 2023-04-24 DIAGNOSIS — D50.9 IRON DEFICIENCY ANEMIA, UNSPECIFIED: Primary | ICD-10-CM

## 2023-04-24 DIAGNOSIS — D50.9 IRON DEFICIENCY ANEMIA, UNSPECIFIED IRON DEFICIENCY ANEMIA TYPE: ICD-10-CM

## 2023-04-24 PROCEDURE — 86481 TB AG RESPONSE T-CELL SUSP: CPT

## 2023-04-24 PROCEDURE — 250N000011 HC RX IP 250 OP 636: Performed by: INTERNAL MEDICINE

## 2023-04-24 PROCEDURE — 36415 COLL VENOUS BLD VENIPUNCTURE: CPT

## 2023-04-24 PROCEDURE — 96365 THER/PROPH/DIAG IV INF INIT: CPT

## 2023-04-24 PROCEDURE — 258N000003 HC RX IP 258 OP 636: Performed by: INTERNAL MEDICINE

## 2023-04-24 RX ORDER — DIPHENHYDRAMINE HYDROCHLORIDE 50 MG/ML
50 INJECTION INTRAMUSCULAR; INTRAVENOUS
Status: CANCELLED
Start: 2023-04-26

## 2023-04-24 RX ORDER — ALBUTEROL SULFATE 90 UG/1
1-2 AEROSOL, METERED RESPIRATORY (INHALATION)
Status: CANCELLED
Start: 2023-04-26

## 2023-04-24 RX ORDER — METHYLPREDNISOLONE SODIUM SUCCINATE 125 MG/2ML
125 INJECTION, POWDER, LYOPHILIZED, FOR SOLUTION INTRAMUSCULAR; INTRAVENOUS
Status: CANCELLED
Start: 2023-04-26

## 2023-04-24 RX ORDER — MEPERIDINE HYDROCHLORIDE 25 MG/ML
25 INJECTION INTRAMUSCULAR; INTRAVENOUS; SUBCUTANEOUS EVERY 30 MIN PRN
Status: CANCELLED | OUTPATIENT
Start: 2023-04-26

## 2023-04-24 RX ORDER — EPINEPHRINE 1 MG/ML
0.3 INJECTION, SOLUTION INTRAMUSCULAR; SUBCUTANEOUS EVERY 5 MIN PRN
Status: CANCELLED | OUTPATIENT
Start: 2023-04-26

## 2023-04-24 RX ORDER — ALBUTEROL SULFATE 0.83 MG/ML
2.5 SOLUTION RESPIRATORY (INHALATION)
Status: CANCELLED | OUTPATIENT
Start: 2023-04-26

## 2023-04-24 RX ADMIN — IRON SUCROSE 200 MG: 20 INJECTION, SOLUTION INTRAVENOUS at 09:30

## 2023-04-24 NOTE — PATIENT INSTRUCTIONS
Dear Marybel Lyon    Thank you for choosing [facility] ([facility]) for your infusion.  The following information is a summary of our appointment as well as important reminders.      We look forward in seeing you on your next appointment here at [facility] ([facility]).  Please don t hesitate to call us at [phone] to reschedule any of your appointments or to speak with one of the [facility] registered nurses.  It was a pleasure taking care of you today.    Sincerely,    [facility]  [facility]  [address] [place] [address]  [address]  Phone:  [phone]

## 2023-04-24 NOTE — PROGRESS NOTES
Infusion Nursing Note:  Marybel Lyon presents today for first dose Venofer. Patient has 5 ordered doses for this series.    Patient seen by provider today: No   present during visit today: Not Applicable.    Note:   Venofer infused over 15 minutes.  Patient observed for 20 minutes post infusion, per protocol.    Intravenous Access:  Labs drawn without difficulty.  Peripheral IV placed.    Treatment Conditions:  Not Applicable.    Administrations This Visit     iron sucrose (VENOFER) 200 mg in sodium chloride 0.9 % 120 mL intermittent infusion     Admin Date  04/24/2023 Action  $New Bag Dose  200 mg Rate  480 mL/hr Route  Intravenous Administered By  Emilie Sun, SIMON              BP (!) 143/87 (BP Location: Left arm, Patient Position: Sitting, Cuff Size: Adult Small)   Pulse 98   Temp 98.8  F (37.1  C) (Oral)   Resp 15   SpO2 97%     Post Infusion Assessment:  Patient tolerated infusion without incident.  Blood return noted pre and post infusion.  Site patent and intact, free from redness, edema or discomfort.  No evidence of extravasations.  Access discontinued per protocol.       Discharge Plan:   Discharge instructions reviewed with: Patient.  Patient and/or family verbalized understanding of discharge instructions and all questions answered.  AVS to patient via [employer].  Patient will return 4/28 for next appointment.   Patient discharged in stable condition accompanied by: self.  Departure Mode: Ambulatory.      Emilie Sun RN

## 2023-04-25 LAB
GAMMA INTERFERON BACKGROUND BLD IA-ACNC: 0.01 IU/ML
M TB IFN-G BLD-IMP: NEGATIVE
M TB IFN-G CD4+ BCKGRND COR BLD-ACNC: 5.67 IU/ML
MITOGEN IGNF BCKGRD COR BLD-ACNC: 0.02 IU/ML
MITOGEN IGNF BCKGRD COR BLD-ACNC: 0.03 IU/ML
QUANTIFERON MITOGEN: 5.68 IU/ML
QUANTIFERON NIL TUBE: 0.01 IU/ML
QUANTIFERON TB1 TUBE: 0.04 IU/ML
QUANTIFERON TB2 TUBE: 0.03

## 2023-04-28 ENCOUNTER — INFUSION THERAPY VISIT (OUTPATIENT)
Dept: INFUSION THERAPY | Facility: CLINIC | Age: 33
End: 2023-04-28
Attending: INTERNAL MEDICINE
Payer: COMMERCIAL

## 2023-04-28 VITALS
SYSTOLIC BLOOD PRESSURE: 120 MMHG | TEMPERATURE: 98 F | HEART RATE: 78 BPM | OXYGEN SATURATION: 98 % | RESPIRATION RATE: 16 BRPM | DIASTOLIC BLOOD PRESSURE: 71 MMHG

## 2023-04-28 DIAGNOSIS — K50.90 CROHN'S DISEASE WITHOUT COMPLICATION, UNSPECIFIED GASTROINTESTINAL TRACT LOCATION (H): ICD-10-CM

## 2023-04-28 DIAGNOSIS — D50.9 IRON DEFICIENCY ANEMIA, UNSPECIFIED IRON DEFICIENCY ANEMIA TYPE: Primary | ICD-10-CM

## 2023-04-28 PROCEDURE — 250N000011 HC RX IP 250 OP 636: Performed by: INTERNAL MEDICINE

## 2023-04-28 PROCEDURE — 258N000003 HC RX IP 258 OP 636: Performed by: INTERNAL MEDICINE

## 2023-04-28 PROCEDURE — 96365 THER/PROPH/DIAG IV INF INIT: CPT

## 2023-04-28 RX ORDER — DIPHENHYDRAMINE HYDROCHLORIDE 50 MG/ML
50 INJECTION INTRAMUSCULAR; INTRAVENOUS
Status: CANCELLED
Start: 2023-04-30

## 2023-04-28 RX ORDER — METHYLPREDNISOLONE SODIUM SUCCINATE 125 MG/2ML
125 INJECTION, POWDER, LYOPHILIZED, FOR SOLUTION INTRAMUSCULAR; INTRAVENOUS
Status: CANCELLED
Start: 2023-04-30

## 2023-04-28 RX ORDER — MEPERIDINE HYDROCHLORIDE 25 MG/ML
25 INJECTION INTRAMUSCULAR; INTRAVENOUS; SUBCUTANEOUS EVERY 30 MIN PRN
Status: CANCELLED | OUTPATIENT
Start: 2023-04-30

## 2023-04-28 RX ORDER — ALBUTEROL SULFATE 90 UG/1
1-2 AEROSOL, METERED RESPIRATORY (INHALATION)
Status: CANCELLED
Start: 2023-04-30

## 2023-04-28 RX ORDER — EPINEPHRINE 1 MG/ML
0.3 INJECTION, SOLUTION INTRAMUSCULAR; SUBCUTANEOUS EVERY 5 MIN PRN
Status: CANCELLED | OUTPATIENT
Start: 2023-04-30

## 2023-04-28 RX ORDER — ALBUTEROL SULFATE 0.83 MG/ML
2.5 SOLUTION RESPIRATORY (INHALATION)
Status: CANCELLED | OUTPATIENT
Start: 2023-04-30

## 2023-04-28 RX ADMIN — IRON SUCROSE 200 MG: 20 INJECTION, SOLUTION INTRAVENOUS at 16:04

## 2023-04-28 NOTE — PROGRESS NOTES
"Chief Complaint   Patient presents with     Infusion     IV Venofer     Infusion Nursing Note:  Marybel Lyon presents today for IV Venofer dose 2/5.    Patient seen by provider today: No   present during visit today: Not Applicable.    Note: Venofer infused over 20 minutes.      Intravenous Access:  Peripheral IV placed.    Treatment Conditions:  Not Applicable.      Post Infusion Assessment:  Patient tolerated infusion without incident.  Blood return noted pre and post infusion.  Site patent and intact, free from redness, edema or discomfort.  No evidence of extravasations.  Access discontinued per protocol.       Discharge Plan:   [facility]  [facility] will [facility] infusions at [facility]  Patient discharged in stable condition accompanied by: self.  Departure Mode: Ambulatory.      Administrations This Visit     iron sucrose (VENOFER) 200 mg in sodium chloride 0.9 % 120 mL intermittent infusion     Admin Date  04/28/2023 Action  $New Bag Dose  200 mg Rate  480 mL/hr Route  Intravenous Administered By  El Schmidt RN                Vital signs:  Temp: 98  F (36.7  C) Temp src: Oral BP: 125/85 Pulse: 87   Resp: 16 SpO2: 98 % O2 Device: None (Room air)        Estimated body mass index is 21.95 kg/m  as calculated from the following:    Height as of 3/27/23: 1.626 m (5' 4\").    Weight as of 3/27/23: 58 kg (127 lb 14.4 oz).            "

## 2023-04-28 NOTE — PATIENT INSTRUCTIONS
Dear Marybel Lyon    Thank you for choosing [facility] ([facility]) for your infusion.  The following information is a summary of our appointment as well as important reminders.          We look forward in seeing you on your next appointment here at [facility] ([facility]).  Please don t hesitate to call us at [phone] to reschedule any of your appointments or to speak with one of the [facility] registered nurses.  It was a pleasure taking care of you today.    Sincerely,    [facility]  Special[facility]fusion [facility]  [address] [place] [address]  [address]  Phone:  [phone]

## 2023-05-04 ENCOUNTER — INFUSION THERAPY VISIT (OUTPATIENT)
Dept: INFUSION THERAPY | Facility: CLINIC | Age: 33
End: 2023-05-04
Attending: INTERNAL MEDICINE
Payer: COMMERCIAL

## 2023-05-04 VITALS
RESPIRATION RATE: 18 BRPM | SYSTOLIC BLOOD PRESSURE: 140 MMHG | TEMPERATURE: 98.2 F | DIASTOLIC BLOOD PRESSURE: 88 MMHG | HEART RATE: 98 BPM

## 2023-05-04 DIAGNOSIS — D50.9 IRON DEFICIENCY ANEMIA, UNSPECIFIED IRON DEFICIENCY ANEMIA TYPE: ICD-10-CM

## 2023-05-04 DIAGNOSIS — K50.90 CROHN'S DISEASE WITHOUT COMPLICATION, UNSPECIFIED GASTROINTESTINAL TRACT LOCATION (H): Primary | ICD-10-CM

## 2023-05-04 PROCEDURE — 96365 THER/PROPH/DIAG IV INF INIT: CPT

## 2023-05-04 PROCEDURE — 258N000003 HC RX IP 258 OP 636: Performed by: INTERNAL MEDICINE

## 2023-05-04 PROCEDURE — 250N000011 HC RX IP 250 OP 636: Performed by: INTERNAL MEDICINE

## 2023-05-04 RX ORDER — DIPHENHYDRAMINE HYDROCHLORIDE 50 MG/ML
50 INJECTION INTRAMUSCULAR; INTRAVENOUS
Status: CANCELLED
Start: 2023-05-06

## 2023-05-04 RX ORDER — ALBUTEROL SULFATE 90 UG/1
1-2 AEROSOL, METERED RESPIRATORY (INHALATION)
Status: CANCELLED
Start: 2023-05-06

## 2023-05-04 RX ORDER — MEPERIDINE HYDROCHLORIDE 25 MG/ML
25 INJECTION INTRAMUSCULAR; INTRAVENOUS; SUBCUTANEOUS EVERY 30 MIN PRN
Status: CANCELLED | OUTPATIENT
Start: 2023-05-06

## 2023-05-04 RX ORDER — METHYLPREDNISOLONE SODIUM SUCCINATE 125 MG/2ML
125 INJECTION, POWDER, LYOPHILIZED, FOR SOLUTION INTRAMUSCULAR; INTRAVENOUS
Status: CANCELLED
Start: 2023-05-06

## 2023-05-04 RX ORDER — ALBUTEROL SULFATE 0.83 MG/ML
2.5 SOLUTION RESPIRATORY (INHALATION)
Status: CANCELLED | OUTPATIENT
Start: 2023-05-06

## 2023-05-04 RX ORDER — EPINEPHRINE 1 MG/ML
0.3 INJECTION, SOLUTION INTRAMUSCULAR; SUBCUTANEOUS EVERY 5 MIN PRN
Status: CANCELLED | OUTPATIENT
Start: 2023-05-06

## 2023-05-04 RX ADMIN — IRON SUCROSE 200 MG: 20 INJECTION, SOLUTION INTRAVENOUS at 14:41

## 2023-05-04 RX ADMIN — SODIUM CHLORIDE 250 ML: 9 INJECTION, SOLUTION INTRAVENOUS at 14:42

## 2023-05-04 NOTE — PROGRESS NOTES
Infusion Nursing Note:  Marybel Lyon presents today for venofer 200.    Patient seen by provider today: No   present during visit today: Not Applicable.    Note: N/A.      Intravenous Access:  Peripheral IV placed.    Treatment Conditions:  Not Applicable.      Post Infusion Assessment:  Patient tolerated infusion without incident.  Blood return noted pre and post infusion.  Site patent and intact, free from redness, edema or discomfort.  No evidence of extravasations.  Access discontinued per protocol.       Discharge Plan:   Discharge instructions reviewed with: Patient.  Patient and/or family verbalized understanding of discharge instructions and all questions answered.  Patient discharged in stable condition accompanied by: self.  Departure Mode: Ambulatory.      Vonnie Gillespie RN

## 2023-05-08 ENCOUNTER — INFUSION THERAPY VISIT (OUTPATIENT)
Dept: INFUSION THERAPY | Facility: CLINIC | Age: 33
End: 2023-05-08
Attending: NURSE PRACTITIONER
Payer: COMMERCIAL

## 2023-05-08 VITALS
DIASTOLIC BLOOD PRESSURE: 84 MMHG | RESPIRATION RATE: 16 BRPM | TEMPERATURE: 98.1 F | HEART RATE: 77 BPM | SYSTOLIC BLOOD PRESSURE: 130 MMHG

## 2023-05-08 DIAGNOSIS — D50.9 IRON DEFICIENCY ANEMIA, UNSPECIFIED IRON DEFICIENCY ANEMIA TYPE: ICD-10-CM

## 2023-05-08 DIAGNOSIS — K50.90 CROHN'S DISEASE WITHOUT COMPLICATION, UNSPECIFIED GASTROINTESTINAL TRACT LOCATION (H): Primary | ICD-10-CM

## 2023-05-08 PROCEDURE — 258N000003 HC RX IP 258 OP 636: Performed by: INTERNAL MEDICINE

## 2023-05-08 PROCEDURE — 250N000011 HC RX IP 250 OP 636: Performed by: INTERNAL MEDICINE

## 2023-05-08 PROCEDURE — 96365 THER/PROPH/DIAG IV INF INIT: CPT

## 2023-05-08 RX ORDER — METHYLPREDNISOLONE SODIUM SUCCINATE 125 MG/2ML
125 INJECTION, POWDER, LYOPHILIZED, FOR SOLUTION INTRAMUSCULAR; INTRAVENOUS
Status: CANCELLED
Start: 2023-05-10

## 2023-05-08 RX ORDER — EPINEPHRINE 1 MG/ML
0.3 INJECTION, SOLUTION INTRAMUSCULAR; SUBCUTANEOUS EVERY 5 MIN PRN
Status: CANCELLED | OUTPATIENT
Start: 2023-05-10

## 2023-05-08 RX ORDER — ALBUTEROL SULFATE 0.83 MG/ML
2.5 SOLUTION RESPIRATORY (INHALATION)
Status: CANCELLED | OUTPATIENT
Start: 2023-05-10

## 2023-05-08 RX ORDER — ALBUTEROL SULFATE 90 UG/1
1-2 AEROSOL, METERED RESPIRATORY (INHALATION)
Status: CANCELLED
Start: 2023-05-10

## 2023-05-08 RX ORDER — MEPERIDINE HYDROCHLORIDE 25 MG/ML
25 INJECTION INTRAMUSCULAR; INTRAVENOUS; SUBCUTANEOUS EVERY 30 MIN PRN
Status: CANCELLED | OUTPATIENT
Start: 2023-05-10

## 2023-05-08 RX ORDER — DIPHENHYDRAMINE HYDROCHLORIDE 50 MG/ML
50 INJECTION INTRAMUSCULAR; INTRAVENOUS
Status: CANCELLED
Start: 2023-05-10

## 2023-05-08 RX ADMIN — SODIUM CHLORIDE 250 ML: 9 INJECTION, SOLUTION INTRAVENOUS at 12:16

## 2023-05-08 RX ADMIN — IRON SUCROSE 200 MG: 20 INJECTION, SOLUTION INTRAVENOUS at 12:16

## 2023-05-08 NOTE — PROGRESS NOTES
Infusion Nursing Note:  Marybel Lyon presents today for Venofer 200mg 4/5.    Patient seen by provider today: No   present during visit today: Not Applicable.    Note: N/A.      Intravenous Access:  Peripheral IV placed.    Treatment Conditions:  Not Applicable.      Post Infusion Assessment:  Patient tolerated infusion without incident.  Blood return noted pre and post infusion.  Site patent and intact, free from redness, edema or discomfort.  No evidence of extravasations.  Access discontinued per protocol.       Discharge Plan:   Discharge instructions reviewed with: Patient.  Patient and/or family verbalized understanding of discharge instructions and all questions answered.  AVS to patient via [employer].  Patient will return 5/10/23 for next appointment.   Patient discharged in stable condition accompanied by: self.  Departure Mode: Ambulatory.      Joey Gandhi RN

## 2023-05-10 ENCOUNTER — INFUSION THERAPY VISIT (OUTPATIENT)
Dept: INFUSION THERAPY | Facility: CLINIC | Age: 33
End: 2023-05-10
Attending: INTERNAL MEDICINE
Payer: COMMERCIAL

## 2023-05-10 VITALS
OXYGEN SATURATION: 97 % | HEART RATE: 88 BPM | SYSTOLIC BLOOD PRESSURE: 120 MMHG | DIASTOLIC BLOOD PRESSURE: 68 MMHG | TEMPERATURE: 98.5 F | RESPIRATION RATE: 16 BRPM

## 2023-05-10 DIAGNOSIS — D50.9 IRON DEFICIENCY ANEMIA, UNSPECIFIED IRON DEFICIENCY ANEMIA TYPE: ICD-10-CM

## 2023-05-10 DIAGNOSIS — K50.90 CROHN'S DISEASE WITHOUT COMPLICATION, UNSPECIFIED GASTROINTESTINAL TRACT LOCATION (H): Primary | ICD-10-CM

## 2023-05-10 PROCEDURE — 96365 THER/PROPH/DIAG IV INF INIT: CPT

## 2023-05-10 PROCEDURE — 258N000003 HC RX IP 258 OP 636: Performed by: INTERNAL MEDICINE

## 2023-05-10 PROCEDURE — 250N000011 HC RX IP 250 OP 636: Performed by: INTERNAL MEDICINE

## 2023-05-10 RX ORDER — ALBUTEROL SULFATE 90 UG/1
1-2 AEROSOL, METERED RESPIRATORY (INHALATION)
Status: CANCELLED
Start: 2023-05-12

## 2023-05-10 RX ORDER — EPINEPHRINE 1 MG/ML
0.3 INJECTION, SOLUTION INTRAMUSCULAR; SUBCUTANEOUS EVERY 5 MIN PRN
Status: CANCELLED | OUTPATIENT
Start: 2023-05-12

## 2023-05-10 RX ORDER — DIPHENHYDRAMINE HYDROCHLORIDE 50 MG/ML
50 INJECTION INTRAMUSCULAR; INTRAVENOUS
Status: CANCELLED
Start: 2023-05-12

## 2023-05-10 RX ORDER — METHYLPREDNISOLONE SODIUM SUCCINATE 125 MG/2ML
125 INJECTION, POWDER, LYOPHILIZED, FOR SOLUTION INTRAMUSCULAR; INTRAVENOUS
Status: CANCELLED
Start: 2023-05-12

## 2023-05-10 RX ORDER — ALBUTEROL SULFATE 0.83 MG/ML
2.5 SOLUTION RESPIRATORY (INHALATION)
Status: CANCELLED | OUTPATIENT
Start: 2023-05-12

## 2023-05-10 RX ORDER — MEPERIDINE HYDROCHLORIDE 25 MG/ML
25 INJECTION INTRAMUSCULAR; INTRAVENOUS; SUBCUTANEOUS EVERY 30 MIN PRN
Status: CANCELLED | OUTPATIENT
Start: 2023-05-12

## 2023-05-10 RX ADMIN — SODIUM CHLORIDE 250 ML: 9 INJECTION, SOLUTION INTRAVENOUS at 12:22

## 2023-05-10 RX ADMIN — IRON SUCROSE 200 MG: 20 INJECTION, SOLUTION INTRAVENOUS at 12:22

## 2023-05-10 ASSESSMENT — PAIN SCALES - GENERAL: PAINLEVEL: NO PAIN (0)

## 2023-05-10 NOTE — PROGRESS NOTES
Infusion Nursing Note:  Marybel Lyon presents today for venofer.    Patient seen by provider today: No   present during visit today: Not Applicable.    Note: Patient reports no new concerns since last venofer infusion on 5/8, and has noticed improvement in fatigue.      Intravenous Access:  Peripheral IV placed.    Treatment Conditions:  Not Applicable.      Post Infusion Assessment:  Patient tolerated infusion without incident.  Blood return noted pre and post infusion.  Site patent and intact, free from redness, edema or discomfort.  No evidence of extravasations.  Access discontinued per protocol.       Discharge Plan:   Discharge instructions reviewed with: Patient.  Patient and/or family verbalized understanding of discharge instructions and all questions answered.  AVS to patient via [employer].  Patient will return as needed for next appointment.   Patient discharged in stable condition accompanied by: self.  Departure Mode: Ambulatory.      Pat Borja RN

## 2023-06-05 DIAGNOSIS — K50.90 CROHN'S DISEASE (H): ICD-10-CM

## 2023-06-07 ENCOUNTER — MYC MEDICAL ADVICE (OUTPATIENT)
Dept: GASTROENTEROLOGY | Facility: CLINIC | Age: 33
End: 2023-06-07
Payer: COMMERCIAL

## 2023-06-08 RX ORDER — USTEKINUMAB 90 MG/ML
INJECTION, SOLUTION SUBCUTANEOUS
Qty: 1 ML | Refills: 3 | Status: SHIPPED | OUTPATIENT
Start: 2023-06-08 | End: 2023-11-09

## 2023-06-08 NOTE — TELEPHONE ENCOUNTER
ustekinumab (STELARA) 90 MG/ML   Last Written Prescription Date:  1/2/23  Last Fill Quantity: 1ml,   # refills: 3  Last Office Visit : 3/27/23  Future Office visit:  10/4/23    Lab Results   Component Value Date    WBC 6.4 04/03/2023    WBC 8.0 04/15/2021     Lab Results   Component Value Date    RBC 4.49 04/03/2023    RBC 4.64 04/15/2021     Lab Results   Component Value Date    HGB 10.7 04/03/2023    HGB 12.9 04/15/2021     Lab Results   Component Value Date    HCT 34.8 04/03/2023    HCT 39.4 04/15/2021     No components found for: MCT  Lab Results   Component Value Date    MCV 78 04/03/2023    MCV 85 04/15/2021     Lab Results   Component Value Date    MCH 23.8 04/03/2023    MCH 27.8 04/15/2021     Lab Results   Component Value Date    MCHC 30.7 04/03/2023    MCHC 32.7 04/15/2021     Lab Results   Component Value Date    RDW 15.4 04/03/2023    RDW 12.9 04/15/2021     Lab Results   Component Value Date     04/03/2023     04/15/2021     CRP Inflammation   Date Value Ref Range Status   04/03/2023 <3.00 <5.00 mg/L Final       Sed Rate   Date Value Ref Range Status   04/15/2021 8 0 - 20 mm/h Final     Erythrocyte Sedimentation Rate   Date Value Ref Range Status   04/03/2023 8 0 - 20 mm/hr Final     Lab Results   Component Value Date    AST 24 04/03/2023    AST 10 04/15/2021     Lab Results   Component Value Date    ALT 15 04/03/2023    ALT 18 04/15/2021

## 2023-06-23 ENCOUNTER — DOCUMENTATION ONLY (OUTPATIENT)
Dept: LAB | Facility: CLINIC | Age: 33
End: 2023-06-23
Payer: COMMERCIAL

## 2023-06-23 NOTE — PROGRESS NOTES
Spoke to patient on 6/23/23 at 0905. She is aware there are no lab orders in the chart. She will contact her provider for orders.

## 2023-06-27 ENCOUNTER — TELEPHONE (OUTPATIENT)
Dept: GASTROENTEROLOGY | Facility: CLINIC | Age: 33
End: 2023-06-27

## 2023-06-27 DIAGNOSIS — K50.90 CROHN'S DISEASE (H): Primary | ICD-10-CM

## 2023-06-27 NOTE — TELEPHONE ENCOUNTER
[facility] Call Center    Phone Message    May a detailed message be left on voicemail: yes     Reason for Call: Order(s): Other:   Reason for requested: checking iron levels  Date needed: asap  Provider name: Shiv Jones      Action Taken: Message routed to:  [facility] gi    Travel Screening: Not Applicable

## 2023-06-30 NOTE — TELEPHONE ENCOUNTER
Order standing labs for stelara monitoring. Pt completed Venofer on 5/10/23. Her next schedule labs in July. Ask pt to wait until the following labs to get Iron.

## 2023-07-06 ENCOUNTER — TELEPHONE (OUTPATIENT)
Dept: GASTROENTEROLOGY | Facility: CLINIC | Age: 33
End: 2023-07-06
Payer: COMMERCIAL

## 2023-07-06 ENCOUNTER — LAB (OUTPATIENT)
Dept: LAB | Facility: CLINIC | Age: 33
End: 2023-07-06
Payer: COMMERCIAL

## 2023-07-06 DIAGNOSIS — K50.90 CROHN'S DISEASE (H): ICD-10-CM

## 2023-07-06 LAB
ALBUMIN SERPL BCG-MCNC: 4.3 G/DL (ref 3.5–5.2)
ALP SERPL-CCNC: 39 U/L (ref 35–104)
ALT SERPL W P-5'-P-CCNC: 11 U/L (ref 0–50)
AST SERPL W P-5'-P-CCNC: 18 U/L (ref 0–45)
BASOPHILS # BLD AUTO: 0 10E3/UL (ref 0–0.2)
BASOPHILS NFR BLD AUTO: 0 %
BILIRUB DIRECT SERPL-MCNC: <0.2 MG/DL (ref 0–0.3)
BILIRUB SERPL-MCNC: 0.2 MG/DL
CRP SERPL-MCNC: <3 MG/L
EOSINOPHIL # BLD AUTO: 0.2 10E3/UL (ref 0–0.7)
EOSINOPHIL NFR BLD AUTO: 3 %
ERYTHROCYTE [DISTWIDTH] IN BLOOD BY AUTOMATED COUNT: 15.4 % (ref 10–15)
ERYTHROCYTE [SEDIMENTATION RATE] IN BLOOD BY WESTERGREN METHOD: 5 MM/HR (ref 0–20)
HCT VFR BLD AUTO: 43.4 % (ref 35–47)
HGB BLD-MCNC: 14.5 G/DL (ref 11.7–15.7)
IMM GRANULOCYTES # BLD: 0 10E3/UL
IMM GRANULOCYTES NFR BLD: 0 %
LYMPHOCYTES # BLD AUTO: 1.5 10E3/UL (ref 0.8–5.3)
LYMPHOCYTES NFR BLD AUTO: 20 %
MCH RBC QN AUTO: 28.9 PG (ref 26.5–33)
MCHC RBC AUTO-ENTMCNC: 33.4 G/DL (ref 31.5–36.5)
MCV RBC AUTO: 87 FL (ref 78–100)
MONOCYTES # BLD AUTO: 0.7 10E3/UL (ref 0–1.3)
MONOCYTES NFR BLD AUTO: 9 %
NEUTROPHILS # BLD AUTO: 5.4 10E3/UL (ref 1.6–8.3)
NEUTROPHILS NFR BLD AUTO: 69 %
PLATELET # BLD AUTO: 324 10E3/UL (ref 150–450)
PROT SERPL-MCNC: 6.9 G/DL (ref 6.4–8.3)
RBC # BLD AUTO: 5.02 10E6/UL (ref 3.8–5.2)
WBC # BLD AUTO: 7.8 10E3/UL (ref 4–11)

## 2023-07-06 PROCEDURE — 80076 HEPATIC FUNCTION PANEL: CPT

## 2023-07-06 PROCEDURE — 36415 COLL VENOUS BLD VENIPUNCTURE: CPT

## 2023-07-06 PROCEDURE — 85025 COMPLETE CBC W/AUTO DIFF WBC: CPT

## 2023-07-06 PROCEDURE — 86140 C-REACTIVE PROTEIN: CPT

## 2023-07-06 PROCEDURE — 85652 RBC SED RATE AUTOMATED: CPT

## 2023-07-06 NOTE — TELEPHONE ENCOUNTER
Prior Authorization Approval    Medication: USTEKINUMAB 90 MG/ML SC SOSY  Authorization Effective Date: 6/6/2023  Authorization Expiration Date: 7/5/2024  Approved Dose/Quantity: ud  Reference #: [id number]   Insurance Company: [employer] - Phone [phone] Fax [phone]  Expected CoPay:       CoPay Card Available:      Financial Assistance Needed:    Which Pharmacy is filling the prescription:    Pharmacy Notified:    Patient Notified:  Sent [employer] message

## 2023-07-06 NOTE — TELEPHONE ENCOUNTER
PA Initiation    Medication: USTEKINUMAB 90 MG/ML [facility]  Insurance Company: [employer] - Phone [phone] Fax [phone]  Pharmacy Filling the Rx:    Filling Pharmacy Phone:    Filling Pharmacy Fax:    Start Date: 7/6/2023    [id number]

## 2023-07-28 ENCOUNTER — MYC MEDICAL ADVICE (OUTPATIENT)
Dept: DERMATOLOGY | Facility: CLINIC | Age: 33
End: 2023-07-28
Payer: COMMERCIAL

## 2023-08-05 ENCOUNTER — HEALTH MAINTENANCE LETTER (OUTPATIENT)
Age: 33
End: 2023-08-05

## 2023-08-22 ENCOUNTER — OFFICE VISIT (OUTPATIENT)
Dept: FAMILY MEDICINE | Facility: CLINIC | Age: 33
End: 2023-08-22
Payer: COMMERCIAL

## 2023-08-22 DIAGNOSIS — D18.01 CHERRY ANGIOMA: ICD-10-CM

## 2023-08-22 DIAGNOSIS — L81.4 SOLAR LENTIGINOSIS: ICD-10-CM

## 2023-08-22 DIAGNOSIS — D22.9 MULTIPLE PIGMENTED NEVI: ICD-10-CM

## 2023-08-22 DIAGNOSIS — Z86.19 HISTORY OF HERPES ZOSTER: ICD-10-CM

## 2023-08-22 DIAGNOSIS — Z12.83 SKIN CANCER SCREENING: Primary | ICD-10-CM

## 2023-08-22 PROCEDURE — 99213 OFFICE O/P EST LOW 20 MIN: CPT | Performed by: PHYSICIAN ASSISTANT

## 2023-08-22 NOTE — LETTER
8/22/2023         RE: Marybel Lyon  [address] [place] [address]        Dear Colleague,    Thank you for referring your patient, Marybel Lyon, to the [facility]. Please see a copy of my visit note below.    [facility] Dermatology Note  Encounter Date: Aug 22, 2023  Office Visit     Dermatology Problem List:  1. Nevi to monitor  - Left flank, R plantar foot, L plantar foot - measurements and photos obtained 3/16/21 and stable on 9/20/21, 8/22/2022, 8/22/23  - used to follow at [facility] in [facility], records requested 3/16/21  2. Family hx of melanoma in grandfather     ____________________________________________    Assessment & Plan:    # Nevi to monitor  Left flank - photodocumentation in chart. Stable.  R plantar foot -  photodocumentation in chart. Stable.  L plantar foot -  photodocumentation in chart. Stable.     # Cherry angiomas  - Reassured of benign nature, no treatment necessary    # Multiple benign nevi  # Solar lentigines  - No concerning lesions today  - Monitor for ABCDEs of melanoma   - Continue sun protection - recommend SPF 30 or higher with frequent application   - Return sooner if noticing changing or symptomatic lesions     # Resolving eruption on the lower back and right buttocks.   -Consistent with herpes zoster.  Scarring will improve with time.      Procedures Performed:   None    Follow-up: 1 year(s) in-person, or earlier for new or changing lesions    Staff:  All risks, benefits and alternatives were discussed with patient.  Patient is in agreement and understands the assessment and plan.  All questions were answered.  Sun Screen Education was given.   Return to Clinic annually or sooner as needed.   Clarisa Ruggiero PA-C   [facility]   ____________________________________________    CC: Skin Check    HPI:  Ms. Marybel Lyon is a(n) 33 year old female who presents today as a  return patient for FBSE. Last seen in dermatology by myself, at which time lesions on the left flank, right plantar foot, and left plantar foot were noted to be stable in comparison to prior photodocumentation.    Today, patient denies specific spots of concern on her skin but did have a skin eruption on her lower back and right buttocks.  She sent me a [employer] message about this after she had the eruption for about    Patient is otherwise feeling well, without additional skin concerns.    Labs Reviewed:  N/A    Physical Exam:  Vitals: There were no vitals taken for this visit.  SKIN: Total skin excluding the undergarment areas was performed. The exam included the head/face, neck, both arms, chest, back, abdomen, both legs, digits and/or nails.   -She has a cluster of faint pink/violaceous macules on the lower back and right buttock  - R foot: 5x5 mm dark brown macule with lattice pattern under dermoscopy; several dark brown 2-3 mm macules. No change from previous photographs or dermoscopy.  - L foot  5x5 mm dark brown macule with lattice pattern under dermoscopy; several dark brown 2-3 mm macules. No change from previous photographs or dermoscopy.  - L flank  5x4 mm medium brown to red macule with irregular borders and globular pattern under dermoscopy. No change from previous photographs or dermoscopy.  - There are dome shaped bright red papules on the trunk and extremities.   - Multiple regular brown pigmented macules and papules are identified on the trunk and extremities.   - Scattered brown macules on sun exposed areas.  - No other lesions of concern on areas examined.     Medications:  Current Outpatient Medications   Medication     ustekinumab (STELARA) 90 MG/ML     No current facility-administered medications for this visit.      Past Medical History:   Patient Active Problem List   Diagnosis     Crohn's disease (H)     ASCUS with positive high risk HPV cervical     Crohn's disease of small intestine (H)      Crohn's disease of colon with intestinal obstruction (H)     Painful menstruation     Multiple nevi     IUD (intrauterine device) in place     Elevated blood pressure reading without diagnosis of hypertension     SBO (small bowel obstruction) (H)     Crohn's disease of both small and large intestine without complication (H)     Anemia, iron deficiency     Iron deficiency anemia, unspecified     Past Medical History:   Diagnosis Date     Abnormal Pap smear of cervix 2016     Autoimmune disease (H) 2012    Crohns     Crohn's disease (H) 04/08/2021     Hypertension 12/2020    Unsure it previously caused by steroid meds     SBO (small bowel obstruction) (H) 2020     Uncomplicated asthma     Used inhaler in childhood, no issues as adult        CC Referred Self, MD  No address on file on close of this encounter.      Again, thank you for allowing me to participate in the care of your patient.        Sincerely,        Clarisa Ruggiero PA-C

## 2023-08-22 NOTE — PROGRESS NOTES
[facility]ermatology Note  Encounter Date: Aug 22, 2023  Office Visit     Dermatology Problem List:  1. Nevi to monitor  - Left flank, R plantar foot, L plantar foot - measurements and photos obtained 3/16/21 and stable on 9/20/21, 8/22/2022, 8/22/23  - used to follow at [facility] in [facility], records requested 3/16/21  2. Family hx of melanoma in grandfather     ____________________________________________    Assessment & Plan:    # Nevi to monitor  Left flank - photodocumentation in chart. Stable.  R plantar foot -  photodocumentation in chart. Stable.  L plantar foot -  photodocumentation in chart. Stable.     # Cherry angiomas  - Reassured of benign nature, no treatment necessary    # Multiple benign nevi  # Solar lentigines  - No concerning lesions today  - Monitor for ABCDEs of melanoma   - Continue sun protection - recommend SPF 30 or higher with frequent application   - Return sooner if noticing changing or symptomatic lesions     # Resolving eruption on the lower back and right buttocks.   -Consistent with herpes zoster.  Scarring will improve with time.      Procedures Performed:   None    Follow-up: 1 year(s) in-person, or earlier for new or changing lesions    Staff:  All risks, benefits and alternatives were discussed with patient.  Patient is in agreement and understands the assessment and plan.  All questions were answered.  Sun Screen Education was given.   Return to Clinic annually or sooner as needed.   Clarisa Ruggiero PA-C   [facility]   ____________________________________________    CC: Skin Check    HPI:  Ms. Marybel Lyon is a(n) 33 year old female who presents today as a return patient for FBSE. Last seen in dermatology by myself, at which time lesions on the left flank, right plantar foot, and left plantar foot were noted to be stable in comparison to prior photodocumentation.    Today, patient denies specific spots of  concern on her skin but did have a skin eruption on her lower back and right buttocks.  She sent me a [email or web address] message about this after she had the eruption for about    Patient is otherwise feeling well, without additional skin concerns.    Labs Reviewed:  N/A    Physical Exam:  Vitals: There were no vitals taken for this visit.  SKIN: Total skin excluding the undergarment areas was performed. The exam included the head/face, neck, both arms, chest, back, abdomen, both legs, digits and/or nails.   -She has a cluster of faint pink/violaceous macules on the lower back and right buttock  - R foot: 5x5 mm dark brown macule with lattice pattern under dermoscopy; several dark brown 2-3 mm macules. No change from previous photographs or dermoscopy.  - L foot  5x5 mm dark brown macule with lattice pattern under dermoscopy; several dark brown 2-3 mm macules. No change from previous photographs or dermoscopy.  - L flank  5x4 mm medium brown to red macule with irregular borders and globular pattern under dermoscopy. No change from previous photographs or dermoscopy.  - There are dome shaped bright red papules on the trunk and extremities.   - Multiple regular brown pigmented macules and papules are identified on the trunk and extremities.   - Scattered brown macules on sun exposed areas.  - No other lesions of concern on areas examined.     Medications:  Current Outpatient Medications   Medication    ustekinumab (STELARA) 90 MG/ML     No current facility-administered medications for this visit.      Past Medical History:   Patient Active Problem List   Diagnosis    Crohn's disease (H)    ASCUS with positive high risk HPV cervical    Crohn's disease of small intestine (H)    Crohn's disease of colon with intestinal obstruction (H)    Painful menstruation    Multiple nevi    IUD (intrauterine device) in place    Elevated blood pressure reading without diagnosis of hypertension    SBO (small bowel obstruction) (H)    Crohn's  disease of both small and large intestine without complication (H)    Anemia, iron deficiency    Iron deficiency anemia, unspecified     Past Medical History:   Diagnosis Date    Abnormal Pap smear of cervix 2016    Autoimmune disease (H) 2012    Crohns    Crohn's disease (H) 04/08/2021    Hypertension 12/2020    Unsure it previously caused by steroid meds    SBO (small bowel obstruction) (H) 2020    Uncomplicated asthma     Used inhaler in childhood, no issues as adult        CC Referred Self, MD  No address on file on close of this encounter.

## 2023-08-22 NOTE — PATIENT INSTRUCTIONS

## 2023-09-11 ENCOUNTER — LAB (OUTPATIENT)
Dept: LAB | Facility: CLINIC | Age: 33
End: 2023-09-11
Payer: COMMERCIAL

## 2023-09-11 DIAGNOSIS — K50.90 CROHN'S DISEASE (H): ICD-10-CM

## 2023-09-11 LAB
FERRITIN SERPL-MCNC: 24 NG/ML (ref 6–175)
IRON BINDING CAPACITY (ROCHE): 300 UG/DL (ref 240–430)
IRON SATN MFR SERPL: 29 % (ref 15–46)
IRON SERPL-MCNC: 88 UG/DL (ref 37–145)

## 2023-09-11 PROCEDURE — 36415 COLL VENOUS BLD VENIPUNCTURE: CPT

## 2023-09-11 PROCEDURE — 82728 ASSAY OF FERRITIN: CPT

## 2023-09-11 PROCEDURE — 83550 IRON BINDING TEST: CPT

## 2023-09-11 PROCEDURE — 83540 ASSAY OF IRON: CPT

## 2023-10-03 ASSESSMENT — ENCOUNTER SYMPTOMS
EYE WATERING: 0
JAUNDICE: 0
BLOOD IN STOOL: 0
VOMITING: 0
EYE REDNESS: 0
DIARRHEA: 1
CONSTIPATION: 1
BOWEL INCONTINENCE: 0
EYE IRRITATION: 1
RECTAL PAIN: 0
HEARTBURN: 0
EYE PAIN: 0
DOUBLE VISION: 0
ABDOMINAL PAIN: 0
BLOATING: 1
NAUSEA: 0

## 2023-10-04 ENCOUNTER — OFFICE VISIT (OUTPATIENT)
Dept: GASTROENTEROLOGY | Facility: CLINIC | Age: 33
End: 2023-10-04
Attending: INTERNAL MEDICINE
Payer: COMMERCIAL

## 2023-10-04 VITALS
DIASTOLIC BLOOD PRESSURE: 85 MMHG | HEART RATE: 87 BPM | BODY MASS INDEX: 21.73 KG/M2 | SYSTOLIC BLOOD PRESSURE: 124 MMHG | OXYGEN SATURATION: 100 % | HEIGHT: 64 IN | WEIGHT: 127.3 LBS

## 2023-10-04 DIAGNOSIS — K50.90 CROHN'S DISEASE WITHOUT COMPLICATION, UNSPECIFIED GASTROINTESTINAL TRACT LOCATION (H): Primary | ICD-10-CM

## 2023-10-04 PROCEDURE — 99214 OFFICE O/P EST MOD 30 MIN: CPT | Performed by: PHYSICIAN ASSISTANT

## 2023-10-04 ASSESSMENT — PAIN SCALES - GENERAL: PAINLEVEL: NO PAIN (0)

## 2023-10-04 NOTE — NURSING NOTE
"Chief Complaint   Patient presents with    Follow Up       Vitals:    10/04/23 0954   BP: 124/85   Pulse: 87   SpO2: 100%   Weight: 57.7 kg (127 lb 4.8 oz)   Height: 1.626 m (5' 4\")       Body mass index is 21.85 kg/m .    [employer]    "

## 2023-10-04 NOTE — PROGRESS NOTES
IBD CLINIC VISIT     CC/REFERRING MD:  Dr. Akin Bernal  REASON FOR FOLLOW UP: Crohn's    ASSESSMENT/PLAN  33 year old female with Crohn's disease of the small bowel (ileum and likely jejunum)    1. Small bowel crohn's disease:    Current medication:    Ustekinumab every 8 weeks (started 5/2021)   Ustekinumab every 6 weeks (Spring 2022)  Current clinical disease activity: remission, HBI 0   Last endoscopic disease activity: 11/30/22: SES-CD: 0  Last radiographic disease assessment: MRE 1/17/22 with moderate segmental length of ileum (10cm approx) with diffuse wall thickening consistent with acute and chronic inflammation.    She continues to be in clinical remission at this time.  Colonoscopy 11/2022 without any detectable inflammation on ustekinumab every 6 weeks. Plan to continue this regimen for maintenance of remission.     She is pre contemplative for pregnancy. Discussed [place] registry. Will refer her to study team. Should she get pregnant in next year, will have her see in IPREP clinic again.     --Labs to include CBC, LFTs, CRP, ESR every 3 months.  -- Annual vitamin B12  -- Continue Stelara q6 weeks.     Colon cancer screening:  Given disease is limited to small bowel, colon cancer screening is recommended at age 45.     Misc:  -- Avoid tobacco use  -- Avoid NSAIDs as there is potentially a 25% chance of causing an IBD flare    RTC 6 months    NANCY PerezC  Division of Gastroenterology, Hepatology and Nutrition  [facility]     CROHN'S HISTORY:  Age at diagnosis: 2012  Extent of disease: small bowel   Disease phenotype: inflammatory  Chayito-anal disease: none  Prior IBD surgeries: none  Prior IBD Medications:    50 mg 6MP    DRUG MONITORING  TPMT enzyme activity: 40.4 (6/14/2012)    6-TGN/6-MMPN levels: --    Biologic concentration:  Usteinumab 2/2022 = 1.6, moved to every 6 weeks     HPI:   Here for follow-up.   Getting  in 2 weeks!!    Bowel pattern is 1 BM daily. Occasional  constipation and bloating.    Nutrition goal: tries to be gluten free.      HBI:  Overall patient well being (prior day): 0 (Very well)  Abdominal pain (prior day): 0 (None)  Number of liquid or soft stools (prior day): 0 (1 point per stool)  Abdominal mass on exam: 0 (None)  Complications (1 point for each):   None    Remission <5  Mild activity 5-7  Moderate activity 8-16  Severe > 16    Extra intestinal manifestations of IBD:  No uveitis/episcleritis  No aphthous ulcers   No arthritis   No erythema nodosum/pyoderma gangrenosum.       ROS:  Constitutional, HEENT, cardiovascular, pulmonary, GI, , musculoskeletal, neuro, skin, endocrine and psych systems are negative, except as otherwise noted.     PERTINENT PAST MEDICAL HISTORY:  Past Medical History:   Diagnosis Date    Abnormal Pap smear of cervix 2016    Autoimmune disease (H24) 2012    Crohns    Crohn's disease (H) 04/08/2021    Hypertension 12/2020    Unsure it previously caused by steroid meds    SBO (small bowel obstruction) (H) 2020    Uncomplicated asthma     Used inhaler in childhood, no issues as adult       PREVIOUS SURGERIES:  Past Surgical History:   Procedure Laterality Date    COLONOSCOPY      COLONOSCOPY N/A 11/30/2022    Procedure: COLONOSCOPY, WITH BIOPSY;  Surgeon: Shiv Jones MD;  Location: [facility] OR    EGD      RI ABLATE HEART DYSRHYTHM FOCUS  1993     ALLERGIES:     Allergies   Allergen Reactions    Other Environmental Allergy Itching and Visual Disturbance       PERTINENT MEDICATIONS:    Current Outpatient Medications:     ustekinumab (STELARA) 90 MG/ML, INJECT 1 ML (90 MG) UNDER THE SKIN EVERY 6 WEEKS., Disp: 1 mL, Rfl: 3    SOCIAL HISTORY:  Social History     Socioeconomic History    Marital status: Single     Spouse name: Not on file    Number of children: Not on file    Years of education: Not on file    Highest education level: Not on file   Occupational History    Not on file   Tobacco Use    Smoking status: Never     "Smokeless tobacco: Never   Substance and Sexual Activity    Alcohol use: Yes     Comment: occasional    Drug use: Never    Sexual activity: Yes     Partners: Male     Birth control/protection: Condom, None   Other Topics Concern    Parent/sibling w/ CABG, MI or angioplasty before 65F 55M? No   Social History Narrative    Not on file     Social Determinants of Health     Financial Resource Strain: Not on file   Food Insecurity: Not on file   Transportation Needs: Not on file   Physical Activity: Not on file   Stress: Not on file   Social Connections: Not on file   Interpersonal Safety: Not on file   Housing Stability: Not on file       FAMILY HISTORY:  Cousin has UC  Family History   Problem Relation Age of Onset    Other - See Comments Mother         Hysterectomy    Hypertension Father     Cerebrovascular Disease Maternal Grandmother 50    Colon Cancer Maternal Grandfather         60s    Melanoma Maternal Grandfather     Diabetes Type 1 Paternal Grandmother     Other - See Comments Paternal Grandmother         uterine prolapse    Heart Disease Paternal Grandfather 50        Heart attack    Hypertension Cousin        Past/family/social history reviewed and no changes    PHYSICAL EXAMINATION:  Constitutional: aaox3, cooperative, pleasant, not dyspneic/diaphoretic, no acute distress  Vitals reviewed: /85   Pulse 87   Ht 1.626 m (5' 4\")   Wt 57.7 kg (127 lb 4.8 oz)   SpO2 100%   BMI 21.85 kg/m    Wt:   Wt Readings from Last 2 Encounters:   10/04/23 57.7 kg (127 lb 4.8 oz)   03/27/23 58 kg (127 lb 14.4 oz)      Constitutional - general appearance is well and in no acute distress. Body habitus normal  Eyes - No redness or discharge  Respiratory - No cough, unlabored breathing  Musculoskeletal - range of motion intact: Neck and arms  Skin - No discoloration or lesions  Neurological - No tremors, headaches  Psychiatric - No anxiety, alert & oriented        Answers submitted by the patient for this " visit:  Symptoms you have experienced in the last 30 days (Submitted on 10/3/2023)  General Symptoms: No  Skin Symptoms: No  HENT Symptoms: No  EYE SYMPTOMS: Yes  HEART SYMPTOMS: No  LUNG SYMPTOMS: No  INTESTINAL SYMPTOMS: Yes  URINARY SYMPTOMS: No  GYNECOLOGIC SYMPTOMS: No  BREAST SYMPTOMS: No  SKELETAL SYMPTOMS: No  BLOOD SYMPTOMS: No  NERVOUS SYSTEM SYMPTOMS: No  MENTAL HEALTH SYMPTOMS: No  Please answer the questions below to tell us what conditions you are experiencing: (Submitted on 10/3/2023)  Eye pain: No  Vision loss: Yes  Dry eyes: No  Watery eyes: No  Eye bulging: No  Double vision: No  Flashing of lights: No  Spots: No  Floaters: Yes  Redness: No  Crossed eyes: No  Tunnel Vision: No  Yellowing of eyes: No  Eye irritation: Yes  Please answer the questions below to tell us what conditions you are experiencing: (Submitted on 10/3/2023)  Heart burn or indigestion: No  Nausea: No  Vomiting: No  Abdominal pain: No  Bloating: Yes  Constipation: Yes  Diarrhea: Yes  Blood in stool: No  Black stools: No  Rectal or Anal pain: No  Fecal incontinence: No  Yellowing of skin or eyes: No  Vomit with blood: No  Change in stools: No

## 2023-10-04 NOTE — LETTER
10/4/2023         RE: Marybel BRANDON Camron  [address] [place] [address]        Dear Colleague,    Thank you for referring your patient, Marybel Lyon, to the [facility] [place]. Please see a copy of my visit note below.    IBD CLINIC VISIT     CC/REFERRING MD:  Dr. Akin Bernal  REASON FOR FOLLOW UP: Crohn's    ASSESSMENT/PLAN  33 year old female with Crohn's disease of the small bowel (ileum and likely jejunum)    1. Small bowel crohn's disease:    Current medication:    Ustekinumab every 8 weeks (started 5/2021)   Ustekinumab every 6 weeks (Spring 2022)  Current clinical disease activity: remission, HBI 0   Last endoscopic disease activity: 11/30/22: SES-CD: 0  Last radiographic disease assessment: MRE 1/17/22 with moderate segmental length of ileum (10cm approx) with diffuse wall thickening consistent with acute and chronic inflammation.    She continues to be in clinical remission at this time.  Colonoscopy 11/2022 without any detectable inflammation on ustekinumab every 6 weeks. Plan to continue this regimen for maintenance of remission.     She is pre contemplative for pregnancy. Discussed [place] registry. Will refer her to study team. Should she get pregnant in next year, will have her see in [facility]REP clinic again.     --Labs to include CBC, LFTs, CRP, ESR every 3 months.  -- Annual vitamin B12  -- Continue Stelara q6 weeks.     Colon cancer screening:  Given disease is limited to small bowel, colon cancer screening is recommended at age 45.     Misc:  -- Avoid tobacco use  -- Avoid NSAIDs as there is potentially a 25% chance of causing an IBD flare    RTC 6 months    Shady Caceres PA-C  Division of Gastroenterology, Hepatology and Nutrition  [facility]     CROHN'S HISTORY:  Age at diagnosis: 2012  Extent of disease: small bowel   Disease phenotype: inflammatory  Chayito-anal disease: none  Prior IBD surgeries: none  Prior IBD Medications:    50 mg  6MP    DRUG MONITORING  TPMT enzyme activity: 40.4 (6/14/2012)    6-TGN/6-MMPN levels: --    Biologic concentration:  Usteinumab 2/2022 = 1.6, moved to every 6 weeks     HPI:   Here for follow-up.   Getting  in 2 weeks!!    Bowel pattern is 1 BM daily. Occasional constipation and bloating.    Nutrition goal: tries to be gluten free.      HBI:  Overall patient well being (prior day): 0 (Very well)  Abdominal pain (prior day): 0 (None)  Number of liquid or soft stools (prior day): 0 (1 point per stool)  Abdominal mass on exam: 0 (None)  Complications (1 point for each):   None    Remission <5  Mild activity 5-7  Moderate activity 8-16  Severe > 16    Extra intestinal manifestations of IBD:  No uveitis/episcleritis  No aphthous ulcers   No arthritis   No erythema nodosum/pyoderma gangrenosum.       ROS:  Constitutional, HEENT, cardiovascular, pulmonary, GI, , musculoskeletal, neuro, skin, endocrine and psych systems are negative, except as otherwise noted.     PERTINENT PAST MEDICAL HISTORY:  Past Medical History:   Diagnosis Date    Abnormal Pap smear of cervix 2016    Autoimmune disease (H24) 2012    Crohns    Crohn's disease (H) 04/08/2021    Hypertension 12/2020    Unsure it previously caused by steroid meds    SBO (small bowel obstruction) (H) 2020    Uncomplicated asthma     Used inhaler in childhood, no issues as adult       PREVIOUS SURGERIES:  Past Surgical History:   Procedure Laterality Date    COLONOSCOPY      COLONOSCOPY N/A 11/30/2022    Procedure: COLONOSCOPY, WITH BIOPSY;  Surgeon: Shiv Jones MD;  Location: [facility] OR    EGD      [place] ABLATE HEART DYSRHYTHM FOCUS  1993     ALLERGIES:     Allergies   Allergen Reactions    Other Environmental Allergy Itching and Visual Disturbance       PERTINENT MEDICATIONS:    Current Outpatient Medications:     ustekinumab (STELARA) 90 MG/ML, INJECT 1 ML (90 MG) UNDER THE SKIN EVERY 6 WEEKS., Disp: 1 mL, Rfl: 3    SOCIAL HISTORY:  Social History  "    Socioeconomic History    Marital status: Single     Spouse name: Not on file    Number of children: Not on file    Years of education: Not on file    Highest education level: Not on file   Occupational History    Not on file   Tobacco Use    Smoking status: Never    Smokeless tobacco: Never   Substance and Sexual Activity    Alcohol use: Yes     Comment: occasional    Drug use: Never    Sexual activity: Yes     Partners: Male     Birth control/protection: Condom, None   Other Topics Concern    Parent/sibling w/ CABG, MI or angioplasty before 65F 55M? No   Social History Narrative    Not on file     Social Determinants of Health     Financial Resource Strain: Not on file   Food Insecurity: Not on file   Transportation Needs: Not on file   Physical Activity: Not on file   Stress: Not on file   Social Connections: Not on file   Interpersonal Safety: Not on file   Housing Stability: Not on file       FAMILY HISTORY:  Cousin has UC  Family History   Problem Relation Age of Onset    Other - See Comments Mother         Hysterectomy    Hypertension Father     Cerebrovascular Disease Maternal Grandmother 50    Colon Cancer Maternal Grandfather         60s    Melanoma Maternal Grandfather     Diabetes Type 1 Paternal Grandmother     Other - See Comments Paternal Grandmother         uterine prolapse    Heart Disease Paternal Grandfather 50        Heart attack    Hypertension Cousin        Past/family/social history reviewed and no changes    PHYSICAL EXAMINATION:  Constitutional: aaox3, cooperative, pleasant, not dyspneic/diaphoretic, no acute distress  Vitals reviewed: /85   Pulse 87   Ht 1.626 m (5' 4\")   Wt 57.7 kg (127 lb 4.8 oz)   SpO2 100%   BMI 21.85 kg/m    Wt:   Wt Readings from Last 2 Encounters:   10/04/23 57.7 kg (127 lb 4.8 oz)   03/27/23 58 kg (127 lb 14.4 oz)      Constitutional - general appearance is well and in no acute distress. Body habitus normal  Eyes - No redness or discharge  Respiratory " - No cough, unlabored breathing  Musculoskeletal - range of motion intact: Neck and arms  Skin - No discoloration or lesions  Neurological - No tremors, headaches  Psychiatric - No anxiety, alert & oriented        Answers submitted by the patient for this visit:  Symptoms you have experienced in the last 30 days (Submitted on 10/3/2023)  General Symptoms: No  Skin Symptoms: No  HENT Symptoms: No  EYE SYMPTOMS: Yes  HEART SYMPTOMS: No  LUNG SYMPTOMS: No  INTESTINAL SYMPTOMS: Yes  URINARY SYMPTOMS: No  GYNECOLOGIC SYMPTOMS: No  BREAST SYMPTOMS: No  SKELETAL SYMPTOMS: No  BLOOD SYMPTOMS: No  NERVOUS SYSTEM SYMPTOMS: No  MENTAL HEALTH SYMPTOMS: No  Please answer the questions below to tell us what conditions you are experiencing: (Submitted on 10/3/2023)  Eye pain: No  Vision loss: Yes  Dry eyes: No  Watery eyes: No  Eye bulging: No  Double vision: No  Flashing of lights: No  Spots: No  Floaters: Yes  Redness: No  Crossed eyes: No  Tunnel Vision: No  Yellowing of eyes: No  Eye irritation: Yes  Please answer the questions below to tell us what conditions you are experiencing: (Submitted on 10/3/2023)  Heart burn or indigestion: No  Nausea: No  Vomiting: No  Abdominal pain: No  Bloating: Yes  Constipation: Yes  Diarrhea: Yes  Blood in stool: No  Black stools: No  Rectal or Anal pain: No  Fecal incontinence: No  Yellowing of skin or eyes: No  Vomit with blood: No  Change in stools: No      Again, thank you for allowing me to participate in the care of your patient.      Sincerely,    Shady Caceres PA-C

## 2023-10-04 NOTE — PATIENT INSTRUCTIONS
It was a pleasure taking care of you today.  I've included a brief summary of our discussion and care plan from today's visit below.  Please review this information with your primary care provider.  ______________________________________________________________________    My recommendations are summarized as follows:    -- Continue stelara every 6 weeks   -- Labs due now   -- Next endoscopic assessment: TBD  -- Patient with IBD we recommend supplementation vitamin D 1000 units daily and calcium 500 mg twice daily.  -- Vaccines/immunizations to be updated: flu shot  -- No NSAIDs (ibuprofen, or anything containing ibuprofen)    For additional resources about inflammatory bowel disease visit http://www.crohnscolitisfoundation.org/    To learn more about Diet and Nutrition in the setting of IBD, check out some of these resources:  https://www.crohnscolitisfoundation.org/diet-and-nutrition/what-should-i-eat  https://www.nimbal.org/  https://ntforibd.org/         Return to GI Clinic in 6 months to review your progress.    ______________________________________________________________________    How do I schedule labs, imaging studies, or procedures that were ordered in clinic today?     Labs: To schedule lab appointment at the [facility], use my chart or call [phone]. If you have a [facility] lab closer to home where you are regularly seen you can give them a call.     Procedures: If a colonoscopy, upper endoscopy, breath test, esophageal manometry, or pH impedence was ordered today, our endoscopy team will call you to schedule this. If you have not heard from our endoscopy team within a week, please call [phone] to schedule.     Imaging Studies: If you were scheduled for a CT scan, X-ray, MRI, ultrasound, HIDA scan or other imaging study, please call [phone] to have this scheduled.     Referral: If a referral to another specialty was ordered, expect a phone call or follow instructions above.  If you have not heard from anyone regarding your referral in a week, please call our clinic to check the status.     Who do I call with any questions after my visit?  Please be in touch if there are any further questions that arise following today's visit.  There are multiple ways to contact your gastroenterology care team.      During business hours, you may reach a Gastroenterology nurse at [phone]    To schedule or reschedule an appointment, please call [phone].     You can always send a secure message through [employer].  [employer] messages are answered by your nurse or doctor typically within 24 hours.  Please allow extra time on weekends and holidays.      For urgent/emergent questions after business hours, you may reach the on-call GI Fellow by contacting the [facility]  at [phone].     How will I get the results of any tests ordered?    You will receive all of your results.  If you have signed up for [employer], any tests ordered at your visit will be available to you after your physician reviews them.  Typically this takes 1-2 weeks.  If there are urgent results that require a change in your care plan, your physician or nurse will call you to discuss the next steps.      What is [employer]?  [employer] is a secure way for you to access all of your healthcare records from the [facility].  It is a web based computer program, so you can sign on to it from any location.  It also allows you to send secure messages to your care team.  I recommend signing up for [employer] access if you have not already done so and are comfortable with using a computer.         Sincerely,    Shady Caceres PA-C  [facility]  Division of Gastroenterology

## 2023-10-16 ENCOUNTER — MYC MEDICAL ADVICE (OUTPATIENT)
Dept: GASTROENTEROLOGY | Facility: CLINIC | Age: 33
End: 2023-10-16
Payer: COMMERCIAL

## 2023-10-23 NOTE — TELEPHONE ENCOUNTER
Called to remind patient of their upcoming appointment with our GI clinic, on Wednesday, Nov 1 at 9:20am with Dr. Earline Ponce. This appointment is scheduled as an in-person appt. Please arrive 15 minutes early to check in for your appointment. , if your appointment is virtual (video or telephone) you need to be in [place] for the visit. To reschedule or cancel patient to call [phone].    Destiny Guerrero

## 2023-10-31 ASSESSMENT — ENCOUNTER SYMPTOMS
EYE REDNESS: 0
DOUBLE VISION: 0
EYE IRRITATION: 0
EYE WATERING: 0
EYE PAIN: 0

## 2023-11-01 ENCOUNTER — OFFICE VISIT (OUTPATIENT)
Dept: GASTROENTEROLOGY | Facility: CLINIC | Age: 33
End: 2023-11-01
Payer: COMMERCIAL

## 2023-11-01 ENCOUNTER — LAB (OUTPATIENT)
Dept: LAB | Facility: CLINIC | Age: 33
End: 2023-11-01
Payer: COMMERCIAL

## 2023-11-01 VITALS
HEIGHT: 64 IN | DIASTOLIC BLOOD PRESSURE: 94 MMHG | BODY MASS INDEX: 21.63 KG/M2 | SYSTOLIC BLOOD PRESSURE: 134 MMHG | HEART RATE: 97 BPM | WEIGHT: 126.7 LBS

## 2023-11-01 DIAGNOSIS — K50.00 CROHN'S DISEASE OF SMALL INTESTINE (H): Primary | ICD-10-CM

## 2023-11-01 DIAGNOSIS — D84.9 IMMUNOSUPPRESSION (H): ICD-10-CM

## 2023-11-01 DIAGNOSIS — K50.00 CROHN'S DISEASE OF ILEUM WITHOUT COMPLICATION (H): Primary | ICD-10-CM

## 2023-11-01 DIAGNOSIS — K50.00 CROHN'S DISEASE OF SMALL INTESTINE (H): ICD-10-CM

## 2023-11-01 LAB
ALBUMIN SERPL BCG-MCNC: 4.1 G/DL (ref 3.5–5.2)
ALP SERPL-CCNC: 45 U/L (ref 35–104)
ALT SERPL W P-5'-P-CCNC: 13 U/L (ref 0–50)
ANION GAP SERPL CALCULATED.3IONS-SCNC: 8 MMOL/L (ref 7–15)
AST SERPL W P-5'-P-CCNC: 16 U/L (ref 0–45)
BASOPHILS # BLD AUTO: 0.1 10E3/UL (ref 0–0.2)
BASOPHILS NFR BLD AUTO: 1 %
BILIRUB DIRECT SERPL-MCNC: <0.2 MG/DL (ref 0–0.3)
BILIRUB SERPL-MCNC: <0.2 MG/DL
BUN SERPL-MCNC: 9.9 MG/DL (ref 6–20)
CALCIUM SERPL-MCNC: 9.5 MG/DL (ref 8.6–10)
CHLORIDE SERPL-SCNC: 104 MMOL/L (ref 98–107)
CREAT SERPL-MCNC: 0.57 MG/DL (ref 0.51–0.95)
CRP SERPL-MCNC: 3.12 MG/L
DEPRECATED HCO3 PLAS-SCNC: 28 MMOL/L (ref 22–29)
EGFRCR SERPLBLD CKD-EPI 2021: >90 ML/MIN/1.73M2
EOSINOPHIL # BLD AUTO: 0.5 10E3/UL (ref 0–0.7)
EOSINOPHIL NFR BLD AUTO: 6 %
ERYTHROCYTE [DISTWIDTH] IN BLOOD BY AUTOMATED COUNT: 11.8 % (ref 10–15)
ERYTHROCYTE [SEDIMENTATION RATE] IN BLOOD BY WESTERGREN METHOD: 9 MM/HR (ref 0–20)
GLUCOSE SERPL-MCNC: 87 MG/DL (ref 70–99)
HCT VFR BLD AUTO: 42.5 % (ref 35–47)
HGB BLD-MCNC: 13.8 G/DL (ref 11.7–15.7)
IMM GRANULOCYTES # BLD: 0 10E3/UL
IMM GRANULOCYTES NFR BLD: 0 %
LYMPHOCYTES # BLD AUTO: 2 10E3/UL (ref 0.8–5.3)
LYMPHOCYTES NFR BLD AUTO: 24 %
MCH RBC QN AUTO: 29.4 PG (ref 26.5–33)
MCHC RBC AUTO-ENTMCNC: 32.5 G/DL (ref 31.5–36.5)
MCV RBC AUTO: 90 FL (ref 78–100)
MONOCYTES # BLD AUTO: 1.1 10E3/UL (ref 0–1.3)
MONOCYTES NFR BLD AUTO: 13 %
NEUTROPHILS # BLD AUTO: 4.9 10E3/UL (ref 1.6–8.3)
NEUTROPHILS NFR BLD AUTO: 57 %
PLATELET # BLD AUTO: 367 10E3/UL (ref 150–450)
POTASSIUM SERPL-SCNC: 4.5 MMOL/L (ref 3.4–5.3)
PROT SERPL-MCNC: 7 G/DL (ref 6.4–8.3)
RBC # BLD AUTO: 4.7 10E6/UL (ref 3.8–5.2)
SODIUM SERPL-SCNC: 140 MMOL/L (ref 135–145)
VIT B12 SERPL-MCNC: 734 PG/ML (ref 232–1245)
WBC # BLD AUTO: 8.5 10E3/UL (ref 4–11)

## 2023-11-01 PROCEDURE — 83993 ASSAY FOR CALPROTECTIN FECAL: CPT

## 2023-11-01 PROCEDURE — 86140 C-REACTIVE PROTEIN: CPT

## 2023-11-01 PROCEDURE — 80053 COMPREHEN METABOLIC PANEL: CPT

## 2023-11-01 PROCEDURE — 85025 COMPLETE CBC W/AUTO DIFF WBC: CPT

## 2023-11-01 PROCEDURE — 82248 BILIRUBIN DIRECT: CPT

## 2023-11-01 PROCEDURE — 85652 RBC SED RATE AUTOMATED: CPT

## 2023-11-01 PROCEDURE — 82607 VITAMIN B-12: CPT

## 2023-11-01 PROCEDURE — 99215 OFFICE O/P EST HI 40 MIN: CPT | Performed by: INTERNAL MEDICINE

## 2023-11-01 PROCEDURE — 36415 COLL VENOUS BLD VENIPUNCTURE: CPT

## 2023-11-01 ASSESSMENT — PAIN SCALES - GENERAL: PAINLEVEL: NO PAIN (0)

## 2023-11-01 NOTE — LETTER
2023         RE: Marybel Juanalvarado  [address] [place] [address]        Dear Colleague,    Thank you for referring your patient, Marybel Lyon, to the [facility] [place]. Please see a copy of my visit note below.    IBD CLINIC VISIT     CC/REFERRING MD:  Dr. Akin Bernal  REASON FOR FOLLOW UP: Crohn's    ASSESSMENT/PLAN  33 year old female with Crohn's disease of the small bowel (ileum and likely jejunum)    1. Small bowel crohn's disease:    Current medication:    Ustekinumab every 8 weeks (started 2021)   Ustekinumab every 6 weeks (Spring 2022)  Current clinical disease activity: remission, HBI 0   Last endoscopic disease activity: 22: SES-CD: 0  Last radiographic disease assessment: MRE 22 with moderate segmental length of ileum (10cm approx) with diffuse wall thickening consistent with acute and chronic inflammation.    She continues to be in clinical remission at this time.  Colonoscopy 2022 without any detectable inflammation on ustekinumab every 6 weeks. Plan to continue this regimen for maintenance of remission.     She is thinking of conceiving around January.     PLAN  ---Check fecal calprotectin now (and every trimester and post partum)  ---Let us know when you're pregnant so we can adjust your Stelara schedule  ---Increased US surveillance of baby:   Fetal monitoring during pregnancy. [facility] will typically have frequent fetal monitoring during pregnancy.   Comprehensive ultrasound at 18 weeks  Growth ultrasounds every four weeks starting at 24 weeks (24, 28, 32, 36 weeks)  Weekly  testing at 32 weeks  Recommend delivery at 39 weeks   ---[facility] pharmacy referral for healthcare maintenance  --Rotavirus vaccine avoidance in baby        Colon cancer screening:  Given disease is limited to small bowel, colon cancer screening is recommended at age 45.     Misc:  -- Avoid tobacco use  -- Avoid NSAIDs as there is potentially a 25% chance  of causing an IBD flare    RTC 4 months    40 minutes spent on the date of the encounter doing chart review, history and exam, documentation and further activities as noted above       CROHN'S HISTORY:  Age at diagnosis: 2012  Extent of disease: small bowel   Disease phenotype: inflammatory  Chayito-anal disease: none  Prior IBD surgeries: none  Prior IBD Medications:    50 mg 6MP    DRUG MONITORING  TPMT enzyme activity: 40.4 (6/14/2012)    6-TGN/6-MMPN levels: --    Biologic concentration:  Usteinumab 2/2022 = 1.6, moved to every 6 weeks     HPI:   Here for follow-up.   Getting  in 2 weeks!!    Bowel pattern is 1 BM daily. Occasional constipation and bloating.    Nutrition goal: tries to be gluten free.      HBI:  Overall patient well being (prior day): 0 (Very well)  Abdominal pain (prior day): 0 (None)  Number of liquid or soft stools (prior day): 0 (1 point per stool)  Abdominal mass on exam: 0 (None)  Complications (1 point for each):   None    Remission <5  Mild activity 5-7  Moderate activity 8-16  Severe > 16    Extra intestinal manifestations of IBD:  No uveitis/episcleritis  No aphthous ulcers   No arthritis   No erythema nodosum/pyoderma gangrenosum.       Interval history, 11/2023  Had anemia shortly after having a 2 week period in the setting of an IUD. After IUD was removed, periods have normalized. Most recent Hgb in July 14.5.     HBI:  Overall patient well being (prior day): 0 (Very well)  Abdominal pain (prior day): 0 (None)  Number of liquid or soft stools (prior day): 0 (1 point per stool)  Abdominal mass on exam: 0 (None)  Complications (1 point for each): None    ROS:  Constitutional, HEENT, cardiovascular, pulmonary, GI, , musculoskeletal, neuro, skin, endocrine and psych systems are negative, except as otherwise noted.     PERTINENT PAST MEDICAL HISTORY:  Past Medical History:   Diagnosis Date    Abnormal Pap smear of cervix 2016    Autoimmune disease (H24) 2012    Crohns    Crohn's  disease (H) 04/08/2021    Hypertension 12/2020    Unsure it previously caused by steroid meds    SBO (small bowel obstruction) (H) 2020    Uncomplicated asthma     Used inhaler in childhood, no issues as adult       PREVIOUS SURGERIES:  Past Surgical History:   Procedure Laterality Date    COLONOSCOPY      COLONOSCOPY N/A 11/30/2022    Procedure: COLONOSCOPY, WITH BIOPSY;  Surgeon: Shiv Jones MD;  Location: [facility] OR    EGD      [place] ABLATE HEART DYSRHYTHM FOCUS  1993     ALLERGIES:     Allergies   Allergen Reactions    Other Environmental Allergy Itching and Visual Disturbance       PERTINENT MEDICATIONS:    Current Outpatient Medications:     ustekinumab (STELARA) 90 MG/ML, INJECT 1 ML (90 MG) UNDER THE SKIN EVERY 6 WEEKS., Disp: 1 mL, Rfl: 3    SOCIAL HISTORY:  Social History     Socioeconomic History    Marital status:      Spouse name: Not on file    Number of children: Not on file    Years of education: Not on file    Highest education level: Not on file   Occupational History    Not on file   Tobacco Use    Smoking status: Never    Smokeless tobacco: Never   Substance and Sexual Activity    Alcohol use: Yes     Comment: occasional    Drug use: Never    Sexual activity: Yes     Partners: Male     Birth control/protection: Condom, None   Other Topics Concern    Parent/sibling w/ CABG, MI or angioplasty before 65F 55M? No   Social History Narrative    Not on file     Social Determinants of Health     Financial Resource Strain: Not on file   Food Insecurity: Not on file   Transportation Needs: Not on file   Physical Activity: Not on file   Stress: Not on file   Social Connections: Not on file   Interpersonal Safety: Not on file   Housing Stability: Not on file       FAMILY HISTORY:  Cousin has UC  Family History   Problem Relation Age of Onset    Other - See Comments Mother         Hysterectomy    Hypertension Father     Cerebrovascular Disease Maternal Grandmother 50    Colon Cancer Maternal  "Grandfather         60s    Melanoma Maternal Grandfather     Diabetes Type 1 Paternal Grandmother     Other - See Comments Paternal Grandmother         uterine prolapse    Heart Disease Paternal Grandfather 50        Heart attack    Hypertension Cousin        Past/family/social history reviewed and no changes    PHYSICAL EXAMINATION:  Constitutional: aaox3, cooperative, pleasant, not dyspneic/diaphoretic, no acute distress  Vitals reviewed: BP (!) 134/94 (BP Location: Left arm, Patient Position: Sitting, Cuff Size: Adult Regular)   Pulse 97   Ht 1.626 m (5' 4\")   Wt 57.5 kg (126 lb 11.2 oz)   BMI 21.75 kg/m    Wt:   Wt Readings from Last 2 Encounters:   11/01/23 57.5 kg (126 lb 11.2 oz)   10/04/23 57.7 kg (127 lb 4.8 oz)      Constitutional - general appearance is well and in no acute distress. Body habitus normal  Eyes - No redness or discharge  Respiratory - No cough, unlabored breathing  Musculoskeletal - range of motion intact: Neck and arms  Skin - No discoloration or lesions  Neurological - No tremors, headaches  Psychiatric - No anxiety, alert & oriented      Again, thank you for allowing me to participate in the care of your patient.      Sincerely,    Earline Ponce MD  "

## 2023-11-01 NOTE — PROGRESS NOTES
IBD CLINIC VISIT     CC/REFERRING MD:  Dr. Akin Bernal  REASON FOR FOLLOW UP: Crohn's    ASSESSMENT/PLAN  33 year old female with Crohn's disease of the small bowel (ileum and likely jejunum)    1. Small bowel crohn's disease:    Current medication:    Ustekinumab every 8 weeks (started 2021)   Ustekinumab every 6 weeks (Spring 2022)  Current clinical disease activity: remission, HBI 0   Last endoscopic disease activity: 22: SES-CD: 0  Last radiographic disease assessment: MRE 22 with moderate segmental length of ileum (10cm approx) with diffuse wall thickening consistent with acute and chronic inflammation.    She continues to be in clinical remission at this time.  Colonoscopy 2022 without any detectable inflammation on ustekinumab every 6 weeks. Plan to continue this regimen for maintenance of remission.     She is thinking of conceiving around January.     PLAN  ---Check fecal calprotectin now (and every trimester and post partum)  ---Let us know when you're pregnant so we can adjust your Stelara schedule  ---Increased US surveillance of baby:   Fetal monitoring during pregnancy. [facility] will typically have frequent fetal monitoring during pregnancy.   Comprehensive ultrasound at 18 weeks  Growth ultrasounds every four weeks starting at 24 weeks (24, 28, 32, 36 weeks)  Weekly  testing at 32 weeks  Recommend delivery at 39 weeks   ---[facility] pharmacy referral for healthcare maintenance  --Rotavirus vaccine avoidance in baby        Colon cancer screening:  Given disease is limited to small bowel, colon cancer screening is recommended at age 45.     Misc:  -- Avoid tobacco use  -- Avoid NSAIDs as there is potentially a 25% chance of causing an IBD flare    RTC 4 months    40 minutes spent on the date of the encounter doing chart review, history and exam, documentation and further activities as noted above       CROHN'S HISTORY:  Age at diagnosis:   Extent of disease: small bowel   Disease  phenotype: inflammatory  Chayito-anal disease: none  Prior IBD surgeries: none  Prior IBD Medications:    50 mg 6MP    DRUG MONITORING  TPMT enzyme activity: 40.4 (6/14/2012)    6-TGN/6-MMPN levels: --    Biologic concentration:  Usteinumab 2/2022 = 1.6, moved to every 6 weeks     HPI:   Here for follow-up.   Getting  in 2 weeks!!    Bowel pattern is 1 BM daily. Occasional constipation and bloating.    Nutrition goal: tries to be gluten free.      HBI:  Overall patient well being (prior day): 0 (Very well)  Abdominal pain (prior day): 0 (None)  Number of liquid or soft stools (prior day): 0 (1 point per stool)  Abdominal mass on exam: 0 (None)  Complications (1 point for each):   None    Remission <5  Mild activity 5-7  Moderate activity 8-16  Severe > 16    Extra intestinal manifestations of IBD:  No uveitis/episcleritis  No aphthous ulcers   No arthritis   No erythema nodosum/pyoderma gangrenosum.       Interval history, 11/2023  Had anemia shortly after having a 2 week period in the setting of an IUD. After IUD was removed, periods have normalized. Most recent Hgb in July 14.5.     HBI:  Overall patient well being (prior day): 0 (Very well)  Abdominal pain (prior day): 0 (None)  Number of liquid or soft stools (prior day): 0 (1 point per stool)  Abdominal mass on exam: 0 (None)  Complications (1 point for each): None    ROS:  Constitutional, HEENT, cardiovascular, pulmonary, GI, , musculoskeletal, neuro, skin, endocrine and psych systems are negative, except as otherwise noted.     PERTINENT PAST MEDICAL HISTORY:  Past Medical History:   Diagnosis Date    Abnormal Pap smear of cervix 2016    Autoimmune disease (H24) 2012    Crohns    Crohn's disease (H) 04/08/2021    Hypertension 12/2020    Unsure it previously caused by steroid meds    SBO (small bowel obstruction) (H) 2020    Uncomplicated asthma     Used inhaler in childhood, no issues as adult       PREVIOUS SURGERIES:  Past Surgical History:    Procedure Laterality Date    COLONOSCOPY      COLONOSCOPY N/A 11/30/2022    Procedure: COLONOSCOPY, WITH BIOPSY;  Surgeon: Shiv Jones MD;  Location: [facility] OR    EGD      IN ABLATE HEART DYSRHYTHM FOCUS  1993     ALLERGIES:     Allergies   Allergen Reactions    Other Environmental Allergy Itching and Visual Disturbance       PERTINENT MEDICATIONS:    Current Outpatient Medications:     ustekinumab (STELARA) 90 MG/ML, INJECT 1 ML (90 MG) UNDER THE SKIN EVERY 6 WEEKS., Disp: 1 mL, Rfl: 3    SOCIAL HISTORY:  Social History     Socioeconomic History    Marital status:      Spouse name: Not on file    Number of children: Not on file    Years of education: Not on file    Highest education level: Not on file   Occupational History    Not on file   Tobacco Use    Smoking status: Never    Smokeless tobacco: Never   Substance and Sexual Activity    Alcohol use: Yes     Comment: occasional    Drug use: Never    Sexual activity: Yes     Partners: Male     Birth control/protection: Condom, None   Other Topics Concern    Parent/sibling w/ CABG, MI or angioplasty before 65F 55M? No   Social History Narrative    Not on file     Social Determinants of Health     Financial Resource Strain: Not on file   Food Insecurity: Not on file   Transportation Needs: Not on file   Physical Activity: Not on file   Stress: Not on file   Social Connections: Not on file   Interpersonal Safety: Not on file   Housing Stability: Not on file       FAMILY HISTORY:  Cousin has UC  Family History   Problem Relation Age of Onset    Other - See Comments Mother         Hysterectomy    Hypertension Father     Cerebrovascular Disease Maternal Grandmother 50    Colon Cancer Maternal Grandfather         60s    Melanoma Maternal Grandfather     Diabetes Type 1 Paternal Grandmother     Other - See Comments Paternal Grandmother         uterine prolapse    Heart Disease Paternal Grandfather 50        Heart attack    Hypertension Cousin   "      Past/family/social history reviewed and no changes    PHYSICAL EXAMINATION:  Constitutional: aaox3, cooperative, pleasant, not dyspneic/diaphoretic, no acute distress  Vitals reviewed: BP (!) 134/94 (BP Location: Left arm, Patient Position: Sitting, Cuff Size: Adult Regular)   Pulse 97   Ht 1.626 m (5' 4\")   Wt 57.5 kg (126 lb 11.2 oz)   BMI 21.75 kg/m    Wt:   Wt Readings from Last 2 Encounters:   11/01/23 57.5 kg (126 lb 11.2 oz)   10/04/23 57.7 kg (127 lb 4.8 oz)      Constitutional - general appearance is well and in no acute distress. Body habitus normal  Eyes - No redness or discharge  Respiratory - No cough, unlabored breathing  Musculoskeletal - range of motion intact: Neck and arms  Skin - No discoloration or lesions  Neurological - No tremors, headaches  Psychiatric - No anxiety, alert & oriented    "

## 2023-11-01 NOTE — PATIENT INSTRUCTIONS
PLAN  ---Check fecal calprotectin now (and every trimester and post partum)  ---Let us know when you're pregnant so we can adjust your Stelara schedule  ---Increased US surveillance of baby:   Fetal monitoring during pregnancy. [facility] will typically have frequent fetal monitoring during pregnancy.   Comprehensive ultrasound at 18 weeks  Growth ultrasounds every four weeks starting at 24 weeks (24, 28, 32, 36 weeks)  Weekly  testing at 32 weeks  Recommend delivery at 39 weeks   ---[facility] pharmacy referral for healthcare maintenance  --Rotavirus vaccine avoidance in baby

## 2023-11-01 NOTE — NURSING NOTE
"Chief Complaint   Patient presents with    Follow Up       Vitals:    11/01/23 0911   BP: (!) 134/94   BP Location: Left arm   Patient Position: Sitting   Cuff Size: Adult Regular   Pulse: 97   Weight: 57.5 kg (126 lb 11.2 oz)   Height: 1.626 m (5' 4\")       Body mass index is 21.75 kg/m .    Declined needing refill on Stelara.    Edwige Wilson LPN                      "

## 2023-11-03 ENCOUNTER — TELEPHONE (OUTPATIENT)
Dept: GASTROENTEROLOGY | Facility: CLINIC | Age: 33
End: 2023-11-03
Payer: COMMERCIAL

## 2023-11-03 LAB — CALPROTECTIN STL-MCNT: 75.2 MG/KG (ref 0–49.9)

## 2023-11-03 NOTE — TELEPHONE ENCOUNTER
MTM referral from: [facility] visit (referral by provider)    MTM referral outreach attempt #2 on November 3, 2023 at 9:18 AM      Outcome: Patient not reachable after several attempts, will route to MT Pharmacist/Provider as an FYI.  [facility] scheduling number is [phone].  Thank you for the referral.    Use hbc for the carrier/Plan on the flowsheet      [employer] Message Sent    Margaret Robbins CPhT  [facility]      Patient called back and scheduled visit.

## 2023-11-07 ENCOUNTER — VIRTUAL VISIT (OUTPATIENT)
Dept: GASTROENTEROLOGY | Facility: CLINIC | Age: 33
End: 2023-11-07
Attending: INTERNAL MEDICINE
Payer: COMMERCIAL

## 2023-11-07 DIAGNOSIS — K50.90 CROHN'S DISEASE (H): ICD-10-CM

## 2023-11-07 NOTE — PROGRESS NOTES
Medication Therapy Management (MTM) Encounter    ASSESSMENT:                            Medication Adherence/Access: No issues identified    Crohn's Disease:  Marybel would benefit from continued treatment with Stelara 90 mg every 6 weeks. They are up to date on routine maintenance labs. They are up to date on annual tuberculosis screening. No access issues for their advanced therapy are present. They are indicated for a few vaccinations which were recommended to them, with emphasis on pre-conception planning. Due to previously not tolerating ferrous fumarate in her prenatal multivitamin, I recommend an iron chelate supplement such as ferrous bisglycinate.      PLAN:                            Continue to take a prenatal vitamin. Some prenatal multivitamins contain a better tolerated form of iron such as ferrous bisglycinate. An example brand available for purchase online is [employer] prenatal.   Marybel will consider the following vaccines now:  Annual flu shot (inactivated)  Updated COVID-19 vaccine  Pneumococcal pneumonia (Prevnar-20)  During pregnancy:  Tdap  RSV (discuss with MFM provider)  Defer until after pregnancy:  Shingles vaccine (Shingrix 2-dose series)  AVOID live vaccines during pregnancy or during pre-conception without asking provider. Ask your MFM providers when it will be recommended for baby to receive live vaccines.     Follow-up: MTM Visit in 3-6 months    SUBJECTIVE/OBJECTIVE:                          Marybel Lyon is a 33 year old female contacted via secure video for an initial visit. She was referred to me from Earline Ponce MD.      Reason for visit: Stelara + IBD health maintenance (preconception).    Allergies/ADRs: Reviewed in chart  Past Medical History: Reviewed in chart  Tobacco: She reports that she has never smoked. She has never used smokeless tobacco.  Alcohol: Less than 1 beverages / week      Medication Adherence/Access: no issues reported    Crohn's Disease:    Ustekinumab 90 mg every 6 weeks    Gets Stelara through employer pharmacy benefits. Uses a calendar. No injection site reactions. No side effects. She is doing very well from a Crohn's perspective and her current symptoms (remission) are consistent with her visit with Dr. Ponce. She is thinking of conceiving around January.     Her main health maintenance concern was about the timing of vaccinations, including which ones she should get now and which ones she should wait until after pregnancy.     She recently started a multivitamin. She has previously tried [employer] brand prenatal but stopped due to digestive discomfort possibly related to iron. She is currently taking [employer] Gummy's. She asked whether it is okay that her current prenatal gummy contains folate rather than folic acid. Based on my research it appears that the [employer] gummy contain food-derived folate and do not have iron.      Last provider visit: 23 Earline Ponce MD  Next provider visit: 24 Earline Ponce MD  Last labs completed: 23  Lab frequency: every 3 months   - standing labs ESR, hepatic panel, CRP, CBC with platelets and difff, basic metabolic panel  24  Next labs due: 2024  PDC: 100%    IBD Health Care Maintenance:    Vaccinations:  All patients on biologics should avoid live vaccines.    -- Influenza (every year)- last   -- TdaP (every 10 years)- last   -- Pneumococcal Pneumonia    Prevnar-13: not on file   Pneumovax-23: not on file   Prevnar-20: not on file  -- RSV  -- COVID-19- 2021, 2021, 3/2021  -- Yearly assessment for latent Tb (verbal screening and exam, PPD or QuantiFERON-Tb testing)      result: negative 23    One time confirmation of immunity or serologies:  -- Hepatitis A (serologies or immunizations) not on file  -- Hepatitis B (serologies or immunizations)- serology indicates immunity   -- Varicella/Zoster    Varicella   Zoster- not on  "file, CDC recommends consider delaying until after pregnancy  -- MMR 8/23/1996, 3/30/1993 (meets criteria for presumptive evidence of immunity)  -- HPV (all aged 18-26)  -- Meningococcal meningitis (all patients at risk for meningitis)--     Due to the immunosuppression in this patient, I would not advise administration of live vaccines such as varicella/VZV, intranasal influenza, MMR, or yellow fever vaccine (if traveling).      Pre-Biologic Screening:  -- Hep B Surface Antibody positive serology indicates immunity 6/14/2012  -- Hep B Surface Antigen not on file  -- Hep B Core Antibody not on file  -- Hep C Antibody non-reactive 3/7/22    Bone mineral density screening   -- Recommend all patients supplement with calcium and vitamin D  -- Given prior steroid use recommend DEXA if not already done- last DEXA 3/18/22;    Cancer Screening:  Colon cancer screening:  Per Dr. Ponce 11/1/23: \"Given disease is limited to small bowel, colon cancer screening is recommended at age 45. \"    Cervical cancer screening: Per OBGYN-     Skin cancer screening: Annual visual exam of skin by dermatologist since patient is immunocompromised- 8/2023    Depression Screening:  -- Over the last month, have you felt down, depressed, or hopeless? No  -- Over the last month, have you felt little interest or pleasure doing things? No    Research:  Are you interested in being contacted about enrollment in clinical research studies? Yes    Would you like to receive a quarterly newsletter on research via email. NO      Misc:  -- Avoid tobacco use  -- Avoid NSAIDs as there is potentially a 25% chance of causing an IBD flare- acetaminophen (Tylenol),  avoids Aleve, Ibuprofen    ----------------      I spent 34 minutes with this patient today. All changes were made via collaborative practice agreement with Earline Ponce MD. A copy of the visit note was provided to the patient's provider(s).    A summary of these recommendations was given to the " patient.    Navneet Matthews, PharmD, BCPS  MTM Pharmacist   [facility]  Phone: [phone]    Telemedicine Visit Details  Type of service:  Video Conference via [employer]  Start Time:  3:04 PM  End Time:  3:38 PM     Medication Therapy Recommendations  No medication therapy recommendations to display

## 2023-11-07 NOTE — Clinical Note
2023         RE: Marybel Lyon  [address] [place] [address]        Dear Colleague,    Thank you for referring your patient, Marybel Lyon, to the [facility]. Please see a copy of my visit note below.    Medication Therapy Management (MTM) Encounter    ASSESSMENT:                            Medication Adherence/Access: {adherencechoices:247927}    Crohn's Disease:  ***      PLAN:                            Look into folic adcid in pregnancy - folate vs. Folic acid   Have you started a prenatal vitamin?  Marybel will consider the following vaccines:  Annual flu shot (inactivated)  Updated COVID-19 vaccine  Pneumococcal pneumonia (Prevnar-20)  During pregnancy:  Tdap  RSV (discuss with MFM)  Defer until after pregnancy:  Shingles vaccine (Shingrix 2-dose series)  Discuss routine cancer screenings  Follow-up: {followuptest2:651968}    SUBJECTIVE/OBJECTIVE:                          Marybel Lyon is a 33 year old female { :660276} for {mtmvisit:540929}     Reason for visit: Stelara + IBD health maintenance (preconception).    Allergies/ADRs: Reviewed in chart  Past Medical History: {/3/:441588}  Tobacco: She reports that she has never smoked. She has never used smokeless tobacco.  Alcohol: Less than 1 beverages / week  {Social and Goals:826650}    Medication Adherence/Access: {fumedadherence:739925}    Crohn's Disease:   Ustekinumab 90 mg every 6 weeks    Gets Stelara through employer pharmacy benefits. Uses a calendar. No injection site reactions. No side effects. Crohn's control is consistent with Dr. Ponce.      .    Last provider visit: 23 Earline Ponce MD  Next provider visit: 24 Earline Ponce MD  Last labs completed: 23  Lab frequency: every 3 months   - standing labs ESR, hepatic panel, CRP, CBC with platelets and difff, basic metabolic panel  24  Next labs due: 2024  PDC: 100%    IBD Health Care  "Maintenance:    Vaccinations:  All patients on biologics should avoid live vaccines.    -- Influenza (every year)- last 2022  -- TdaP (every 10 years)- last 2016  -- Pneumococcal Pneumonia    Prevnar-13: not on file   Pneumovax-23: not on file   Prevnar-20: not on file  -- RSV  -- COVID-19- 11/2021, 4/2021, 3/2021  -- Yearly assessment for latent Tb (verbal screening and exam, PPD or QuantiFERON-Tb testing)      result: negative 4/24/23    One time confirmation of immunity or serologies:  -- Hepatitis A (serologies or immunizations) not on file  -- Hepatitis B (serologies or immunizations)- serology indicates immunity 2012  -- Varicella/Zoster    Varicella   Zoster- not on file, CDC recommends consider delaying until after pregnancy  -- MMR 8/23/1996, 3/30/1993 (meets criteria for presumptive evidence of immunity)  -- HPV (all aged 18-26)  -- Meningococcal meningitis (all patients at risk for meningitis)--     Due to the immunosuppression in this patient, I would not advise administration of live vaccines such as varicella/VZV, intranasal influenza, MMR, or yellow fever vaccine (if traveling).      Pre-Biologic Screening:  -- Hep B Surface Antibody positive serology indicates immunity 6/14/2012  -- Hep B Surface Antigen not on file  -- Hep B Core Antibody not on file  -- Hep C Antibody non-reactive 3/7/22    Bone mineral density screening   -- Recommend all patients supplement with calcium and vitamin D  -- Given prior steroid use recommend DEXA if not already done- last DEXA 3/18/22    Cancer Screening:  Colon cancer screening:  Per Dr. Ponce 11/1/23: \"Given disease is limited to small bowel, colon cancer screening is recommended at age 45. \"    Cervical cancer screening: Per OBGYN-     Skin cancer screening: Annual visual exam of skin by dermatologist since patient is immunocompromised- 8/2023    Depression Screening:  -- Over the last month, have you felt down, depressed, or hopeless? No  -- Over the last month, " "have you felt little interest or pleasure doing things? No    Research:  Are you interested in being contacted about enrollment in clinical research studies? Yes    Would you like to receive a quarterly newsletter on research via email. NO      Misc:  -- Avoid tobacco use  -- Avoid NSAIDs as there is potentially a 25% chance of causing an IBD flare- acetaminophen (Tylenol), Aleve, Ibuprofen              Today's Vitals: There were no vitals taken for this visit.  ----------------  {LILLIAN?:156074}    I spent {mtm total time 3:801632} with this patient today. { :844574}. A copy of the visit note was provided to the patient's provider(s).    A summary of these recommendations {GIVEN/NOT GIVEN:565796}.    ***    Telemedicine Visit Details  Type of service:  {telemedvisitmtm:540229::\"Telephone visit\"}  Start Time:  3:04 PM  End Time:  3:38 PM     Medication Therapy Recommendations  No medication therapy recommendations to display       Medication Therapy Management (MTM) Encounter    ASSESSMENT:                            Medication Adherence/Access: No issues identified    Crohn's Disease:  Marybel would benefit from continued treatment with Stelara 90 mg every 6 weeks. They are up to date on routine maintenance labs. They are up to date on annual tuberculosis screening. No access issues for their advanced therapy are present. They are indicated for a few vaccinations which were recommended to them, with emphasis on pre-conception planning. Due to previously not tolerating ferrous fumarate in her prenatal multivitamin, I recommend an iron chelate supplement such as ferrous bisglycinate.      PLAN:                            Continue to take a prenatal vitamin. Some prenatal multivitamins contain a better tolerated form of iron such as ferrous bisglycinate. An example brand available for purchase online is Biju basic prenatal.   Marybel will consider the following vaccines now:  Annual flu shot (inactivated)  Updated " COVID-19 vaccine  Pneumococcal pneumonia (Prevnar-20)  During pregnancy:  Tdap  RSV (discuss with M provider)  Defer until after pregnancy:  Shingles vaccine (Shingrix 2-dose series)  AVOID live vaccines during pregnancy or during pre-conception without asking provider. Ask your [facility] providers when it will be recommended for baby to receive live vaccines.     Follow-up: [facility] Visit in 3-6 months    SUBJECTIVE/OBJECTIVE:                          Marybel Lyon is a 33 year old female contacted via secure video for an initial visit. She was referred to me from Earline Ponce MD.      Reason for visit: Stelara + IBD health maintenance (preconception).    Allergies/ADRs: Reviewed in chart  Past Medical History: Reviewed in chart  Tobacco: She reports that she has never smoked. She has never used smokeless tobacco.  Alcohol: Less than 1 beverages / week      Medication Adherence/Access: no issues reported    Crohn's Disease:   Ustekinumab 90 mg every 6 weeks    Gets Stelara through employer pharmacy benefits. Uses a calendar. No injection site reactions. No side effects. She is doing very well from a Crohn's perspective and her current symptoms (remission) are consistent with her visit with Dr. Ponce. She is thinking of conceiving around January.     Her main health maintenance concern was about the timing of vaccinations, including which ones she should get now and which ones she should wait until after pregnancy.     She recently started a multivitamin. She has previously tried [employer] brand prenatal but stopped due to digestive discomfort possibly related to iron. She is currently taking [employer] MyKind Gummy's. She asked whether it is okay that her current prenatal gummy contains folate rather than folic acid. Based on my research it appears that the [employer] MyKind gummy contain food-derived folate and do not have iron.      Last provider visit: 11/1/23 Earline Ponce MD  Next provider visit:  "24 Earline Ponce MD  Last labs completed: 23  Lab frequency: every 3 months   - standing labs ESR, hepatic panel, CRP, CBC with platelets and difff, basic metabolic panel  24  Next labs due: 2024  PDC: 100%    IBD Health Care Maintenance:    Vaccinations:  All patients on biologics should avoid live vaccines.    -- Influenza (every year)- last   -- TdaP (every 10 years)- last   -- Pneumococcal Pneumonia    Prevnar-13: not on file   Pneumovax-23: not on file   Prevnar-20: not on file  -- RSV  -- COVID-19- 2021, 2021, 3/2021  -- Yearly assessment for latent Tb (verbal screening and exam, PPD or QuantiFERON-Tb testing)      result: negative 23    One time confirmation of immunity or serologies:  -- Hepatitis A (serologies or immunizations) not on file  -- Hepatitis B (serologies or immunizations)- serology indicates immunity   -- Varicella/Zoster    Varicella   Zoster- not on file, CDC recommends consider delaying until after pregnancy  -- MMR 1996, 3/30/1993 (meets criteria for presumptive evidence of immunity)  -- HPV (all aged 18-26)  -- Meningococcal meningitis (all patients at risk for meningitis)--     Due to the immunosuppression in this patient, I would not advise administration of live vaccines such as varicella/VZV, intranasal influenza, MMR, or yellow fever vaccine (if traveling).      Pre-Biologic Screening:  -- Hep B Surface Antibody positive serology indicates immunity 2012  -- Hep B Surface Antigen not on file  -- Hep B Core Antibody not on file  -- Hep C Antibody non-reactive 3/7/22    Bone mineral density screening   -- Recommend all patients supplement with calcium and vitamin D  -- Given prior steroid use recommend DEXA if not already done- last DEXA 3/18/22;    Cancer Screening:  Colon cancer screening:  Per Dr. Ponce 23: \"Given disease is limited to small bowel, colon cancer screening is recommended at age 45. \"    Cervical cancer " screening: Per OBGYN-     Skin cancer screening: Annual visual exam of skin by dermatologist since patient is immunocompromised- 8/2023    Depression Screening:  -- Over the last month, have you felt down, depressed, or hopeless? No  -- Over the last month, have you felt little interest or pleasure doing things? No    Research:  Are you interested in being contacted about enrollment in clinical research studies? Yes    Would you like to receive a quarterly newsletter on research via email. NO      Misc:  -- Avoid tobacco use  -- Avoid NSAIDs as there is potentially a 25% chance of causing an IBD flare- acetaminophen (Tylenol),  avoids Aleve, Ibuprofen    ----------------      I spent 34 minutes with this patient today. All changes were made via collaborative practice agreement with Earline Ponce MD. A copy of the visit note was provided to the patient's provider(s).    A summary of these recommendations was given to the patient.    Navneet Matthews PharmD, BCPS  MT Pharmacist   [facility]  Phone: [phone]    Telemedicine Visit Details  Type of service:  Video Conference via [employer]  Start Time:  3:04 PM  End Time:  3:38 PM     Medication Therapy Recommendations  No medication therapy recommendations to display         Again, thank you for allowing me to participate in the care of your patient.        Sincerely,        Navneet Matthews RPH

## 2023-11-09 RX ORDER — USTEKINUMAB 90 MG/ML
INJECTION, SOLUTION SUBCUTANEOUS
Qty: 1 ML | Refills: 3 | Status: SHIPPED | OUTPATIENT
Start: 2023-11-09 | End: 2024-05-28

## 2023-11-09 RX ORDER — USTEKINUMAB 90 MG/ML
INJECTION, SOLUTION SUBCUTANEOUS
Qty: 1 ML | Refills: 3 | OUTPATIENT
Start: 2023-11-09 | End: 2023-11-09

## 2023-11-09 NOTE — TELEPHONE ENCOUNTER
ustekinumab (STELARA) 90 MG/ML 1 mL 3 6/8/2023     Last Office Visit: 11/1/23  Future Office visit:  2/27/23    CBC RESULTS:   Recent Labs   Lab Test 11/01/23  1403   WBC 8.5   RBC 4.70   HGB 13.8   HCT 42.5   MCV 90   MCH 29.4   MCHC 32.5   RDW 11.8          Creatinine   Date Value Ref Range Status   11/01/2023 0.57 0.51 - 0.95 mg/dL Final   ]    Liver Function Studies -   Recent Labs   Lab Test 11/01/23  1403   PROTTOTAL 7.0   ALBUMIN 4.1   BILITOTAL <0.2   ALKPHOS 45   AST 16   ALT 13

## 2023-11-13 NOTE — PATIENT INSTRUCTIONS
"Recommendations from today's MTM visit:                                                      Continue to take a prenatal vitamin. Some prenatal multivitamins contain a better tolerated form of iron such as ferrous bisglycinate. An example brand available for purchase online is Biju basic prenatal.   Marybel will consider the following vaccines:  Annual flu shot (inactivated)  Updated COVID-19 vaccine  Pneumococcal pneumonia (Prevnar-20)  During pregnancy:  Tdap  RSV (discuss with MFM provider)  Defer until after pregnancy:  Shingles vaccine (Shingrix 2-dose series)  AVOID live vaccines during pregnancy. Ask your MFM providers when it will be recommended for baby to receive live vaccines.     Follow-up: MTM Visit in 3-6 months    It was great speaking with you today.  I value your experience and would be very thankful for your time in providing feedback in our clinic survey. In the next few days, you may receive an email or text message from [employer] with a link to a survey related to your  clinical pharmacist.\"     To schedule another MTM appointment, please call the clinic directly or you may call the MTM scheduling line at [phone] or toll-free at [phone].     My Clinical Pharmacist's contact information:                                                      Please feel free to contact me with any questions or concerns you have.      Navneet Matthews, PharmD, BCPS  MTM Pharmacist   [facility]  Phone: [phone]   "

## 2023-12-05 DIAGNOSIS — K50.90 CROHN'S DISEASE (H): Primary | ICD-10-CM

## 2023-12-05 NOTE — PROGRESS NOTES
Hepatitis B screening panel ordered.  Vijay Matthews, PharmD, BCPS  MTM Pharmacist   [facility]  Phone: [phone]

## 2023-12-23 ENCOUNTER — HEALTH MAINTENANCE LETTER (OUTPATIENT)
Age: 33
End: 2023-12-23

## 2024-02-27 ENCOUNTER — OFFICE VISIT (OUTPATIENT)
Dept: GASTROENTEROLOGY | Facility: CLINIC | Age: 34
End: 2024-02-27
Attending: INTERNAL MEDICINE
Payer: COMMERCIAL

## 2024-02-27 VITALS
HEART RATE: 88 BPM | WEIGHT: 135.9 LBS | BODY MASS INDEX: 23.2 KG/M2 | DIASTOLIC BLOOD PRESSURE: 93 MMHG | SYSTOLIC BLOOD PRESSURE: 135 MMHG | OXYGEN SATURATION: 99 % | HEIGHT: 64 IN

## 2024-02-27 DIAGNOSIS — K50.00 CROHN'S DISEASE OF ILEUM WITHOUT COMPLICATION (H): Primary | ICD-10-CM

## 2024-02-27 PROCEDURE — 99215 OFFICE O/P EST HI 40 MIN: CPT | Performed by: INTERNAL MEDICINE

## 2024-02-27 ASSESSMENT — PAIN SCALES - GENERAL: PAINLEVEL: NO PAIN (0)

## 2024-02-27 NOTE — PROGRESS NOTES
IBD CLINIC VISIT     CC/REFERRING MD:  Dr. Akin Bernal  REASON FOR FOLLOW UP: Crohn's    ASSESSMENT/PLAN  33 year old female with Crohn's disease of the small bowel (ileum and likely jejunum)    1. Small bowel crohn's disease:    Current medication:    Ustekinumab every 8 weeks (started 5/2021)   Ustekinumab every 6 weeks (Spring 2022)  Current clinical disease activity: remission, HBI 0   Last endoscopic disease activity: 11/30/22: SES-CD: 0  Last radiographic disease assessment: MRE 1/17/22 with moderate segmental length of ileum (10cm approx) with diffuse wall thickening consistent with acute and chronic inflammation.    She continues to be in clinical remission at this time.  Colonoscopy 11/2022 without any detectable inflammation on ustekinumab every 6 weeks. Plan to continue this regimen for maintenance of remission.     She is thinking of conceiving around January.     PLAN  --Continue Stelara every 6 weeks  --------------monitoring labs every 3 months  ---Let us know when you're pregnant so we can adjust your Stelara schedule    Colon cancer screening:  Given disease is limited to small bowel, colon cancer screening is recommended at age 45.     Misc:  -- Avoid tobacco use  -- Avoid NSAIDs as there is potentially a 25% chance of causing an IBD flare    RTC 6 months    40 minutes spent on the date of the encounter doing chart review, history and exam, documentation and further activities as noted above       CROHN'S HISTORY:  Age at diagnosis: 2012  Extent of disease: small bowel   Disease phenotype: inflammatory  Chayito-anal disease: none  Prior IBD surgeries: none  Prior IBD Medications:    50 mg 6MP    DRUG MONITORING  TPMT enzyme activity: 40.4 (6/14/2012)    6-TGN/6-MMPN levels: --    Biologic concentration:  Usteinumab 2/2022 = 1.6, moved to every 6 weeks     HPI:   Here for follow-up.   Getting  in 2 weeks!!    Bowel pattern is 1 BM daily. Occasional constipation and bloating.    Nutrition goal:  tries to be gluten free.      HBI:  Overall patient well being (prior day): 0 (Very well)  Abdominal pain (prior day): 0 (None)  Number of liquid or soft stools (prior day): 0 (1 point per stool)  Abdominal mass on exam: 0 (None)  Complications (1 point for each):   None    Remission <5  Mild activity 5-7  Moderate activity 8-16  Severe > 16    Extra intestinal manifestations of IBD:  No uveitis/episcleritis  No aphthous ulcers   No arthritis   No erythema nodosum/pyoderma gangrenosum.       Interval history, 11/2023  Had anemia shortly after having a 2 week period in the setting of an IUD. After IUD was removed, periods have normalized. Most recent Hgb in July 14.5.     HBI:  Overall patient well being (prior day): 0 (Very well)  Abdominal pain (prior day): 0 (None)  Number of liquid or soft stools (prior day): 0 (1 point per stool)  Abdominal mass on exam: 0 (None)  Complications (1 point for each): None    Interval history, 2/2024  Feeling well today. No GI complaints. No waning effect on Stelara q6w.     Going to [place] next month.    HBI:  Overall patient well being (prior day): 0 (Very well)  Abdominal pain (prior day): 0 (None)  Number of liquid or soft stools (prior day): 0 (1 point per stool)  Abdominal mass on exam: 0 (None)  Complications (1 point for each):   None      ROS:  Constitutional, HEENT, cardiovascular, pulmonary, GI, , musculoskeletal, neuro, skin, endocrine and psych systems are negative, except as otherwise noted.     PERTINENT PAST MEDICAL HISTORY:  Past Medical History:   Diagnosis Date    Abnormal Pap smear of cervix 2016    Autoimmune disease (H24) 2012    Crohns    Crohn's disease (H) 04/08/2021    Hypertension 12/2020    Unsure it previously caused by steroid meds    SBO (small bowel obstruction) (H) 2020    Uncomplicated asthma     Used inhaler in childhood, no issues as adult       PREVIOUS SURGERIES:  Past Surgical History:   Procedure Laterality Date    COLONOSCOPY       COLONOSCOPY N/A 11/30/2022    Procedure: COLONOSCOPY, WITH BIOPSY;  Surgeon: Shiv Jones MD;  Location: [facility] OR    EGD      HI ABLATE HEART DYSRHYTHM FOCUS  1993     ALLERGIES:     Allergies   Allergen Reactions    Other Environmental Allergy Itching and Visual Disturbance       PERTINENT MEDICATIONS:    Current Outpatient Medications:     Prenatal Vit-Fe Fumarate-FA (PRENATAL MULTIVITAMIN  PLUS IRON) 27-1 MG TABS, Take 1 tablet by mouth daily Ask patient what brand using, Disp: , Rfl:     ustekinumab (STELARA) 90 MG/ML, INJECT 1 ML (90 MG) UNDER THE SKIN EVERY 6 WEEKS., Disp: 1 mL, Rfl: 3    SOCIAL HISTORY:  Social History     Socioeconomic History    Marital status:      Spouse name: Not on file    Number of children: Not on file    Years of education: Not on file    Highest education level: Not on file   Occupational History    Not on file   Tobacco Use    Smoking status: Never    Smokeless tobacco: Never   Substance and Sexual Activity    Alcohol use: Yes     Comment: occasional    Drug use: Never    Sexual activity: Yes     Partners: Male     Birth control/protection: Condom, None   Other Topics Concern    Parent/sibling w/ CABG, MI or angioplasty before 65F 55M? No   Social History Narrative    Not on file     Social Determinants of Health     Financial Resource Strain: Not on file   Food Insecurity: Not on file   Transportation Needs: Not on file   Physical Activity: Not on file   Stress: Not on file   Social Connections: Not on file   Interpersonal Safety: Not on file   Housing Stability: Not on file       FAMILY HISTORY:  Cousin has UC  Family History   Problem Relation Age of Onset    Other - See Comments Mother         Hysterectomy    Hypertension Father     Cerebrovascular Disease Maternal Grandmother 50    Colon Cancer Maternal Grandfather         60s    Melanoma Maternal Grandfather     Diabetes Type 1 Paternal Grandmother     Other - See Comments Paternal Grandmother         uterine  "prolapse    Heart Disease Paternal Grandfather 50        Heart attack    Hypertension Cousin        Past/family/social history reviewed and no changes    PHYSICAL EXAMINATION:  Constitutional: aaox3, cooperative, pleasant, not dyspneic/diaphoretic, no acute distress  Vitals reviewed: BP (!) 135/93   Pulse 88   Ht 1.626 m (5' 4\")   Wt 61.6 kg (135 lb 14.4 oz)   SpO2 99%   BMI 23.33 kg/m    Wt:   Wt Readings from Last 2 Encounters:   02/27/24 61.6 kg (135 lb 14.4 oz)   11/01/23 57.5 kg (126 lb 11.2 oz)      Constitutional - general appearance is well and in no acute distress. Body habitus normal  Eyes - No redness or discharge  Respiratory - No cough, unlabored breathing  Musculoskeletal - range of motion intact: Neck and arms  Skin - No discoloration or lesions  Neurological - No tremors, headaches  Psychiatric - No anxiety, alert & oriented    "

## 2024-02-27 NOTE — PATIENT INSTRUCTIONS
PLAN  --Continue Stelara every 6 weeks  --------------monitoring labs every 3 months  --Let us know when you get pregnant!

## 2024-02-27 NOTE — LETTER
2/27/2024         RE: Marybel Juanalvarado  [address] [place] [address]        Dear Colleague,    Thank you for referring your patient, Marybel Lyon, to the [facility] [place]. Please see a copy of my visit note below.    IBD CLINIC VISIT     CC/REFERRING MD:  Dr. Akin Bernal  REASON FOR FOLLOW UP: Crohn's    ASSESSMENT/PLAN  33 year old female with Crohn's disease of the small bowel (ileum and likely jejunum)    1. Small bowel crohn's disease:    Current medication:    Ustekinumab every 8 weeks (started 5/2021)   Ustekinumab every 6 weeks (Spring 2022)  Current clinical disease activity: remission, HBI 0   Last endoscopic disease activity: 11/30/22: SES-CD: 0  Last radiographic disease assessment: MRE 1/17/22 with moderate segmental length of ileum (10cm approx) with diffuse wall thickening consistent with acute and chronic inflammation.    She continues to be in clinical remission at this time.  Colonoscopy 11/2022 without any detectable inflammation on ustekinumab every 6 weeks. Plan to continue this regimen for maintenance of remission.     She is thinking of conceiving around January.     PLAN  --Continue Stelara every 6 weeks  --------------monitoring labs every 3 months  ---Let us know when you're pregnant so we can adjust your Stelara schedule    Colon cancer screening:  Given disease is limited to small bowel, colon cancer screening is recommended at age 45.     Misc:  -- Avoid tobacco use  -- Avoid NSAIDs as there is potentially a 25% chance of causing an IBD flare    RTC 6 months    40 minutes spent on the date of the encounter doing chart review, history and exam, documentation and further activities as noted above       CROHN'S HISTORY:  Age at diagnosis: 2011  Extent of disease: small bowel   Disease phenotype: inflammatory  Chayito-anal disease: none  Prior IBD surgeries: none  Prior IBD Medications:    50 mg 6MP    DRUG MONITORING  TPMT enzyme  activity: 40.4 (6/14/2012)    6-TGN/6-MMPN levels: --    Biologic concentration:  Usteinumab 2/2022 = 1.6, moved to every 6 weeks     HPI:   Here for follow-up.   Getting  in 2 weeks!!    Bowel pattern is 1 BM daily. Occasional constipation and bloating.    Nutrition goal: tries to be gluten free.      HBI:  Overall patient well being (prior day): 0 (Very well)  Abdominal pain (prior day): 0 (None)  Number of liquid or soft stools (prior day): 0 (1 point per stool)  Abdominal mass on exam: 0 (None)  Complications (1 point for each):   None    Remission <5  Mild activity 5-7  Moderate activity 8-16  Severe > 16    Extra intestinal manifestations of IBD:  No uveitis/episcleritis  No aphthous ulcers   No arthritis   No erythema nodosum/pyoderma gangrenosum.       Interval history, 11/2023  Had anemia shortly after having a 2 week period in the setting of an IUD. After IUD was removed, periods have normalized. Most recent Hgb in July 14.5.     HBI:  Overall patient well being (prior day): 0 (Very well)  Abdominal pain (prior day): 0 (None)  Number of liquid or soft stools (prior day): 0 (1 point per stool)  Abdominal mass on exam: 0 (None)  Complications (1 point for each): None    Interval history, 2/2024  Feeling well today. No GI complaints. No waning effect on Stelara q6w.     Going to [place] next month.    HBI:  Overall patient well being (prior day): 0 (Very well)  Abdominal pain (prior day): 0 (None)  Number of liquid or soft stools (prior day): 0 (1 point per stool)  Abdominal mass on exam: 0 (None)  Complications (1 point for each):   None      ROS:  Constitutional, HEENT, cardiovascular, pulmonary, GI, , musculoskeletal, neuro, skin, endocrine and psych systems are negative, except as otherwise noted.     PERTINENT PAST MEDICAL HISTORY:  Past Medical History:   Diagnosis Date    Abnormal Pap smear of cervix 2016    Autoimmune disease (H24) 2012    Crohns    Crohn's disease (H) 04/08/2021     Hypertension 12/2020    Unsure it previously caused by steroid meds    SBO (small bowel obstruction) (H) 2020    Uncomplicated asthma     Used inhaler in childhood, no issues as adult       PREVIOUS SURGERIES:  Past Surgical History:   Procedure Laterality Date    COLONOSCOPY      COLONOSCOPY N/A 11/30/2022    Procedure: COLONOSCOPY, WITH BIOPSY;  Surgeon: Shiv Jones MD;  Location: [facility] OR    EGD      ID ABLATE HEART DYSRHYTHM FOCUS  1993     ALLERGIES:     Allergies   Allergen Reactions    Other Environmental Allergy Itching and Visual Disturbance       PERTINENT MEDICATIONS:    Current Outpatient Medications:     Prenatal Vit-Fe Fumarate-FA (PRENATAL MULTIVITAMIN  PLUS IRON) 27-1 MG TABS, Take 1 tablet by mouth daily Ask patient what brand using, Disp: , Rfl:     ustekinumab (STELARA) 90 MG/ML, INJECT 1 ML (90 MG) UNDER THE SKIN EVERY 6 WEEKS., Disp: 1 mL, Rfl: 3    SOCIAL HISTORY:  Social History     Socioeconomic History    Marital status:      Spouse name: Not on file    Number of children: Not on file    Years of education: Not on file    Highest education level: Not on file   Occupational History    Not on file   Tobacco Use    Smoking status: Never    Smokeless tobacco: Never   Substance and Sexual Activity    Alcohol use: Yes     Comment: occasional    Drug use: Never    Sexual activity: Yes     Partners: Male     Birth control/protection: Condom, None   Other Topics Concern    Parent/sibling w/ CABG, MI or angioplasty before 65F 55M? No   Social History Narrative    Not on file     Social Determinants of Health     Financial Resource Strain: Not on file   Food Insecurity: Not on file   Transportation Needs: Not on file   Physical Activity: Not on file   Stress: Not on file   Social Connections: Not on file   Interpersonal Safety: Not on file   Housing Stability: Not on file       FAMILY HISTORY:  Cousin has UC  Family History   Problem Relation Age of Onset    Other - See Comments Mother   "       Hysterectomy    Hypertension Father     Cerebrovascular Disease Maternal Grandmother 50    Colon Cancer Maternal Grandfather         60s    Melanoma Maternal Grandfather     Diabetes Type 1 Paternal Grandmother     Other - See Comments Paternal Grandmother         uterine prolapse    Heart Disease Paternal Grandfather 50        Heart attack    Hypertension Cousin        Past/family/social history reviewed and no changes    PHYSICAL EXAMINATION:  Constitutional: aaox3, cooperative, pleasant, not dyspneic/diaphoretic, no acute distress  Vitals reviewed: BP (!) 135/93   Pulse 88   Ht 1.626 m (5' 4\")   Wt 61.6 kg (135 lb 14.4 oz)   SpO2 99%   BMI 23.33 kg/m    Wt:   Wt Readings from Last 2 Encounters:   02/27/24 61.6 kg (135 lb 14.4 oz)   11/01/23 57.5 kg (126 lb 11.2 oz)      Constitutional - general appearance is well and in no acute distress. Body habitus normal  Eyes - No redness or discharge  Respiratory - No cough, unlabored breathing  Musculoskeletal - range of motion intact: Neck and arms  Skin - No discoloration or lesions  Neurological - No tremors, headaches  Psychiatric - No anxiety, alert & oriented      Again, thank you for allowing me to participate in the care of your patient.      Sincerely,    Earline Ponce MD  "

## 2024-02-27 NOTE — NURSING NOTE
"Chief Complaint   Patient presents with    Follow Up       Vitals:    02/27/24 1300   BP: (!) 135/93   Pulse: 88   SpO2: 99%   Weight: 61.6 kg (135 lb 14.4 oz)   Height: 1.626 m (5' 4\")       Body mass index is 23.33 kg/m .    [employer]    "

## 2024-02-29 ENCOUNTER — TELEPHONE (OUTPATIENT)
Dept: GASTROENTEROLOGY | Facility: CLINIC | Age: 34
End: 2024-02-29
Payer: COMMERCIAL

## 2024-02-29 NOTE — TELEPHONE ENCOUNTER
Left Voicemail (1st Attempt) and Sent Mychart (1st Attempt) for the patient to call back and schedule the following:    Appointment type: Return IBD  Provider: Dr Ponce  Return date: 08/27/24   Specialty phone number: [phone]  Additional appointment(s) needed: N/A  Additonal Notes: N/A

## 2024-03-01 ENCOUNTER — LAB (OUTPATIENT)
Dept: LAB | Facility: CLINIC | Age: 34
End: 2024-03-01
Payer: COMMERCIAL

## 2024-03-01 DIAGNOSIS — K50.90 CROHN'S DISEASE (H): ICD-10-CM

## 2024-03-01 DIAGNOSIS — K50.00 CROHN'S DISEASE OF SMALL INTESTINE (H): ICD-10-CM

## 2024-03-01 LAB
BASOPHILS # BLD AUTO: 0 10E3/UL (ref 0–0.2)
BASOPHILS NFR BLD AUTO: 1 %
EOSINOPHIL # BLD AUTO: 0.2 10E3/UL (ref 0–0.7)
EOSINOPHIL NFR BLD AUTO: 3 %
ERYTHROCYTE [DISTWIDTH] IN BLOOD BY AUTOMATED COUNT: 11.8 % (ref 10–15)
ERYTHROCYTE [SEDIMENTATION RATE] IN BLOOD BY WESTERGREN METHOD: 7 MM/HR (ref 0–20)
HCT VFR BLD AUTO: 42.9 % (ref 35–47)
HGB BLD-MCNC: 14.6 G/DL (ref 11.7–15.7)
IMM GRANULOCYTES # BLD: 0 10E3/UL
IMM GRANULOCYTES NFR BLD: 0 %
LYMPHOCYTES # BLD AUTO: 1.8 10E3/UL (ref 0.8–5.3)
LYMPHOCYTES NFR BLD AUTO: 23 %
MCH RBC QN AUTO: 30.2 PG (ref 26.5–33)
MCHC RBC AUTO-ENTMCNC: 34 G/DL (ref 31.5–36.5)
MCV RBC AUTO: 89 FL (ref 78–100)
MONOCYTES # BLD AUTO: 0.8 10E3/UL (ref 0–1.3)
MONOCYTES NFR BLD AUTO: 10 %
NEUTROPHILS # BLD AUTO: 5 10E3/UL (ref 1.6–8.3)
NEUTROPHILS NFR BLD AUTO: 64 %
PLATELET # BLD AUTO: 292 10E3/UL (ref 150–450)
RBC # BLD AUTO: 4.84 10E6/UL (ref 3.8–5.2)
WBC # BLD AUTO: 7.9 10E3/UL (ref 4–11)

## 2024-03-01 PROCEDURE — 36415 COLL VENOUS BLD VENIPUNCTURE: CPT

## 2024-03-01 PROCEDURE — 85025 COMPLETE CBC W/AUTO DIFF WBC: CPT

## 2024-03-01 PROCEDURE — 86140 C-REACTIVE PROTEIN: CPT

## 2024-03-01 PROCEDURE — 86706 HEP B SURFACE ANTIBODY: CPT

## 2024-03-01 PROCEDURE — 82248 BILIRUBIN DIRECT: CPT

## 2024-03-01 PROCEDURE — 80053 COMPREHEN METABOLIC PANEL: CPT

## 2024-03-01 PROCEDURE — 85652 RBC SED RATE AUTOMATED: CPT

## 2024-03-01 PROCEDURE — 87340 HEPATITIS B SURFACE AG IA: CPT

## 2024-03-02 LAB
ALBUMIN SERPL BCG-MCNC: 4.3 G/DL (ref 3.5–5.2)
ALP SERPL-CCNC: 41 U/L (ref 40–150)
ALT SERPL W P-5'-P-CCNC: 15 U/L (ref 0–50)
ANION GAP SERPL CALCULATED.3IONS-SCNC: 10 MMOL/L (ref 7–15)
AST SERPL W P-5'-P-CCNC: 19 U/L (ref 0–45)
BILIRUB DIRECT SERPL-MCNC: <0.2 MG/DL (ref 0–0.3)
BILIRUB SERPL-MCNC: 0.3 MG/DL
BUN SERPL-MCNC: 11.3 MG/DL (ref 6–20)
CALCIUM SERPL-MCNC: 9.2 MG/DL (ref 8.6–10)
CHLORIDE SERPL-SCNC: 103 MMOL/L (ref 98–107)
CREAT SERPL-MCNC: 0.62 MG/DL (ref 0.51–0.95)
CRP SERPL-MCNC: <3 MG/L
DEPRECATED HCO3 PLAS-SCNC: 25 MMOL/L (ref 22–29)
EGFRCR SERPLBLD CKD-EPI 2021: >90 ML/MIN/1.73M2
GLUCOSE SERPL-MCNC: 86 MG/DL (ref 70–99)
HBV SURFACE AB SERPL IA-ACNC: 9.32 M[IU]/ML
HBV SURFACE AB SERPL IA-ACNC: NORMAL M[IU]/ML
HBV SURFACE AG SERPL QL IA: NONREACTIVE
POTASSIUM SERPL-SCNC: 4.2 MMOL/L (ref 3.4–5.3)
PROT SERPL-MCNC: 6.8 G/DL (ref 6.4–8.3)
SODIUM SERPL-SCNC: 138 MMOL/L (ref 135–145)

## 2024-03-04 ENCOUNTER — TELEPHONE (OUTPATIENT)
Dept: GASTROENTEROLOGY | Facility: CLINIC | Age: 34
End: 2024-03-04
Payer: COMMERCIAL

## 2024-03-04 NOTE — TELEPHONE ENCOUNTER
Left Voicemail (2nd Attempt) and Sent Mychart (2nd Attempt) for the patient to call back and schedule the following:    Appointment type: Return IBD  Provider: Dr Ponce  Return date: 08/27/24  Specialty phone number: [phone]  Additional appointment(s) needed: N/A  Additonal Notes: N/A

## 2024-03-07 ENCOUNTER — TELEPHONE (OUTPATIENT)
Dept: SURGERY | Facility: CLINIC | Age: 34
End: 2024-03-07
Payer: COMMERCIAL

## 2024-03-07 NOTE — TELEPHONE ENCOUNTER
[facility] Call Center    Phone Message    May a detailed message be left on voicemail: yes     Reason for Call: Other: Patient is calling in regards to her stellara injection. She is supposed to administer the injection today, but her  recently tested positive for COVID and no she is experiencing a sore throat and cough. Patient is wondering if she should hold off on taking the medication for a few days or not. Please reach back out with some guidance.      Action Taken: Message routed to:  [facility] GI    Travel Screening: Not Applicable

## 2024-03-08 ENCOUNTER — MYC MEDICAL ADVICE (OUTPATIENT)
Dept: GASTROENTEROLOGY | Facility: CLINIC | Age: 34
End: 2024-03-08
Payer: COMMERCIAL

## 2024-03-08 NOTE — TELEPHONE ENCOUNTER
March 8, 2024    Called Marybel Left message and contact info to return call regarding: Danielle    Sent MyC message with above request.

## 2024-03-12 NOTE — TELEPHONE ENCOUNTER
Marybel return my call, pt states symptoms started last Thursday and tested positive on 3/9/24. Pt states she is starting to feel better and back to her baseline. Pt will resume Stelara in a few days after the one week and totally back to her baseline.

## 2024-03-12 NOTE — TELEPHONE ENCOUNTER
March 12, 2024    Called Marybel Left message and contact info to return call regarding: symptoms

## 2024-05-16 ENCOUNTER — TELEPHONE (OUTPATIENT)
Dept: GASTROENTEROLOGY | Facility: CLINIC | Age: 34
End: 2024-05-16
Payer: COMMERCIAL

## 2024-05-16 NOTE — TELEPHONE ENCOUNTER
Attempted to contact Marybel to schedule MTM follow-up visit, unable to reach, left message with contact number.    Vijay Matthews, PharmD, BCPS  [facility] Pharmacist   [facility]  Phone: [phone]

## 2024-05-28 ENCOUNTER — MYC REFILL (OUTPATIENT)
Dept: GASTROENTEROLOGY | Facility: CLINIC | Age: 34
End: 2024-05-28
Payer: COMMERCIAL

## 2024-05-28 DIAGNOSIS — K50.90 CROHN'S DISEASE (H): ICD-10-CM

## 2024-05-30 DIAGNOSIS — K50.90 CROHN'S DISEASE WITHOUT COMPLICATION, UNSPECIFIED GASTROINTESTINAL TRACT LOCATION (H): Primary | ICD-10-CM

## 2024-05-30 RX ORDER — USTEKINUMAB 90 MG/ML
INJECTION, SOLUTION SUBCUTANEOUS
Qty: 1 ML | Refills: 3 | Status: SHIPPED | OUTPATIENT
Start: 2024-05-30

## 2024-05-30 NOTE — TELEPHONE ENCOUNTER
Additional refill sent. Perpetuall message already sent to patient. Will attempt one more time to get follow-up visit prior to returning medication management to clinic team.     Last provider visit: 2024  Next provider visit: 9/3/2024  Last labs completed: 3/1/2024  Lab frequency: every 3 months   - standing labs available until 2024  Next labs due: now (standard, Tb, hep B core antibodh)  Last TB screenin2023  PDC: 100%

## 2024-06-18 ENCOUNTER — LAB (OUTPATIENT)
Dept: LAB | Facility: CLINIC | Age: 34
End: 2024-06-18
Payer: COMMERCIAL

## 2024-06-18 DIAGNOSIS — K50.90 CROHN'S DISEASE (H): ICD-10-CM

## 2024-06-18 DIAGNOSIS — K50.90 CROHN'S DISEASE WITHOUT COMPLICATION, UNSPECIFIED GASTROINTESTINAL TRACT LOCATION (H): ICD-10-CM

## 2024-06-18 DIAGNOSIS — K50.00 CROHN'S DISEASE OF SMALL INTESTINE (H): ICD-10-CM

## 2024-06-18 LAB
BASOPHILS # BLD AUTO: 0.1 10E3/UL (ref 0–0.2)
BASOPHILS NFR BLD AUTO: 1 %
EOSINOPHIL # BLD AUTO: 0.4 10E3/UL (ref 0–0.7)
EOSINOPHIL NFR BLD AUTO: 5 %
ERYTHROCYTE [DISTWIDTH] IN BLOOD BY AUTOMATED COUNT: 12.3 % (ref 10–15)
ERYTHROCYTE [SEDIMENTATION RATE] IN BLOOD BY WESTERGREN METHOD: 5 MM/HR (ref 0–20)
HCT VFR BLD AUTO: 41.5 % (ref 35–47)
HGB BLD-MCNC: 14.4 G/DL (ref 11.7–15.7)
IMM GRANULOCYTES # BLD: 0 10E3/UL
IMM GRANULOCYTES NFR BLD: 0 %
LYMPHOCYTES # BLD AUTO: 2 10E3/UL (ref 0.8–5.3)
LYMPHOCYTES NFR BLD AUTO: 22 %
MCH RBC QN AUTO: 31.1 PG (ref 26.5–33)
MCHC RBC AUTO-ENTMCNC: 34.7 G/DL (ref 31.5–36.5)
MCV RBC AUTO: 90 FL (ref 78–100)
MONOCYTES # BLD AUTO: 0.9 10E3/UL (ref 0–1.3)
MONOCYTES NFR BLD AUTO: 10 %
NEUTROPHILS # BLD AUTO: 5.7 10E3/UL (ref 1.6–8.3)
NEUTROPHILS NFR BLD AUTO: 62 %
PLATELET # BLD AUTO: 295 10E3/UL (ref 150–450)
RBC # BLD AUTO: 4.63 10E6/UL (ref 3.8–5.2)
WBC # BLD AUTO: 9.1 10E3/UL (ref 4–11)

## 2024-06-18 PROCEDURE — 82248 BILIRUBIN DIRECT: CPT

## 2024-06-18 PROCEDURE — 86481 TB AG RESPONSE T-CELL SUSP: CPT

## 2024-06-18 PROCEDURE — 36415 COLL VENOUS BLD VENIPUNCTURE: CPT

## 2024-06-18 PROCEDURE — 86140 C-REACTIVE PROTEIN: CPT

## 2024-06-18 PROCEDURE — 86704 HEP B CORE ANTIBODY TOTAL: CPT

## 2024-06-18 PROCEDURE — 85025 COMPLETE CBC W/AUTO DIFF WBC: CPT

## 2024-06-18 PROCEDURE — 85652 RBC SED RATE AUTOMATED: CPT

## 2024-06-18 PROCEDURE — 80053 COMPREHEN METABOLIC PANEL: CPT

## 2024-06-19 LAB
ALBUMIN SERPL BCG-MCNC: 4.4 G/DL (ref 3.5–5.2)
ALP SERPL-CCNC: 39 U/L (ref 40–150)
ALT SERPL W P-5'-P-CCNC: 12 U/L (ref 0–50)
ANION GAP SERPL CALCULATED.3IONS-SCNC: 12 MMOL/L (ref 7–15)
AST SERPL W P-5'-P-CCNC: 18 U/L (ref 0–45)
BILIRUB DIRECT SERPL-MCNC: <0.2 MG/DL (ref 0–0.3)
BILIRUB SERPL-MCNC: 0.4 MG/DL
BUN SERPL-MCNC: 11 MG/DL (ref 6–20)
CALCIUM SERPL-MCNC: 9.2 MG/DL (ref 8.6–10)
CHLORIDE SERPL-SCNC: 105 MMOL/L (ref 98–107)
CREAT SERPL-MCNC: 0.57 MG/DL (ref 0.51–0.95)
CRP SERPL-MCNC: <3 MG/L
DEPRECATED HCO3 PLAS-SCNC: 21 MMOL/L (ref 22–29)
EGFRCR SERPLBLD CKD-EPI 2021: >90 ML/MIN/1.73M2
GLUCOSE SERPL-MCNC: 78 MG/DL (ref 70–99)
HBV CORE AB SERPL QL IA: NONREACTIVE
POTASSIUM SERPL-SCNC: 3.9 MMOL/L (ref 3.4–5.3)
PROT SERPL-MCNC: 6.9 G/DL (ref 6.4–8.3)
QUANTIFERON MITOGEN: 10 IU/ML
QUANTIFERON NIL TUBE: 0.02 IU/ML
QUANTIFERON TB1 TUBE: 0.05 IU/ML
QUANTIFERON TB2 TUBE: 0.05
SODIUM SERPL-SCNC: 138 MMOL/L (ref 135–145)

## 2024-06-20 LAB
GAMMA INTERFERON BACKGROUND BLD IA-ACNC: 0.02 IU/ML
M TB IFN-G BLD-IMP: NEGATIVE
M TB IFN-G CD4+ BCKGRND COR BLD-ACNC: 9.98 IU/ML
MITOGEN IGNF BCKGRD COR BLD-ACNC: 0.03 IU/ML
MITOGEN IGNF BCKGRD COR BLD-ACNC: 0.03 IU/ML

## 2024-06-25 ENCOUNTER — TELEPHONE (OUTPATIENT)
Dept: GASTROENTEROLOGY | Facility: CLINIC | Age: 34
End: 2024-06-25
Payer: COMMERCIAL

## 2024-06-25 NOTE — TELEPHONE ENCOUNTER
Left Voicemail (1st Attempt) for the patient to call back and schedule the following:    Appointment type: return   Provider: Dr. Ponce  Return date: 9/3  Specialty phone number: [phone]  Additional appointment(s) needed:   Additional Notes:

## 2024-06-25 NOTE — TELEPHONE ENCOUNTER
PA Initiation    Medication: USTEKINUMAB 90 MG/ML [facility]  Insurance Company: [employer] - Phone [phone] Fax [phone]  Pharmacy Filling the Rx:    Filling Pharmacy Phone:    Filling Pharmacy Fax:    Start Date: 6/25/2024  [id number]

## 2024-06-26 NOTE — TELEPHONE ENCOUNTER
Prior Authorization Approval    Medication: USTEKINUMAB 90 MG/ML [facility]  Authorization Effective Date: 5/26/2024  Authorization Expiration Date: 6/25/2025  Approved Dose/Quantity: 1/42  Reference #: [id number]   Insurance Company: [employer] - Phone [phone] Fax [phone]  Expected CoPay: $    CoPay Card Available:      Financial Assistance Needed:    Which [facility] is filling the prescription[facility] - [address] [place] [address]  Pharmacy Notified:    Patient Notified:

## 2024-06-27 ENCOUNTER — TELEPHONE (OUTPATIENT)
Dept: GASTROENTEROLOGY | Facility: CLINIC | Age: 34
End: 2024-06-27
Payer: COMMERCIAL

## 2024-08-15 NOTE — PROGRESS NOTES
[facility] Dermatology Note  Encounter Date: Aug 20, 2024  Office Visit     Dermatology Problem List:  Last skin check 8/20/24  1. Nevi to monitor  - Left flank, R plantar foot, L plantar foot - measurements and photos obtained 3/16/21 and stable on 9/20/21, 8/22/2022, 8/22/23, 8/24  - used to follow at [facility] in [facility], records requested 3/16/21  2. Family hx of melanoma in grandfather, father    Patient is on Stelara for Crohn's disease    ____________________________________________    Assessment & Plan:    # Nevi to monitor  Left flank - photodocumentation in chart. Stable.  R plantar foot -  photodocumentation in chart. Stable.  L plantar foot -  photodocumentation in chart. Stable.    # Family history of melanoma, grandfather and  and uncertain type of skin cancer in father.    # Skin cancer screening with multiple benign nevi, solar lentigines    - ABCDEs: Counseled ABCDEs of melanoma: Asymmetry, Border (irregularity), Color (not uniform, changes in color), Diameter (greater than 6 mm which is about the size of a pencil eraser), and Evolving (any changes in preexisting moles).  - Sun protection: Counseled SPF30+ sunscreen, UPF clothing, sun avoidance, tanning bed avoidance.    # Cherry angiomas.  Benign, reassurance given.        # Subungual hematoma, growing out on the L 2nd nail plate,  -Reassurance given   -Uncertain if she will lose the nail or have permanent change to the nail plate.   Will continue to monitor.         Procedures Performed:   None    Follow-up: 1 year(s) in-person, or earlier for new or changing lesions    Staff and Scribe:     Scribe Disclosure:   I, Lana Tipton, am serving as a scribe to document services personally performed by Clarisa Ruggiero PA-C based on data collection and the provider's statements to me.       Provider Disclosure:   The documentation recorded by the scribe accurately reflects the services I personally performed and the  decisions made by me.    All risks, benefits and alternatives were discussed with patient.  Patient is in agreement and understands the assessment and plan.  All questions were answered.  Sun Screen Education was given.   Return to Clinic annually or sooner as needed.   Clarisa Ruggiero PA-C   [facility]    ____________________________________________    CC: Skin Check ([facility], questions about injury on R 2nd toe)    HPI:  Ms. Marybel Redd is a(n) 34 year old female who presents today as a return patient for skin check.    Last seen in dermatology by me 8/22/23 for TBSE. Benign skin findings and lesions to monitor all stable.    Today, patient reports an injury to her R 2nd conklin from shoes that were too small. Reports no rapidly growing or changing lesions.      Patient is otherwise feeling well, without additional skin concerns.    Labs Reviewed:  N/A    Physical Exam:  Vitals: There were no vitals taken for this visit.  SKIN: Full skin, which includes the head/face, both arms, chest, back, abdomen,both legs, genitalia and/or groin buttocks, digits and/or nails, was examined.  - R foot: 5x5 mm dark brown macule with lattice pattern under dermoscopy; several dark brown 2-3 mm macules. No change from previous photographs or dermoscopy.  - L foot  5x5 mm dark brown macule with lattice pattern under dermoscopy; several dark brown 2-3 mm macules. No change from previous photographs or dermoscopy.  - L flank  5x4 mm medium brown to red macule with irregular borders and globular pattern under dermoscopy. No change from previous photographs or dermoscopy.  - Subungual Violaceous macule to the complete L 2nd nail plate. There is horizontal ridging on the nail plate. Non tender.    - There are dome shaped bright red papules on the scalp and trunk.   Multiple regular brown pigmented macules and papules are identified on the face, trunk and extremities.   Scattered brown macules on sun  exposed areas..   - No other lesions of concern on areas examined.               Medications:  Current Outpatient Medications   Medication Sig Dispense Refill    Prenatal Vit-Fe Fumarate-FA (PRENATAL MULTIVITAMIN  PLUS IRON) 27-1 MG TABS Take 1 tablet by mouth daily Ask patient what brand using      ustekinumab (STELARA) 90 MG/ML INJECT 1 ML (90 MG) UNDER THE SKIN EVERY 6 WEEKS. 1 mL 3     No current facility-administered medications for this visit.      Past Medical History:   Patient Active Problem List   Diagnosis    Crohn's disease (H)    ASCUS with positive high risk HPV cervical    Crohn's disease of small intestine (H)    Crohn's disease of colon with intestinal obstruction (H)    Painful menstruation    Multiple nevi    IUD (intrauterine device) in place    Elevated blood pressure reading without diagnosis of hypertension    SBO (small bowel obstruction) (H)    Crohn's disease of both small and large intestine without complication (H)    Anemia, iron deficiency    Iron deficiency anemia, unspecified     Past Medical History:   Diagnosis Date    Abnormal Pap smear of cervix 2016    Autoimmune disease (H24) 2012    Crohns    Crohn's disease (H) 04/08/2021    Hypertension 12/2020    Unsure it previously caused by steroid meds    SBO (small bowel obstruction) (H) 2020    Uncomplicated asthma     Used inhaler in childhood, no issues as adult        CC No referring provider defined for this encounter. on close of this encounter.

## 2024-08-15 NOTE — PATIENT INSTRUCTIONS

## 2024-08-19 ENCOUNTER — TELEPHONE (OUTPATIENT)
Dept: GASTROENTEROLOGY | Facility: CLINIC | Age: 34
End: 2024-08-19
Payer: COMMERCIAL

## 2024-08-19 ENCOUNTER — LAB REQUISITION (OUTPATIENT)
Dept: LAB | Facility: CLINIC | Age: 34
End: 2024-08-19
Payer: COMMERCIAL

## 2024-08-19 DIAGNOSIS — Z31.41 ENCOUNTER FOR FERTILITY TESTING: ICD-10-CM

## 2024-08-19 LAB
ESTRADIOL SERPL-MCNC: 46 PG/ML
FSH SERPL IRP2-ACNC: 6.7 MIU/ML
LH SERPL-ACNC: 10.5 MIU/ML
MIS SERPL-MCNC: 3.22 NG/ML (ref 0.58–8.1)
PROLACTIN SERPL 3RD IS-MCNC: 22 NG/ML (ref 5–23)
TSH SERPL DL<=0.005 MIU/L-ACNC: 1.67 UIU/ML (ref 0.3–4.2)

## 2024-08-19 PROCEDURE — 84146 ASSAY OF PROLACTIN: CPT | Performed by: OBSTETRICS & GYNECOLOGY

## 2024-08-19 PROCEDURE — 82166 ASSAY ANTI-MULLERIAN HORM: CPT | Mod: ORL | Performed by: OBSTETRICS & GYNECOLOGY

## 2024-08-19 PROCEDURE — 83002 ASSAY OF GONADOTROPIN (LH): CPT | Performed by: OBSTETRICS & GYNECOLOGY

## 2024-08-19 PROCEDURE — 83001 ASSAY OF GONADOTROPIN (FSH): CPT | Performed by: OBSTETRICS & GYNECOLOGY

## 2024-08-19 PROCEDURE — 82670 ASSAY OF TOTAL ESTRADIOL: CPT | Performed by: OBSTETRICS & GYNECOLOGY

## 2024-08-19 PROCEDURE — 84443 ASSAY THYROID STIM HORMONE: CPT | Performed by: OBSTETRICS & GYNECOLOGY

## 2024-08-19 NOTE — TELEPHONE ENCOUNTER
Pt is due for follow up appointment with Vijay LOPEZ.     Call placed to pt to initiate scheduling on 8/19.    Outcome: LVM and MyC

## 2024-08-20 ENCOUNTER — OFFICE VISIT (OUTPATIENT)
Dept: DERMATOLOGY | Facility: CLINIC | Age: 34
End: 2024-08-20
Payer: COMMERCIAL

## 2024-08-20 DIAGNOSIS — Z12.83 SKIN CANCER SCREENING: ICD-10-CM

## 2024-08-20 DIAGNOSIS — D18.01 CHERRY ANGIOMA: ICD-10-CM

## 2024-08-20 DIAGNOSIS — L81.4 SOLAR LENTIGO: ICD-10-CM

## 2024-08-20 DIAGNOSIS — D22.9 MULTIPLE BENIGN NEVI: Primary | ICD-10-CM

## 2024-08-20 PROCEDURE — 99213 OFFICE O/P EST LOW 20 MIN: CPT | Performed by: PHYSICIAN ASSISTANT

## 2024-08-20 NOTE — LETTER
8/20/2024      Marybel BRANDON Tramaine  [address] [place] [address]      Dear Colleague,    Thank you for referring your patient, Marybel Redd, to the [facility] [place]. Please see a copy of my visit note below.      [facility] Dermatology Note  Encounter Date: Aug 20, 2024  Office Visit     Dermatology Problem List:  Last skin check 8/20/24  1. Nevi to monitor  - Left flank, R plantar foot, L plantar foot - measurements and photos obtained 3/16/21 and stable on 9/20/21, 8/22/2022, 8/22/23, 8/24  - used to follow at [facility] in [facility], records requested 3/16/21  2. Family hx of melanoma in grandfather, father    Patient is on Stelara for Crohn's disease    ____________________________________________    Assessment & Plan:    # Nevi to monitor  Left flank - photodocumentation in chart. Stable.  R plantar foot -  photodocumentation in chart. Stable.  L plantar foot -  photodocumentation in chart. Stable.    # Family history of melanoma, grandfather and  and uncertain type of skin cancer in father.    # Skin cancer screening with multiple benign nevi, solar lentigines    - ABCDEs: Counseled ABCDEs of melanoma: Asymmetry, Border (irregularity), Color (not uniform, changes in color), Diameter (greater than 6 mm which is about the size of a pencil eraser), and Evolving (any changes in preexisting moles).  - Sun protection: Counseled SPF30+ sunscreen, UPF clothing, sun avoidance, tanning bed avoidance.    # Cherry angiomas.  Benign, reassurance given.        # Subungual hematoma, growing out on the L 2nd nail plate,  -Reassurance given   -Uncertain if she will lose the nail or have permanent change to the nail plate.   Will continue to monitor.         Procedures Performed:   None    Follow-up: 1 year(s) in-person, or earlier for new or changing lesions    Staff and Scribe:     Scribe Disclosure:   I, Lana Tipton, am serving as a scribe to document services  personally performed by Clarisa Ruggiero PA-C based on data collection and the provider's statements to me.       Provider Disclosure:   The documentation recorded by the scribe accurately reflects the services I personally performed and the decisions made by me.    All risks, benefits and alternatives were discussed with patient.  Patient is in agreement and understands the assessment and plan.  All questions were answered.  Sun Screen Education was given.   Return to Clinic annually or sooner as needed.   Clarisa Ruggiero PA-C   [facility]    ____________________________________________    CC: Skin Check ([facility], questions about injury on R 2nd toe)    HPI:  Ms. Marybel Redd is a(n) 34 year old female who presents today as a return patient for skin check.    Last seen in dermatology by me 8/22/23 for TBSE. Benign skin findings and lesions to monitor all stable.    Today, patient reports an injury to her R 2nd conklin from shoes that were too small. Reports no rapidly growing or changing lesions.      Patient is otherwise feeling well, without additional skin concerns.    Labs Reviewed:  N/A    Physical Exam:  Vitals: There were no vitals taken for this visit.  SKIN: Full skin, which includes the head/face, both arms, chest, back, abdomen,both legs, genitalia and/or groin buttocks, digits and/or nails, was examined.  - R foot: 5x5 mm dark brown macule with lattice pattern under dermoscopy; several dark brown 2-3 mm macules. No change from previous photographs or dermoscopy.  - L foot  5x5 mm dark brown macule with lattice pattern under dermoscopy; several dark brown 2-3 mm macules. No change from previous photographs or dermoscopy.  - L flank  5x4 mm medium brown to red macule with irregular borders and globular pattern under dermoscopy. No change from previous photographs or dermoscopy.  - Subungual Violaceous macule to the complete L 2nd nail plate. There is horizontal  ridging on the nail plate. Non tender.    - There are dome shaped bright red papules on the scalp and trunk.   Multiple regular brown pigmented macules and papules are identified on the face, trunk and extremities.   Scattered brown macules on sun exposed areas..   - No other lesions of concern on areas examined.               Medications:  Current Outpatient Medications   Medication Sig Dispense Refill     Prenatal Vit-Fe Fumarate-FA (PRENATAL MULTIVITAMIN  PLUS IRON) 27-1 MG TABS Take 1 tablet by mouth daily Ask patient what brand using       ustekinumab (STELARA) 90 MG/ML INJECT 1 ML (90 MG) UNDER THE SKIN EVERY 6 WEEKS. 1 mL 3     No current facility-administered medications for this visit.      Past Medical History:   Patient Active Problem List   Diagnosis     Crohn's disease (H)     ASCUS with positive high risk HPV cervical     Crohn's disease of small intestine (H)     Crohn's disease of colon with intestinal obstruction (H)     Painful menstruation     Multiple nevi     IUD (intrauterine device) in place     Elevated blood pressure reading without diagnosis of hypertension     SBO (small bowel obstruction) (H)     Crohn's disease of both small and large intestine without complication (H)     Anemia, iron deficiency     Iron deficiency anemia, unspecified     Past Medical History:   Diagnosis Date     Abnormal Pap smear of cervix 2016     Autoimmune disease (H24) 2012    Crohns     Crohn's disease (H) 04/08/2021     Hypertension 12/2020    Unsure it previously caused by steroid meds     SBO (small bowel obstruction) (H) 2020     Uncomplicated asthma     Used inhaler in childhood, no issues as adult        CC No referring provider defined for this encounter. on close of this encounter.      Again, thank you for allowing me to participate in the care of your patient.        Sincerely,        Clarisa Ruggiero PA-C

## 2024-09-03 ENCOUNTER — MYC MEDICAL ADVICE (OUTPATIENT)
Dept: DERMATOLOGY | Facility: CLINIC | Age: 34
End: 2024-09-03

## 2024-09-26 NOTE — PROGRESS NOTES
"[facility]  Gynecology Visit    CC: establish care    HPI:    Marybel Lyon is a 30 year old P0, here to establish care and for annual exam. Patient moved from outside of [place] to [place] this past fall. She has Crohn's disease and recently had imaging that showed small 4.3cm anterior fibroid. She would like further imaging to better characterize the fibroid.  She is also due for pap smear. She has no other concerns today     Obstetrics History:  - never pregnant     Gynecologic History:  - LMP: Patient's last menstrual period was 03/06/2021.  - Last Pap: ASCUS, HPV: HR positive (9/2016). Colposcopy 10/20/2016 normal. Following this she had Pap smear in 2017 that was normal.   - Denies any history of frequent UTIs, vaginal infections, or STIs  - Menses: cycles monthly, lasting 5 days, with moderate flow, no clots, minimal cramps  - Contraception: condoms  - Sexual Activity: not currently, uses condoms when she is sexually active.     Past Medical History:  Crohn's Disease   History of paroxsymal SVT s/p ablation at age 3   Past Medical History:   Diagnosis Date     Crohn's disease (H)        Past Surgical History:  Ablation for heart dysrhythmia age 3  Multiple colonoscopies   Past Surgical History:   Procedure Laterality Date     NO HISTORY OF SURGERY         Current Medications:  None     Allergies:  Other environmental allergy    Social History:   Works at [employer]   Feels safe at home, lives alone   Social alcohol use, no drug use or tobacco use  Exercise- treadmill, pilates     Family History:  Mother: colon polyps  Father HTN, history of kidney stones  MGM heart disease, colon cancer  PGF heart disease, diabetes  No breast cancer or ovarian cancer in family     ROS:  10-point ROS negative except as in HPI     Physical Exam  BP (!) 130/91 (BP Location: Left arm, Patient Position: Chair)   Pulse 103   Ht 1.626 m (5' 4\")   Wt 58.3 kg (128 lb 9.6 oz)   LMP 03/06/2021   Breastfeeding No   " [FreeTextEntry1] : 65-year-old male with PMHx of hypertension, hypercholesterolemia, hypothyroidism presented with pain abdomen, weakness, weight loss since July. Found to have liver abscess. s/p IR drainage of cyst - cx with bacteroides  sx was consulted.  entamoeba serology negative  quant gold was negative discharged on cefpodoxime 200 mg q12h and flagyl 500 mg q8h for 4 weeks until 9/26/24.  pt is here for follow up. Feels well. No abdominal pain, appetite normal. He had 2 drains removed already, one left, seeing IR monday for drain check.  Given the drain fluid still purulent appearing will extend the abx course for another 2 weeks.  Per pt cefpodoxime expensive with a lot of co pay, will transition to cipro 500 mg q12h and c//w flagyl 500 q8h.  Pt to follow in 10 days.  All questions answered.   BMI 22.07 kg/m    Gen: Well-appearing, NAD  HEENT: Normocephalic, atraumatic  Neck: Thyroid is not enlarged, no appreciable masses palpated. Non-tender  CV:  RRR, no m/r/g auscultated  Pulm: CTAB, no w/r/r auscultated  Abd: Soft, non-tender, non-distended  Ext: No LE edema, extremities warm and well perfused    Breast Exam:  Breast: Without visible skin changes. No dimpling or lesions seen.   Breasts supple, non-tender with palpation, no dominant mass, nodularity, or nipple discharge noted bilaterally. Axillary nodes negative.      Pelvic Exam:  EG/BUS: Normal genital architecture without lesions, erythema or abnormal secretions Bartholin's, Urethra, [place]'s normal   Urethral meatus: normal   Urethra: no masses, tenderness, or scarring   Bladder: no masses or tenderness   Vagina: moist, pink, rugae with creamy, white and odorless  secretions  Cervix: Nulliparous, and no lesions  Uterus: anteverted,  and small, smooth, firm, mobile w/o pain  Adnexa: Within normal limits and No masses, nodularity, tenderness  Rectum:anus normal     I have reviewed labs and imaging    Assessment/Plan  Marybel Lyon is a 30 year old P0, here to establish care and for annual exam.     # Cervical cancer screening  - Pap smear obtained today. Okay with results via [employer]   - S/p HPV vaccine in 2006     # Fibroids  - Discussed with patient incidental fibroid is very small and no reason for intervention at this time. Her menses are regular and no concerns from that standpoint.   - Pelvic ultrasound ordered to better characterize fibroid. Okay with results via [employer]    Additional teaching done at this visit regarding self breast exam, exercise, birth control, mental health and weight/diet.    Patient staffed with Dr. Arnold Fermin MD  OB/GYN Resident, PGY-4  3/24/2021, 7:39 AM    The Patient was seen in Resident Continuity Clinic by GAVI FERMIN.  I reviewed the history & exam. Assessment and plan were jointly  made.    Caitlin Barrett MD

## 2024-11-12 ENCOUNTER — MYC REFILL (OUTPATIENT)
Dept: PHARMACY | Facility: CLINIC | Age: 34
End: 2024-11-12
Payer: COMMERCIAL

## 2024-11-12 DIAGNOSIS — K50.90 CROHN'S DISEASE (H): ICD-10-CM

## 2024-11-12 RX ORDER — USTEKINUMAB 90 MG/ML
INJECTION, SOLUTION SUBCUTANEOUS
Qty: 1 ML | Refills: 3 | Status: SHIPPED | OUTPATIENT
Start: 2024-11-12

## 2024-11-12 NOTE — TELEPHONE ENCOUNTER
Stelara reordered with CPA with Dr. Ponce. Patient due for labs and MTM visit, Myc sent.     Last provider visit: 2024 MD Rosario  Next provider visit: 2024 MD Rosario  Last labs completed: 2024  Lab frequency: every 3 months   - standing labs reordered with this encounter  Next labs due: now  Last TB screenin2024  PDC: 96%    Vijay Matthews PharmD, BCPS  [place] Pharmacist   [facility]  Phone: [phone]

## 2024-11-26 ENCOUNTER — OFFICE VISIT (OUTPATIENT)
Dept: GASTROENTEROLOGY | Facility: CLINIC | Age: 34
End: 2024-11-26
Payer: COMMERCIAL

## 2024-11-26 VITALS
WEIGHT: 142.7 LBS | HEIGHT: 64 IN | HEART RATE: 89 BPM | BODY MASS INDEX: 24.36 KG/M2 | OXYGEN SATURATION: 99 % | SYSTOLIC BLOOD PRESSURE: 137 MMHG | DIASTOLIC BLOOD PRESSURE: 94 MMHG

## 2024-11-26 DIAGNOSIS — D84.9 IMMUNOSUPPRESSION (H): ICD-10-CM

## 2024-11-26 DIAGNOSIS — K50.00 CROHN'S DISEASE OF ILEUM WITHOUT COMPLICATION (H): Primary | ICD-10-CM

## 2024-11-26 ASSESSMENT — PAIN SCALES - GENERAL: PAINLEVEL_OUTOF10: NO PAIN (0)

## 2024-11-26 NOTE — LETTER
11/26/2024      Marybel ALEKSANDR Tramaine  [address] [place] [address]      Dear Colleague,    Thank you for referring your patient, Marybel Redd, to the [facility] [place]. Please see a copy of my visit note below.    IBD CLINIC VISIT     CC/REFERRING MD:  Dr. Akin Bernal  REASON FOR FOLLOW UP: Crohn's    ASSESSMENT/PLAN  34 year old female with Crohn's disease of the small bowel (ileum and likely jejunum)    1. Small bowel crohn's disease:    Current medication:    Ustekinumab every 8 weeks (started 5/2021)   Ustekinumab every 6 weeks (Spring 2022)  Current clinical disease activity: remission, HBI 0   Last endoscopic disease activity: 11/30/22: SES-CD: 0  Last radiographic disease assessment: MRE 1/17/22 with moderate segmental length of ileum (10cm approx) with diffuse wall thickening consistent with acute and chronic inflammation.    Continues to be in clinical remission at this time. Last colonoscopy 11/2022 without any detectable inflammation on ustekinumab every 6 weeks. Plan to continue this regimen for maintenance of remission.     Did discuss conceiving in the setting of Crohn's disease. Explained that most medications from a fertility stand point are okay to use in the setting of Crohn's disease. Previously completed consultation in [facility] clinic and offered repeat visit today if continued concerns arise. Otherwise advised to return to clinic or reach out to the clinic with any concerns if needed.     PLAN  --Continue Stelara every 6 weeks  --------------Monitoring labs every 3 months  ---Let us know when you're pregnant so we can adjust your Stelara schedule  ---MTM referral    Colon cancer screening:  Given disease is limited to small bowel, colon cancer screening is recommended at age 45.     Misc:  -- Avoid tobacco use  -- Avoid NSAIDs as there is potentially a 25% chance of causing an IBD flare    RTC 6 months    Patient seen with and care plan discussed  with Dr. Ponce.    Griselda Petit MD  GI service, IM PGY 3    CROHN'S HISTORY:  Age at diagnosis: 2012  Extent of disease: small bowel   Disease phenotype: inflammatory  Chayito-anal disease: none  Prior IBD surgeries: none  Prior IBD Medications:    50 mg 6MP    DRUG MONITORING  TPMT enzyme activity: 40.4 (6/14/2012)    6-TGN/6-MMPN levels: --    Biologic concentration:  Usteinumab 2/2022 = 1.6, moved to every 6 weeks     HPI:   Here for follow-up.   Getting  in 2 weeks!!  Bowel pattern is 1 BM daily. Occasional constipation and bloating.  Nutrition goal: tries to be gluten free.      HBI:  Overall patient well being (prior day): 0 (Very well)  Abdominal pain (prior day): 0 (None)  Number of liquid or soft stools (prior day): 0 (1 point per stool)  Abdominal mass on exam: 0 (None)  Complications (1 point for each):   None    Remission <5  Mild activity 5-7  Moderate activity 8-16  Severe > 16    Extra intestinal manifestations of IBD:  No uveitis/episcleritis  No aphthous ulcers   No arthritis   No erythema nodosum/pyoderma gangrenosum.     Interval history, 11/2023  Had anemia shortly after having a 2 week period in the setting of an IUD. After IUD was removed, periods have normalized. Most recent Hgb in July 14.5.     HBI:  Overall patient well being (prior day): 0 (Very well)  Abdominal pain (prior day): 0 (None)  Number of liquid or soft stools (prior day): 0 (1 point per stool)  Abdominal mass on exam: 0 (None)  Complications (1 point for each): None    Interval history, 2/2024  Feeling well today. No GI complaints. No waning effect on Stelara q6w.     Going to [place] next month.    HBI:  Overall patient well being (prior day): 0 (Very well)  Abdominal pain (prior day): 0 (None)  Number of liquid or soft stools (prior day): 0 (1 point per stool)  Abdominal mass on exam: 0 (None)  Complications (1 point for each):   None    Interval history, 11/2024  Summer some bloating, occasional constipation.  Recently diagnosed with cataracts. Still taking every 6 weeks. Actively trying to conceive. Saw a fertility specialist.     HBI:  Overall patient well being (prior day): 0 (Very well)  Abdominal pain (prior day): 0 (None)  Number of liquid or soft stools (prior day): 0 (1 point per stool)  Abdominal mass on exam: 0 (None)  Complications (1 point for each):   None    Extra intestinal manifestations of IBD:  No uveitis/episcleritis  No aphthous ulcers   No arthritis   No erythema nodosum/pyoderma gangrenosum.     ROS:  Constitutional, HEENT, cardiovascular, pulmonary, GI, , musculoskeletal, neuro, skin, endocrine and psych systems are negative, except as otherwise noted.     PERTINENT PAST MEDICAL HISTORY:  Past Medical History:   Diagnosis Date     Abnormal Pap smear of cervix 2016     Autoimmune disease (H) 2012    Crohns     Crohn's disease (H) 04/08/2021     Hypertension 12/2020    Unsure it previously caused by steroid meds     SBO (small bowel obstruction) (H) 2020     Uncomplicated asthma     Used inhaler in childhood, no issues as adult       PREVIOUS SURGERIES:  Past Surgical History:   Procedure Laterality Date     COLONOSCOPY       COLONOSCOPY N/A 11/30/2022    Procedure: COLONOSCOPY, WITH BIOPSY;  Surgeon: Shiv Jones MD;  Location: [facility] OR     EGD       NH ABLATE HEART DYSRHYTHM FOCUS  1993     ALLERGIES:     Allergies   Allergen Reactions     Cats Cough     Other Environmental Allergy Itching and Visual Disturbance       PERTINENT MEDICATIONS:    Current Outpatient Medications:      Prenatal Vit-Fe Fumarate-FA (PRENATAL MULTIVITAMIN  PLUS IRON) 27-1 MG TABS, Take 1 tablet by mouth daily Ask patient what brand using, Disp: , Rfl:      ustekinumab (STELARA) 90 MG/ML, INJECT 1 ML (90 MG) UNDER THE SKIN EVERY 6 WEEKS., Disp: 1 mL, Rfl: 3    SOCIAL HISTORY:  Social History     Socioeconomic History     Marital status:      Spouse name: Not on file     Number of children: Not on file      "Years of education: Not on file     Highest education level: Not on file   Occupational History     Not on file   Tobacco Use     Smoking status: Never     Smokeless tobacco: Never   Substance and Sexual Activity     Alcohol use: Yes     Comment: occasional     Drug use: Never     Sexual activity: Yes     Partners: Male     Birth control/protection: Condom, None   Other Topics Concern     Parent/sibling w/ CABG, MI or angioplasty before 65F 55M? No   Social History Narrative     Not on file     Social Drivers of Health     Financial Resource Strain: Not on file   Food Insecurity: Not on file   Transportation Needs: Not on file   Physical Activity: Not on file   Stress: Not on file   Social Connections: Not on file   Interpersonal Safety: Not on file   Housing Stability: Not on file       FAMILY HISTORY:  Cousin has UC  Family History   Problem Relation Age of Onset     Other - See Comments Mother         Hysterectomy     Hypertension Father      Cerebrovascular Disease Maternal Grandmother 50     Colon Cancer Maternal Grandfather         60s     Melanoma Maternal Grandfather      Diabetes Type 1 Paternal Grandmother      Other - See Comments Paternal Grandmother         uterine prolapse     Heart Disease Paternal Grandfather 50        Heart attack     Hypertension Cousin        Past/family/social history reviewed and no changes    PHYSICAL EXAMINATION:  Constitutional: aaox3, cooperative, pleasant, not dyspneic/diaphoretic, no acute distress  Vitals reviewed: BP (!) 137/94   Pulse 89   Ht 1.626 m (5' 4\")   Wt 64.7 kg (142 lb 11.2 oz)   SpO2 99%   BMI 24.49 kg/m    Wt:   Wt Readings from Last 2 Encounters:   11/26/24 64.7 kg (142 lb 11.2 oz)   02/27/24 61.6 kg (135 lb 14.4 oz)      Constitutional - general appearance is well and in no acute distress. Body habitus normal  Eyes - No redness or discharge  Respiratory - No cough, unlabored breathing  Abdomen - Non tender to palpation, no rebound or guarding  Skin - " No discoloration or lesions  Neurological - No tremors  Psychiatric - No anxiety, alert & oriented      Attestation signed by Earline Ponce MD at 11/26/2024 10:54 PM:  I performed a history and physical examination of the above patient and discussed the management with Dr. Petit on 11/26/2024. I reviewed the note and there are no changes to the past medical, family or social history.  A complete 10 point review of systems was obtained. Please see the HPI for pertinent positives and negatives. All other systems were reviewed and were found to be negative.     I agree with the documented findings and plan of care as outlined.    Earline Ponce MD  GI Attending  Pager: [phone]      Again, thank you for allowing me to participate in the care of your patient.        Sincerely,        Earline Ponce MD

## 2024-11-26 NOTE — PROGRESS NOTES
IBD CLINIC VISIT     CC/REFERRING MD:  Dr. Akin Bernal  REASON FOR FOLLOW UP: Crohn's    ASSESSMENT/PLAN  34 year old female with Crohn's disease of the small bowel (ileum and likely jejunum)    1. Small bowel crohn's disease:    Current medication:    Ustekinumab every 8 weeks (started 5/2021)   Ustekinumab every 6 weeks (Spring 2022)  Current clinical disease activity: remission, HBI 0   Last endoscopic disease activity: 11/30/22: SES-CD: 0  Last radiographic disease assessment: MRE 1/17/22 with moderate segmental length of ileum (10cm approx) with diffuse wall thickening consistent with acute and chronic inflammation.    Continues to be in clinical remission at this time. Last colonoscopy 11/2022 without any detectable inflammation on ustekinumab every 6 weeks. Plan to continue this regimen for maintenance of remission.     Did discuss conceiving in the setting of Crohn's disease. Explained that most medications from a fertility stand point are okay to use in the setting of Crohn's disease. Previously completed consultation in [facility] clinic and offered repeat visit today if continued concerns arise. Otherwise advised to return to clinic or reach out to the clinic with any concerns if needed.     PLAN  --Continue Stelara every 6 weeks  --------------Monitoring labs every 3 months  ---Let us know when you're pregnant so we can adjust your Stelara schedule  ---MTM referral    Colon cancer screening:  Given disease is limited to small bowel, colon cancer screening is recommended at age 45.     Misc:  -- Avoid tobacco use  -- Avoid NSAIDs as there is potentially a 25% chance of causing an IBD flare    RTC 6 months    Patient seen with and care plan discussed with Dr. Ponce.    Griselda Petit MD  GI service,  PGY 3    CROHN'S HISTORY:  Age at diagnosis: 2012  Extent of disease: small bowel   Disease phenotype: inflammatory  Chayito-anal disease: none  Prior IBD surgeries: none  Prior IBD Medications:    50 mg  6MP    DRUG MONITORING  TPMT enzyme activity: 40.4 (6/14/2012)    6-TGN/6-MMPN levels: --    Biologic concentration:  Usteinumab 2/2022 = 1.6, moved to every 6 weeks     HPI:   Here for follow-up.   Getting  in 2 weeks!!  Bowel pattern is 1 BM daily. Occasional constipation and bloating.  Nutrition goal: tries to be gluten free.      HBI:  Overall patient well being (prior day): 0 (Very well)  Abdominal pain (prior day): 0 (None)  Number of liquid or soft stools (prior day): 0 (1 point per stool)  Abdominal mass on exam: 0 (None)  Complications (1 point for each):   None    Remission <5  Mild activity 5-7  Moderate activity 8-16  Severe > 16    Extra intestinal manifestations of IBD:  No uveitis/episcleritis  No aphthous ulcers   No arthritis   No erythema nodosum/pyoderma gangrenosum.     Interval history, 11/2023  Had anemia shortly after having a 2 week period in the setting of an IUD. After IUD was removed, periods have normalized. Most recent Hgb in July 14.5.     HBI:  Overall patient well being (prior day): 0 (Very well)  Abdominal pain (prior day): 0 (None)  Number of liquid or soft stools (prior day): 0 (1 point per stool)  Abdominal mass on exam: 0 (None)  Complications (1 point for each): None    Interval history, 2/2024  Feeling well today. No GI complaints. No waning effect on Stelara q6w.     Going to [place] next month.    HBI:  Overall patient well being (prior day): 0 (Very well)  Abdominal pain (prior day): 0 (None)  Number of liquid or soft stools (prior day): 0 (1 point per stool)  Abdominal mass on exam: 0 (None)  Complications (1 point for each):   None    Interval history, 11/2024  Summer some bloating, occasional constipation. Recently diagnosed with cataracts. Still taking every 6 weeks. Actively trying to conceive. Saw a fertility specialist.     HBI:  Overall patient well being (prior day): 0 (Very well)  Abdominal pain (prior day): 0 (None)  Number of liquid or soft stools  (prior day): 0 (1 point per stool)  Abdominal mass on exam: 0 (None)  Complications (1 point for each):   None    Extra intestinal manifestations of IBD:  No uveitis/episcleritis  No aphthous ulcers   No arthritis   No erythema nodosum/pyoderma gangrenosum.     ROS:  Constitutional, HEENT, cardiovascular, pulmonary, GI, , musculoskeletal, neuro, skin, endocrine and psych systems are negative, except as otherwise noted.     PERTINENT PAST MEDICAL HISTORY:  Past Medical History:   Diagnosis Date    Abnormal Pap smear of cervix 2016    Autoimmune disease (H) 2012    Crohns    Crohn's disease (H) 04/08/2021    Hypertension 12/2020    Unsure it previously caused by steroid meds    SBO (small bowel obstruction) (H) 2020    Uncomplicated asthma     Used inhaler in childhood, no issues as adult       PREVIOUS SURGERIES:  Past Surgical History:   Procedure Laterality Date    COLONOSCOPY      COLONOSCOPY N/A 11/30/2022    Procedure: COLONOSCOPY, WITH BIOPSY;  Surgeon: Shiv Jones MD;  Location: [facility] OR    EGD      AR ABLATE HEART DYSRHYTHM FOCUS  1993     ALLERGIES:     Allergies   Allergen Reactions    Cats Cough    Other Environmental Allergy Itching and Visual Disturbance       PERTINENT MEDICATIONS:    Current Outpatient Medications:     Prenatal Vit-Fe Fumarate-FA (PRENATAL MULTIVITAMIN  PLUS IRON) 27-1 MG TABS, Take 1 tablet by mouth daily Ask patient what brand using, Disp: , Rfl:     ustekinumab (STELARA) 90 MG/ML, INJECT 1 ML (90 MG) UNDER THE SKIN EVERY 6 WEEKS., Disp: 1 mL, Rfl: 3    SOCIAL HISTORY:  Social History     Socioeconomic History    Marital status:      Spouse name: Not on file    Number of children: Not on file    Years of education: Not on file    Highest education level: Not on file   Occupational History    Not on file   Tobacco Use    Smoking status: Never    Smokeless tobacco: Never   Substance and Sexual Activity    Alcohol use: Yes     Comment: occasional    Drug use: Never     "Sexual activity: Yes     Partners: Male     Birth control/protection: Condom, None   Other Topics Concern    Parent/sibling w/ CABG, MI or angioplasty before 65F 55M? No   Social History Narrative    Not on file     Social Drivers of Health     Financial Resource Strain: Not on file   Food Insecurity: Not on file   Transportation Needs: Not on file   Physical Activity: Not on file   Stress: Not on file   Social Connections: Not on file   Interpersonal Safety: Not on file   Housing Stability: Not on file       FAMILY HISTORY:  Cousin has UC  Family History   Problem Relation Age of Onset    Other - See Comments Mother         Hysterectomy    Hypertension Father     Cerebrovascular Disease Maternal Grandmother 50    Colon Cancer Maternal Grandfather         60s    Melanoma Maternal Grandfather     Diabetes Type 1 Paternal Grandmother     Other - See Comments Paternal Grandmother         uterine prolapse    Heart Disease Paternal Grandfather 50        Heart attack    Hypertension Cousin        Past/family/social history reviewed and no changes    PHYSICAL EXAMINATION:  Constitutional: aaox3, cooperative, pleasant, not dyspneic/diaphoretic, no acute distress  Vitals reviewed: BP (!) 137/94   Pulse 89   Ht 1.626 m (5' 4\")   Wt 64.7 kg (142 lb 11.2 oz)   SpO2 99%   BMI 24.49 kg/m    Wt:   Wt Readings from Last 2 Encounters:   11/26/24 64.7 kg (142 lb 11.2 oz)   02/27/24 61.6 kg (135 lb 14.4 oz)      Constitutional - general appearance is well and in no acute distress. Body habitus normal  Eyes - No redness or discharge  Respiratory - No cough, unlabored breathing  Abdomen - Non tender to palpation, no rebound or guarding  Skin - No discoloration or lesions  Neurological - No tremors  Psychiatric - No anxiety, alert & oriented    "

## 2024-11-26 NOTE — NURSING NOTE
"Chief Complaint   Patient presents with    Follow Up       Vitals:    11/26/24 1331   BP: (!) 137/94   Pulse: 89   SpO2: 99%   Weight: 64.7 kg (142 lb 11.2 oz)   Height: 1.626 m (5' 4\")       Body mass index is 24.49 kg/m .    Tomasa Law MA      "

## 2024-12-02 ENCOUNTER — TELEPHONE (OUTPATIENT)
Dept: GASTROENTEROLOGY | Facility: CLINIC | Age: 34
End: 2024-12-02
Payer: COMMERCIAL

## 2024-12-02 NOTE — TELEPHONE ENCOUNTER
MTM referral from: [facility] visit (referral by provider)    MTM referral outreach attempt #2 on December 2, 2024 at 11:42 AM      Outcome: Patient not reachable after several attempts, routed to Pharmacist Team/Provider as an FYI    Use hbc for the carrier/Plan on the flowsheet      wiMANt Message Sent    Margaret Robbins CPhT  MTM

## 2024-12-10 NOTE — TELEPHONE ENCOUNTER
MTM Referral outreach attempt #3 was made on 12/10/2024.    Outcome: Called patient since they have not responded to the [employer] messages. Patient did not answer. Left voicemail explaining that they are due for a follow up with our MTM pharmacist. Provided the scheduling teams phone number and offered opportunity to call me directly for help with scheduling.

## 2025-03-13 ENCOUNTER — APPOINTMENT (OUTPATIENT)
Dept: CT IMAGING | Facility: CLINIC | Age: 35
End: 2025-03-13
Attending: EMERGENCY MEDICINE
Payer: COMMERCIAL

## 2025-03-13 ENCOUNTER — HOSPITAL ENCOUNTER (INPATIENT)
Facility: CLINIC | Age: 35
End: 2025-03-13
Attending: EMERGENCY MEDICINE | Admitting: INTERNAL MEDICINE
Payer: COMMERCIAL

## 2025-03-13 ENCOUNTER — APPOINTMENT (OUTPATIENT)
Dept: GENERAL RADIOLOGY | Facility: CLINIC | Age: 35
End: 2025-03-13
Attending: EMERGENCY MEDICINE
Payer: COMMERCIAL

## 2025-03-13 VITALS
OXYGEN SATURATION: 95 % | BODY MASS INDEX: 23.17 KG/M2 | HEART RATE: 106 BPM | SYSTOLIC BLOOD PRESSURE: 141 MMHG | RESPIRATION RATE: 18 BRPM | TEMPERATURE: 98.1 F | WEIGHT: 135 LBS | DIASTOLIC BLOOD PRESSURE: 96 MMHG

## 2025-03-13 DIAGNOSIS — Z87.19 HX OF CROHN'S DISEASE: ICD-10-CM

## 2025-03-13 DIAGNOSIS — K56.609 SBO (SMALL BOWEL OBSTRUCTION) (H): ICD-10-CM

## 2025-03-13 LAB
ALBUMIN SERPL BCG-MCNC: 4.5 G/DL (ref 3.5–5.2)
ALBUMIN UR-MCNC: NEGATIVE MG/DL
ALP SERPL-CCNC: 50 U/L (ref 40–150)
ALT SERPL W P-5'-P-CCNC: 13 U/L (ref 0–50)
ANION GAP SERPL CALCULATED.3IONS-SCNC: 10 MMOL/L (ref 7–15)
APPEARANCE UR: CLEAR
AST SERPL W P-5'-P-CCNC: 20 U/L (ref 0–45)
BASOPHILS # BLD AUTO: 0.1 10E3/UL (ref 0–0.2)
BASOPHILS NFR BLD AUTO: 0 %
BILIRUB SERPL-MCNC: 0.4 MG/DL
BILIRUB UR QL STRIP: NEGATIVE
BUN SERPL-MCNC: 11.7 MG/DL (ref 6–20)
CALCIUM SERPL-MCNC: 9.8 MG/DL (ref 8.8–10.4)
CHLORIDE SERPL-SCNC: 103 MMOL/L (ref 98–107)
COLOR UR AUTO: ABNORMAL
CREAT SERPL-MCNC: 0.57 MG/DL (ref 0.51–0.95)
CRP SERPL-MCNC: <3 MG/L
EGFRCR SERPLBLD CKD-EPI 2021: >90 ML/MIN/1.73M2
EOSINOPHIL # BLD AUTO: 0 10E3/UL (ref 0–0.7)
EOSINOPHIL NFR BLD AUTO: 0 %
ERYTHROCYTE [DISTWIDTH] IN BLOOD BY AUTOMATED COUNT: 12.3 % (ref 10–15)
GLUCOSE SERPL-MCNC: 115 MG/DL (ref 70–99)
GLUCOSE UR STRIP-MCNC: NEGATIVE MG/DL
HCG SERPL QL: NEGATIVE
HCO3 SERPL-SCNC: 24 MMOL/L (ref 22–29)
HCT VFR BLD AUTO: 45 % (ref 35–47)
HGB BLD-MCNC: 12.9 G/DL (ref 11.7–15.7)
HGB BLD-MCNC: 13.5 G/DL (ref 11.7–15.7)
HGB BLD-MCNC: 15.7 G/DL (ref 11.7–15.7)
HGB UR QL STRIP: NEGATIVE
IMM GRANULOCYTES # BLD: 0.1 10E3/UL
IMM GRANULOCYTES NFR BLD: 1 %
KETONES UR STRIP-MCNC: 100 MG/DL
LEUKOCYTE ESTERASE UR QL STRIP: NEGATIVE
LIPASE SERPL-CCNC: 32 U/L (ref 13–60)
LYMPHOCYTES # BLD AUTO: 0.6 10E3/UL (ref 0.8–5.3)
LYMPHOCYTES NFR BLD AUTO: 4 %
MAGNESIUM SERPL-MCNC: 2 MG/DL (ref 1.7–2.3)
MCH RBC QN AUTO: 30.4 PG (ref 26.5–33)
MCHC RBC AUTO-ENTMCNC: 34.9 G/DL (ref 31.5–36.5)
MCV RBC AUTO: 87 FL (ref 78–100)
MONOCYTES # BLD AUTO: 0.3 10E3/UL (ref 0–1.3)
MONOCYTES NFR BLD AUTO: 3 %
MUCOUS THREADS #/AREA URNS LPF: PRESENT /LPF
NEUTROPHILS # BLD AUTO: 12 10E3/UL (ref 1.6–8.3)
NEUTROPHILS NFR BLD AUTO: 92 %
NITRATE UR QL: NEGATIVE
NRBC # BLD AUTO: 0 10E3/UL
NRBC BLD AUTO-RTO: 0 /100
PH UR STRIP: 7.5 [PH] (ref 5–7)
PHOSPHATE SERPL-MCNC: 3.1 MG/DL (ref 2.5–4.5)
PLATELET # BLD AUTO: 311 10E3/UL (ref 150–450)
POTASSIUM SERPL-SCNC: 4.1 MMOL/L (ref 3.4–5.3)
PROT SERPL-MCNC: 7.4 G/DL (ref 6.4–8.3)
RBC # BLD AUTO: 5.16 10E6/UL (ref 3.8–5.2)
RBC URINE: 1 /HPF
SODIUM SERPL-SCNC: 137 MMOL/L (ref 135–145)
SP GR UR STRIP: 1 (ref 1–1.03)
SQUAMOUS EPITHELIAL: <1 /HPF
UROBILINOGEN UR STRIP-MCNC: NORMAL MG/DL
WBC # BLD AUTO: 13.1 10E3/UL (ref 4–11)
WBC URINE: 2 /HPF

## 2025-03-13 PROCEDURE — 99207 PR APP CREDIT; MD BILLING SHARED VISIT: CPT | Mod: FS

## 2025-03-13 PROCEDURE — 258N000003 HC RX IP 258 OP 636: Performed by: EMERGENCY MEDICINE

## 2025-03-13 PROCEDURE — 83735 ASSAY OF MAGNESIUM: CPT | Performed by: PHYSICIAN ASSISTANT

## 2025-03-13 PROCEDURE — 250N000011 HC RX IP 250 OP 636: Mod: JZ | Performed by: EMERGENCY MEDICINE

## 2025-03-13 PROCEDURE — 74177 CT ABD & PELVIS W/CONTRAST: CPT

## 2025-03-13 PROCEDURE — 250N000011 HC RX IP 250 OP 636: Performed by: PHYSICIAN ASSISTANT

## 2025-03-13 PROCEDURE — 250N000009 HC RX 250: Performed by: EMERGENCY MEDICINE

## 2025-03-13 PROCEDURE — 99254 IP/OBS CNSLTJ NEW/EST MOD 60: CPT | Mod: FS | Performed by: COLON & RECTAL SURGERY

## 2025-03-13 PROCEDURE — 999N000065 XR ABDOMEN PORT 1 VIEW

## 2025-03-13 PROCEDURE — 99418 PROLNG IP/OBS E/M EA 15 MIN: CPT | Performed by: PHYSICIAN ASSISTANT

## 2025-03-13 PROCEDURE — 36415 COLL VENOUS BLD VENIPUNCTURE: CPT | Performed by: EMERGENCY MEDICINE

## 2025-03-13 PROCEDURE — 85004 AUTOMATED DIFF WBC COUNT: CPT | Performed by: EMERGENCY MEDICINE

## 2025-03-13 PROCEDURE — 99285 EMERGENCY DEPT VISIT HI MDM: CPT | Mod: 25

## 2025-03-13 PROCEDURE — 84703 CHORIONIC GONADOTROPIN ASSAY: CPT | Performed by: EMERGENCY MEDICINE

## 2025-03-13 PROCEDURE — 96374 THER/PROPH/DIAG INJ IV PUSH: CPT

## 2025-03-13 PROCEDURE — 96375 TX/PRO/DX INJ NEW DRUG ADDON: CPT

## 2025-03-13 PROCEDURE — 84155 ASSAY OF PROTEIN SERUM: CPT | Performed by: EMERGENCY MEDICINE

## 2025-03-13 PROCEDURE — 96361 HYDRATE IV INFUSION ADD-ON: CPT

## 2025-03-13 PROCEDURE — 86140 C-REACTIVE PROTEIN: CPT | Performed by: PHYSICIAN ASSISTANT

## 2025-03-13 PROCEDURE — 84100 ASSAY OF PHOSPHORUS: CPT | Performed by: INTERNAL MEDICINE

## 2025-03-13 PROCEDURE — 258N000003 HC RX IP 258 OP 636: Performed by: PHYSICIAN ASSISTANT

## 2025-03-13 PROCEDURE — 83690 ASSAY OF LIPASE: CPT | Performed by: EMERGENCY MEDICINE

## 2025-03-13 PROCEDURE — 250N000011 HC RX IP 250 OP 636: Performed by: EMERGENCY MEDICINE

## 2025-03-13 PROCEDURE — 120N000001 HC R&B MED SURG/OB

## 2025-03-13 PROCEDURE — 36415 COLL VENOUS BLD VENIPUNCTURE: CPT | Performed by: PHYSICIAN ASSISTANT

## 2025-03-13 PROCEDURE — 99223 1ST HOSP IP/OBS HIGH 75: CPT | Performed by: PHYSICIAN ASSISTANT

## 2025-03-13 PROCEDURE — 85018 HEMOGLOBIN: CPT | Performed by: PHYSICIAN ASSISTANT

## 2025-03-13 PROCEDURE — 81003 URINALYSIS AUTO W/O SCOPE: CPT | Performed by: EMERGENCY MEDICINE

## 2025-03-13 PROCEDURE — 99255 IP/OBS CONSLTJ NEW/EST HI 80: CPT | Performed by: PHYSICIAN ASSISTANT

## 2025-03-13 RX ORDER — ONDANSETRON 2 MG/ML
4 INJECTION INTRAMUSCULAR; INTRAVENOUS ONCE
Status: COMPLETED | OUTPATIENT
Start: 2025-03-13 | End: 2025-03-13

## 2025-03-13 RX ORDER — LIDOCAINE 40 MG/G
CREAM TOPICAL
Status: DISCONTINUED | OUTPATIENT
Start: 2025-03-13 | End: 2025-03-15 | Stop reason: HOSPADM

## 2025-03-13 RX ORDER — ACETAMINOPHEN 650 MG/1
650 SUPPOSITORY RECTAL EVERY 4 HOURS PRN
Status: DISCONTINUED | OUTPATIENT
Start: 2025-03-13 | End: 2025-03-15 | Stop reason: HOSPADM

## 2025-03-13 RX ORDER — PROCHLORPERAZINE MALEATE 10 MG
10 TABLET ORAL EVERY 6 HOURS PRN
Status: DISCONTINUED | OUTPATIENT
Start: 2025-03-13 | End: 2025-03-15 | Stop reason: HOSPADM

## 2025-03-13 RX ORDER — HYDRALAZINE HYDROCHLORIDE 20 MG/ML
10 INJECTION INTRAMUSCULAR; INTRAVENOUS EVERY 4 HOURS PRN
Status: DISCONTINUED | OUTPATIENT
Start: 2025-03-13 | End: 2025-03-15 | Stop reason: HOSPADM

## 2025-03-13 RX ORDER — ONDANSETRON 4 MG/1
4 TABLET, ORALLY DISINTEGRATING ORAL EVERY 6 HOURS PRN
Status: DISCONTINUED | OUTPATIENT
Start: 2025-03-13 | End: 2025-03-15 | Stop reason: HOSPADM

## 2025-03-13 RX ORDER — LACTOBACILLUS RHAMNOSUS GG 10B CELL
1 CAPSULE ORAL DAILY
COMMUNITY

## 2025-03-13 RX ORDER — HYDRALAZINE HYDROCHLORIDE 10 MG/1
10 TABLET, FILM COATED ORAL EVERY 4 HOURS PRN
Status: DISCONTINUED | OUTPATIENT
Start: 2025-03-13 | End: 2025-03-15 | Stop reason: HOSPADM

## 2025-03-13 RX ORDER — ONDANSETRON 2 MG/ML
4 INJECTION INTRAMUSCULAR; INTRAVENOUS EVERY 6 HOURS PRN
Status: DISCONTINUED | OUTPATIENT
Start: 2025-03-13 | End: 2025-03-15 | Stop reason: HOSPADM

## 2025-03-13 RX ORDER — MORPHINE SULFATE 2 MG/ML
1 INJECTION, SOLUTION INTRAMUSCULAR; INTRAVENOUS
Status: DISCONTINUED | OUTPATIENT
Start: 2025-03-13 | End: 2025-03-15 | Stop reason: HOSPADM

## 2025-03-13 RX ORDER — ACETAMINOPHEN 500 MG
500-1000 TABLET ORAL EVERY 6 HOURS PRN
COMMUNITY

## 2025-03-13 RX ORDER — HYDROMORPHONE HYDROCHLORIDE 1 MG/ML
0.5 INJECTION, SOLUTION INTRAMUSCULAR; INTRAVENOUS; SUBCUTANEOUS
Status: DISCONTINUED | OUTPATIENT
Start: 2025-03-13 | End: 2025-03-13

## 2025-03-13 RX ORDER — MORPHINE SULFATE 2 MG/ML
2 INJECTION, SOLUTION INTRAMUSCULAR; INTRAVENOUS
Status: DISCONTINUED | OUTPATIENT
Start: 2025-03-13 | End: 2025-03-15 | Stop reason: HOSPADM

## 2025-03-13 RX ORDER — IOPAMIDOL 755 MG/ML
71 INJECTION, SOLUTION INTRAVASCULAR ONCE
Status: COMPLETED | OUTPATIENT
Start: 2025-03-13 | End: 2025-03-13

## 2025-03-13 RX ORDER — SODIUM CHLORIDE, SODIUM LACTATE, POTASSIUM CHLORIDE, CALCIUM CHLORIDE 600; 310; 30; 20 MG/100ML; MG/100ML; MG/100ML; MG/100ML
INJECTION, SOLUTION INTRAVENOUS CONTINUOUS
Status: DISCONTINUED | OUTPATIENT
Start: 2025-03-13 | End: 2025-03-15

## 2025-03-13 RX ADMIN — SODIUM CHLORIDE, POTASSIUM CHLORIDE, SODIUM LACTATE AND CALCIUM CHLORIDE: 600; 310; 30; 20 INJECTION, SOLUTION INTRAVENOUS at 15:42

## 2025-03-13 RX ADMIN — SODIUM CHLORIDE 1000 ML: 0.9 INJECTION, SOLUTION INTRAVENOUS at 08:57

## 2025-03-13 RX ADMIN — SODIUM CHLORIDE 60 ML: 9 INJECTION, SOLUTION INTRAVENOUS at 09:58

## 2025-03-13 RX ADMIN — SODIUM CHLORIDE, SODIUM LACTATE, POTASSIUM CHLORIDE, AND CALCIUM CHLORIDE 1000 ML: .6; .31; .03; .02 INJECTION, SOLUTION INTRAVENOUS at 16:10

## 2025-03-13 RX ADMIN — HYDROMORPHONE HYDROCHLORIDE 0.5 MG: 1 INJECTION, SOLUTION INTRAMUSCULAR; INTRAVENOUS; SUBCUTANEOUS at 08:58

## 2025-03-13 RX ADMIN — ONDANSETRON 4 MG: 2 INJECTION, SOLUTION INTRAMUSCULAR; INTRAVENOUS at 08:58

## 2025-03-13 RX ADMIN — MORPHINE SULFATE 1 MG: 2 INJECTION, SOLUTION INTRAMUSCULAR; INTRAVENOUS at 20:53

## 2025-03-13 RX ADMIN — IOPAMIDOL 71 ML: 755 INJECTION, SOLUTION INTRAVENOUS at 09:58

## 2025-03-13 ASSESSMENT — ACTIVITIES OF DAILY LIVING (ADL)
ADLS_ACUITY_SCORE: 41
ADLS_ACUITY_SCORE: 18
ADLS_ACUITY_SCORE: 41
ADLS_ACUITY_SCORE: 18
ADLS_ACUITY_SCORE: 41
ADLS_ACUITY_SCORE: 41
ADLS_ACUITY_SCORE: 18
ADLS_ACUITY_SCORE: 41
ADLS_ACUITY_SCORE: 18
ADLS_ACUITY_SCORE: 41

## 2025-03-13 NOTE — PROGRESS NOTES
RECEIVING UNIT ED HANDOFF REVIEW    ED Nurse Handoff Report was reviewed by: Carol Nazario RN on March 13, 2025 at 3:07 PM

## 2025-03-13 NOTE — PROGRESS NOTES
General Surgery Progress Note:    Contacted by ED about patient. Marybel Redd is a 34 year old female with known Crohn's disease who is presenting with recurrent SBO 2/2 inflamed terminal ileum. Her distal small bowel is quite distended on CT. Recommend NG tube placement in the ED.     Consulted colorectal surgery for further management of Crohn's inflammation. Patient is also followed by [facility]. Recommend consulting them.    Anthony Nicole MD

## 2025-03-13 NOTE — PHARMACY-ADMISSION MEDICATION HISTORY
Pharmacy Intern Admission Medication History    Admission medication history is complete. The information provided in this note is only as accurate as the sources available at the time of the update.    Information Source(s): Patient and CareEverywhere/SureScripts via in-person    Pertinent Information: Patient reports occasionally taking prenatal and probiotic when she remembers. She states she does not remember the brands of these. She uses Pataday eye drops for seasonal allergies. She receives Sterlara injections every 6 weeks and received last injection on 2/25/25.    Changes made to PTA medication list:  Added: Tylenol, probiotic, Pataday  Deleted: None  Changed: None    Allergies reviewed with patient and updates made in EHR: no    Medication History Completed By: Liudmila Zepeda 3/13/2025 11:17 AM    PTA Med List   Medication Sig Last Dose/Taking    acetaminophen (TYLENOL) 500 MG tablet Take 500-1,000 mg by mouth every 6 hours as needed for mild pain or pain. 3/12/2025 Evening    lactobacillus rhamnosus, GG, (CULTURELL) capsule Take 1 capsule by mouth daily. More than a month    Olopatadine HCl (PATADAY OP) Apply 1 drop to eye as needed (seasonal allergies). More than a month    Prenatal Vit-Fe Fumarate-FA (PRENATAL MULTIVITAMIN  PLUS IRON) 27-1 MG TABS Take 1 tablet by mouth daily. Unknown    ustekinumab (STELARA) 90 MG/ML INJECT 1 ML (90 MG) UNDER THE SKIN EVERY 6 WEEKS. 2/25/2025 Morning

## 2025-03-13 NOTE — ED PROVIDER NOTES
Emergency Department Note      History of Present Illness     Chief Complaint   Abdominal Pain      HPI   Marybel Redd is a 34 year old female with a history of Crohn's disease who presents with vomiting and left-sided abdominal pain since yesterday.  Patient has had multiple episodes of vomiting overnight including a black appearance to her vomit.  She denies any prior history of GI bleeds.  She is not on blood thinner medications.  She also notes some urinary frequency but no dysuria.  Denies any right-sided abdominal pain.  Denies any prior abdominal surgeries.  States that she is followed by Dr. Jones with [facility] gastroenterology.  She has had prior colonoscopies in the past.  Denies any recent fever.  Is still feeling nauseous and having pain.  States that she cannot take NSAID medications.  She has received steroids for Crohn's flares in the past but it has been several years since that time.  Given the persistence of nausea, vomiting and abdominal pain she presents today.    Independent Historian   None    Review of External Notes   None    Past Medical History     Medical History and Problem List   Past Medical History:   Diagnosis Date    Abnormal Pap smear of cervix 2016    Autoimmune disease 2012    Crohn's disease (H) 04/08/2021    Hypertension 12/2020    SBO (small bowel obstruction) (H) 2020    Uncomplicated asthma        Medications   acetaminophen (TYLENOL) 500 MG tablet  lactobacillus rhamnosus, GG, (CULTURELL) capsule  Olopatadine HCl (PATADAY OP)  Prenatal Vit-Fe Fumarate-FA (PRENATAL MULTIVITAMIN  PLUS IRON) 27-1 MG TABS  ustekinumab (STELARA) 90 MG/ML        Surgical History   Past Surgical History:   Procedure Laterality Date    COLONOSCOPY      COLONOSCOPY N/A 11/30/2022    Procedure: COLONOSCOPY, WITH BIOPSY;  Surgeon: Shiv Jones MD;  Location: [facility] OR    EGD      SC ABLATE HEART DYSRHYTHM FOCUS  1993       Physical Exam     Patient Vitals for the past 24 hrs:    BP Temp Temp src Pulse Resp SpO2   03/13/25 0820 (!) 137/94 98.3  F (36.8  C) Oral 105 16 98 %     Physical Exam  General: Alert, appears well-developed and well-nourished. Cooperative.     In mild distress  HEENT:  Head:  Atraumatic  Ears:  External ears are normal  Mouth/Throat:  Oropharynx is without erythema or exudate and mucous membranes are moist.   Eyes:   Conjunctivae normal and EOM are normal. No scleral icterus.  CV:  Tachycardic rate, regular rhythm, normal heart sounds and radial pulses are 2+ and symmetric.  No murmur.  Resp:  Breath sounds are clear bilaterally    Non-labored, no retractions or accessory muscle use  GI:  Abdomen is soft, no distension, LUQ tenderness. No rebound or guarding.  No CVA tenderness bilaterally  MS:  Normal range of motion. No edema.    Normal strength in all 4 extremities.     Back atraumatic.    No midline cervical, thoracic, or lumbar tenderness  Skin:  Warm and dry.  No rash or lesions noted.  Neuro:   Alert. Normal strength.  GCS: 15  Psych: Normal mood and affect.    Diagnostics     Lab Results   Labs Ordered and Resulted from Time of ED Arrival to Time of ED Departure   COMPREHENSIVE METABOLIC PANEL - Abnormal       Result Value    Sodium 137      Potassium 4.1      Carbon Dioxide (CO2) 24      Anion Gap 10      Urea Nitrogen 11.7      Creatinine 0.57      GFR Estimate >90      Calcium 9.8      Chloride 103      Glucose 115 (*)     Alkaline Phosphatase 50      AST 20      ALT 13      Protein Total 7.4      Albumin 4.5      Bilirubin Total 0.4     ROUTINE UA WITH MICROSCOPIC REFLEX TO CULTURE - Abnormal    Color Urine Light Yellow      Appearance Urine Clear      Glucose Urine Negative      Bilirubin Urine Negative      Ketones Urine 100 (*)     Specific Gravity Urine 1.005      Blood Urine Negative      pH Urine 7.5 (*)     Protein Albumin Urine Negative      Urobilinogen Urine Normal      Nitrite Urine Negative      Leukocyte Esterase Urine Negative      Mucus Urine  Present (*)     RBC Urine 1      WBC Urine 2      Squamous Epithelials Urine <1     CBC WITH PLATELETS AND DIFFERENTIAL - Abnormal    WBC Count 13.1 (*)     RBC Count 5.16      Hemoglobin 15.7      Hematocrit 45.0      MCV 87      MCH 30.4      MCHC 34.9      RDW 12.3      Platelet Count 311      % Neutrophils 92      % Lymphocytes 4      % Monocytes 3      % Eosinophils 0      % Basophils 0      % Immature Granulocytes 1      NRBCs per 100 WBC 0      Absolute Neutrophils 12.0 (*)     Absolute Lymphocytes 0.6 (*)     Absolute Monocytes 0.3      Absolute Eosinophils 0.0      Absolute Basophils 0.1      Absolute Immature Granulocytes 0.1      Absolute NRBCs 0.0     LIPASE - Normal    Lipase 32     HCG QUALITATIVE PREGNANCY - Normal    hCG Serum Qualitative Negative     CRP INFLAMMATION - Normal    CRP Inflammation <3.00         Imaging   XR Abdomen Port 1 View   Final Result   IMPRESSION: Enteric tube and side-port project over the left upper quadrant/stomach. Persistent dilated proximal small bowel loop measuring up to 4.7 cm. Contrast noted within the urinary bladder and collecting systems.      CT Abdomen Pelvis w Contrast   Final Result   IMPRESSION:    1.  Proximal/mid small bowel obstruction with transition point in the right lower quadrant due to inflammatory/Crohn's stricture.    2.  Subcentimeter small bowel posterior mucosal polyp just proximal to the transition point may be inflammatory, although consider attention on follow-up to exclude underlying polyp/mass.   3.  Additional findings as in the body of the report.          Independent Interpretation   None    ED Course      Medications Administered   Medications   HYDROmorphone (PF) (DILAUDID) injection 0.5 mg (0.5 mg Intravenous $Given 3/13/25 0858)   sodium chloride 0.9% BOLUS 1,000 mL (1,000 mLs Intravenous $New Bag 3/13/25 0857)   ondansetron (ZOFRAN) injection 4 mg (4 mg Intravenous $Given 3/13/25 0858)   Saline (60 mLs Intravenous $Given 3/13/25  0958)   iopamidol (ISOVUE-370) solution 71 mL (71 mLs Intravenous $Given 3/13/25 0958)       Procedures   Procedures     Discussion of Management   Admitting HospitalistJael Yang    ED Course   ED Course as of 03/13/25 1336   Thu Mar 13, 2025   1052 I spoke with Dr. Nicole of general surgery who recommended NG tube placement and she will further discuss the case with colorectal surgery.   110 I spoke to Radha Dodson on behalf of Dr. Elder       Additional Documentation  None    Medical Decision Making / Diagnosis     CMS Diagnoses: None    MIPS       None    MDM   Marybel Redd is a 34 year old female who presents for evaluation of vomiting and left upper quadrant abdominal pain.  She does have a history of Crohn's.  CT scan ultimately was obtained and unfortunately peers to show a small bowel obstruction with a transition point near an area of inflammation suspected Crohn's in the right lower quadrant.  Oddly this is not where the patient is having abdominal pain but there is significant proximal distention that would be aided by placement of the NG tube.  I spoke with Dr. Nicole with general surgery who will plan to consult on the patient after further discussing with colorectal surgery.  Thankfully patient has not had prior abdominal surgeries historically.  Patient will be admitted under the care of the hospitalist service for further management of small bowel obstruction.  Her nausea symptoms are improved after antiemetics and pain well-controlled after Dilaudid here in the emergency department.  Patient admitted under the care of the hospitalist service for further management.    Disposition   The patient was admitted to the hospital.     Diagnosis     ICD-10-CM    1. SBO (small bowel obstruction) (H)  K56.609       2. Hx of Crohn's disease  Z87.19            Discharge Medications   New Prescriptions    No medications on file         MD Ralph Rea Scott,  MD  03/13/25 5854

## 2025-03-13 NOTE — H&P
[facility]    History and Physical - Hospitalist Service       Date of Admission:  3/13/2025    Assessment & Plan   Marybel Redd is a 34 year old female medical history significant for Crohn's disease without recent flares, small bowel obstruction 2020, uncomplicated asthma, high blood pressure without diagnosis of hypertension, and cataract of the right eye who was admitted with small bowel obstruction.     Small bowel obstruction  Dark/Black emesis, rule out GI bleed  Leukocytosis   Crohn's Disease on Stelara, no recent flares  History of small bowel obstruction 2020  Presented with 1 day LUQ abd pain, N/V. Emesis has been dark/black in color, no black or bloody stools.  Was vomiting essentially every hour until she came into the ED and received antiemetics. Crohn's disease has been well-controlled without recent flares, stable on Stelara, follows with [facility]. Hx partial SBP in past. No hx abd surgery.   Workup in the ED revealed SBP. Leukocytosis 13.1.  CRP <3.  HCG neg. Lipase WNL. UA noninfectious appearing. CT A/P revealed proximal/mid small bowel obstruction with transition point in RLQ due to inflammatory/Crohn's stricture. Also notes, subcentimeter small bowel posterior mucosal polyp just proximal to the transition point may be inflammatory, although consider attention on follow-up to exclude underlying polyp/mass. Given 1L NS in the ED Along with antiemetics with relieved vomiting. Dr. Nicole of general surgery notified. No obvious infection, leukocytosis may be related to acute process, inflammation, stress demargination, emesis. Dark/black emesis may be related to retching.  -Inpatient status, Gen Surg or station 66 if bed available  -Place NGT to LIS, per gen surg recommendations  -Antiemetics, Analgesia  -Bowel rest, NPO, MIVF  -AROBF  -Trend Hgb with report of dark/black emesis  -General Surgery consulted and signed off - recommend [facility] and Colorectal  consultations  -Colorectal Surgery Consultation, appreciate assistance   -Conservative management   -NGT decompression and bowel rest.   -No indication for urgent surgery at this time. However, due to the inflammation in the small bowel causing this stricturing and recurrent obstructions, there is a possible this current may not resolve with conservative measures alone.   -Discussed surgical intervention in the future is warranted but would obviously prefer to do this on a semi-elective basis. If her pain and nausea persist or worsen, she may need operative intervention during this admission. This would entail a small bowel resection and a 5-7 day hospital stay. Regardless of operative timeline, diversion with creation of an ileostomy is unlikely.   -[facility] consult for assistance in management of her Crohn's disease and initiation of steroids if warranted.   -Of note, patient reports cataracts as possibly from steroid use in the past  -Trend basic labs  -Monitor clinically    Recent Labs   Lab 03/13/25  0857   HGB 15.7       Chronic stable diagnoses and other pertinent medical history: Appropriate PTA medications will be resumed.   Hx high blood pressure without any prior medications: reports has been elevated in the past, never been on meds, will need outpatient follow up  Uncomplicated Asthma        Diet:  NPO, NGT to LIS  DVT Prophylaxis: Pneumatic Compression Devices and Ambulate every shift  Dexter Catheter: Not present  Lines: None     Cardiac Monitoring: None  Code Status:  Full code    Clinically Significant Risk Factors Present on Admission                                        Disposition Plan     Medically Ready for Discharge: Anticipated in 2-4 Days         The patient's care was discussed with the Attending Physician, Dr. Elder, Patient, and Patient's Family, and ED Physician Dr. Rosas.    Teodora Garcia PA-C  Hospitalist Service  [facility]  Securely message with  [employer] (more info)  Text page via [facility] Paging/Directory     ______________________________________________________________________    Chief Complaint   Abd pain, N/V    History is obtained from the patient, electronic health record, emergency department physician, and patient's spouse    History of Present Illness   Marybel Redd is a 34 year old female medical history significant for Crohn's disease without recent flares, small bowel obstruction 2020, uncomplicated asthma, high blood pressure without diagnosis of hypertension, and cataract of the right eye who presented to the emergency department with 1 day of left upper quadrant abdominal pain with associated nausea and vomiting.  Patient reports her Crohn's disease has been well-controlled she has had no recent flares she has had a small bowel obstruction that was partial in the past.  She follows with [facility] gastroenterology.  She is on Stelara and last dose was February 25.  She reports her vomit has been dark/black in color, no black or bloody stools.  Was vomiting essentially every hour until she came into the ED around 6 AM. No prior abd surgeries.    In the ED she is afebrile, initial heart rate 105, blood pressure 137/94, no hypoxia.  Significant for leukocytosis 13.1.  CRP less than 3.  HCG neg. Lipase WNL. CMP and remainder of CBC unremarkable. UA noninfectious appearing. CT A/P revealed proximal/mid small bowel obstruction with transition point in RLQ due to inflammatory/Crohn's stricture. Also notes, subcentimeter small bowel posterior mucosal polyp just proximal to the transition point may be inflammatory, although consider attention on follow-up to exclude underlying polyp/mass. Given 1L NS in the ED Along with antiemetics with relieved vomiting. Dr. Nicole of general surgery notified.  NGT to be placed and she will notify colorectal surgery.    Past Medical History    Past Medical History:   Diagnosis Date    Abnormal Pap smear  of cervix 2016    Autoimmune disease 2012    Crohns    Crohn's disease (H) 04/08/2021    Hypertension 12/2020    Unsure it previously caused by steroid meds    SBO (small bowel obstruction) (H) 2020    Uncomplicated asthma     Used inhaler in childhood, no issues as adult       Past Surgical History   Past Surgical History:   Procedure Laterality Date    COLONOSCOPY      COLONOSCOPY N/A 11/30/2022    Procedure: COLONOSCOPY, WITH BIOPSY;  Surgeon: Shiv Jones MD;  Location: [facility] OR    EGD      DE ABLATE HEART DYSRHYTHM FOCUS  1993       Prior to Admission Medications   Prior to Admission Medications   Prescriptions Last Dose Informant Patient Reported? Taking?   Olopatadine HCl (PATADAY OP) More than a month  Yes Yes   Sig: Apply 1 drop to eye as needed (seasonal allergies).   Prenatal Vit-Fe Fumarate-FA (PRENATAL MULTIVITAMIN  PLUS IRON) 27-1 MG TABS More than a month  Yes Yes   Sig: Take 1 tablet by mouth daily.   acetaminophen (TYLENOL) 500 MG tablet 3/12/2025 Evening  Yes Yes   Sig: Take 500-1,000 mg by mouth every 6 hours as needed for mild pain or pain.   lactobacillus rhamnosus, GG, (CULTURELL) capsule More than a month  Yes Yes   Sig: Take 1 capsule by mouth daily.   ustekinumab (STELARA) 90 MG/ML 2/25/2025 Morning  No Yes   Sig: INJECT 1 ML (90 MG) UNDER THE SKIN EVERY 6 WEEKS.      Facility-Administered Medications: None        Review of Systems    The 10 point Review of Systems is negative other than noted in the HPI or here.     Social History   I have reviewed this patient's social history and updated it with pertinent information if needed.  Social History     Tobacco Use    Smoking status: Never    Smokeless tobacco: Never   Substance Use Topics    Alcohol use: Yes     Comment: occasional    Drug use: Never         Allergies   Allergies   Allergen Reactions    Cats Cough    Other Environmental Allergy Itching and Visual Disturbance        Physical Exam   Vital Signs: Temp: 98.3  F (36.8  C)  Temp src: Oral BP: (!) 137/94 Pulse: 105   Resp: 16 SpO2: 98 %      Weight: 0 lbs 0 oz    Physical Exam    General: Awake, alert, does not young woman who appears stated age. Looks comfortable sitting up in bed. No acute distress.  HEENT: Normocephalic, atraumatic. Extraocular movements intact.   Respiratory: Clear to auscultation bilaterally, no rales, wheezing, or rhonchi.  Cardiovascular: Mild tachycardia, regular rhythm, +S1 and S2, no murmur auscultated. No peripheral edema.   Gastrointestinal: Soft, mildly TTP to LUQ, non-distended. Bowel sounds absent to lower quadrants, hypoactive above.  Skin: Warm, dry. No obvious rashes or lesions on exposed skin. Dorsalis pedis pulses palpable bilaterally.  Musculoskeletal: No joint swelling, erythema or tenderness. Moves all extremities equally.  Neurologic: AAO x3.   Psychiatric: Appropriate mood and affect. No obvious anxiety or depression.      Medical Decision Making       >60 MINUTES SPENT BY ME on the date of service doing chart review, history, exam, documentation & further activities per the note.      Data     I have personally reviewed the following data over the past 24 hrs:    13.1 (H)  \   15.7   / 311     137 103 11.7 /  115 (H)   4.1 24 0.57 \     ALT: 13 AST: 20 AP: 50 TBILI: 0.4   ALB: 4.5 TOT PROTEIN: 7.4 LIPASE: 32     Procal: N/A CRP: <3.00 Lactic Acid: N/A         Imaging results reviewed over the past 24 hrs:   Recent Results (from the past 24 hours)   CT Abdomen Pelvis w Contrast    Narrative    EXAM: CT ABDOMEN PELVIS W CONTRAST  LOCATION: [facility]  DATE: 3/13/2025    INDICATION: Left upper quadrant abdominal pain. Concern for Crohn's flare for abscess versus GI bleed. Describes vomiting black emesis.  COMPARISON: None.  TECHNIQUE: CT scan of the abdomen and pelvis was performed following injection of IV contrast. Multiplanar reformats were obtained. Dose reduction techniques were used.  CONTRAST: 71 mL Isovue  370    FINDINGS:   LOWER CHEST: Normal.    HEPATOBILIARY: Normal.    PANCREAS: Normal.    SPLEEN: Normal.    ADRENAL GLANDS: Normal.    KIDNEYS/BLADDER: Normal.    BOWEL: Proximal/mid dilated bowel measuring up to 4.9 cm with trace interloop stranding with transition point in the right lower quadrant where there is luminal narrowing, moderate wall thickening, and mild mural hyperenhancement (spanning approximately   6.2 cm). Small posterior mucosal polyp measuring 0.6 cm just proximal to the transition point (3/127). Distal small bowel is decompressed.    LYMPH NODES: Unremarkable.    VASCULATURE: Normal.    PELVIC ORGANS: Left Bartholin's gland cyst measuring 1.4 cm. Uterine fibroid measuring up to 7.7 cm. Trace pelvic free fluid. Probable left ovarian functional cyst.    MUSCULOSKELETAL: Unremarkable.      Impression    IMPRESSION:   1.  Proximal/mid small bowel obstruction with transition point in the right lower quadrant due to inflammatory/Crohn's stricture.   2.  Subcentimeter small bowel posterior mucosal polyp just proximal to the transition point may be inflammatory, although consider attention on follow-up to exclude underlying polyp/mass.  3.  Additional findings as in the body of the report.   XR Abdomen Port 1 View    Narrative    EXAM: XR ABDOMEN PORT 1 VIEW  LOCATION: [facility]  DATE: 3/13/2025    INDICATION: portable. post NG placement  COMPARISON: CT abdomen pelvis 3/13/2025.      Impression    IMPRESSION: Enteric tube and side-port project over the left upper quadrant/stomach. Persistent dilated proximal small bowel loop measuring up to 4.7 cm. Contrast noted within the urinary bladder and collecting systems.

## 2025-03-13 NOTE — ED NOTES
[facility]  ED Nurse Handoff Report    ED Chief complaint: Abdominal Pain      ED Diagnosis:   Final diagnoses:   SBO (small bowel obstruction) (H)   Hx of Crohn's disease       Code Status: Full Code    Allergies:   Allergies   Allergen Reactions    Cats Cough    Other Environmental Allergy Itching and Visual Disturbance       Patient Story: Hx of Crohn's, on Stelara. Pt with abdominal pain predominantly to LUQ with nausea and vomiting.  Focused Assessment:  Pt with abdominal pain, NV, elevated WBC, SBO on CT scan. No previous abdominal surgeries.    Treatments and/or interventions provided: Pain and nausea treated effectively in ED with zofran and dilaudid. NG tube placed and adjusted to 55cm at the nare after initial xray. No significant output from NG.  Patient's response to treatments and/or interventions: Pain improved. Tolerated NG placement reasonably well.     To be done/followed up on inpatient unit:      Does this patient have any cognitive concerns?:       Activity level - Baseline/Home:  Independent  Activity Level - Current:   Independent    Patient's Preferred language: English   Needed?: No    Isolation: None  Infection: Not Applicable  Patient tested for COVID 19 prior to admission: NO  Bariatric?: No    Vital Signs:   Vitals:    03/13/25 0820 03/13/25 1330   BP: (!) 137/94 (!) 140/92   Pulse: 105 109   Resp: 16    Temp: 98.3  F (36.8  C)    TempSrc: Oral    SpO2: 98% 95%       Cardiac Rhythm:     Was the PSS-3 completed:   Yes  What interventions are required if any?               Family Comments:  at bedside  OBS brochure/video discussed/provided to patient/family: No              Name of person given brochure if not patient:               Relationship to patient:     For the majority of the shift this patient's behavior was Green.   Behavioral interventions performed were .    ED NURSE PHONE NUMBER: [phone]

## 2025-03-13 NOTE — ED TRIAGE NOTES
Pt reports Hx chrons, has had severe cramping since yesterday morning getting progressively worse. Cramping throughout abdomen with intermitent sharp pains in ULQ. Pt also been vomiting since last night, notes dark in color. Denies diarrhea. Also having fatigue and shakiness.

## 2025-03-13 NOTE — CONSULTS
GASTROENTEROLOGY Consultation      Date of Admission:  3/13/2025  Reason for Admission: SBO  Date of Consult  3/13/2025   Requesting Physician:  Melchor Rosas MD           ASSESSMENT AND RECOMMENDATIONS:   Assessment:  34 year old female with a history of Crohn's disease, prior SBO in 2022 which resolved with conservative management, HTN. P GI consulted for SBO.    #. SBO with transition point  #. Crohn's disease of small bowel  Known CD of small bowel, ileum and likely jejunum. Has been on Ustekinumab (Stelara) since 5/2021 ([id number]), increased to [id number] in Spring 2022. History of SBO in 2022 which resolved with conservative management. Last colonoscopy in 2022 (normal) with normal ileal biopsies. Clinically has been in remission. Now admitted with acute onset abdominal pain/vomiting and evidence of 6.2cm proximal/mid small bowel obstruction with transition point in the RLQ. Colorectal surgery involved, NGT in place with little output.    Patient currently has active bowel sounds and last BM was early this morning (described as soft, small, solid). Agree with keeping NGT to LIS today with close monitoring. Her stomach is not terribly distended on CT imaging so may not drain very much. Colorectal surgery following. Consider starting oral vs IV steroids tomorrow pending clinical course.       Recommendations:  -- IV hydration - give 1L LR now  -- NGT to LIS for now (consider pulling back a few more centimeters and re-imaging)  -- CBC/BMP/CRP daily  -- Appreciate surgery consultation and following  -- VTE prophylaxis for hospitalized IBD patient  -- Continue NPO status for now   -- Analgesia/Antiemetics per primary team    Discussed with primary team Teodora Garcia PA-C    Gastroenterology follow up recommendations:   ?MR enterography as outpatient (to characterize stricture)    Thank you for involving us in this patient's care. Please do not hesitate to contact the GI service with any questions or concerns.  GI will follow    Pt seen and care plan discussed with Dr. Weber and Dr. Ponce, GI staff physicians.      Overall time spent on the date of this encounter preparing to see the patient (including chart review of available notes, clinical status events, imaging and labs); obtaining history; examining the patient, coordinating and/or ordering medications, tests and/or procedures; communicating with other health care professionals; and documenting the above clinical information in the electronic medical record was  95  minutes.      Iliana Salmon PA-C, [facility]   Division of Gastroenterology and Hepatology  Securely message via [email or web address] (Monday- Friday, 8a-4p)     To page the On-[facility] GI provider 24 hours a day, please click AMCOM and select GASTROENTEROLOGY MEDICINE ADULT / [facility] in the drop-down menu.     -------------------------------------------------------------------------------------------------------------------       Reason for Consultation:   SBO           History of Present Illness:     Marybel Redd is a 34 year old female with a PMH significant for Crohn's disease, prior SBO in 2022 which resolved with conservative management, HTN. [facility] GI consulted for SBO      In the ED, vitals included T 98.3F, , /94, RR 16, O2 sat 98 on RA. Lab eval remarkable for WBC 13.1, Hgb 15.7, Plt 311, Tbili 0.4, and CRP <3. Imaging obtained included CT scan abd/pelv with IV contrast (described below). The patient was given IV dilaudid and zofran with improvement in symptoms and admitted to the internal medicine service with colorectal surgery consult. NGT in place and position confirmed via XR however no output. ED RN pulled back 4 cm and still no output    Patient seen and examined at 1400 while in the ED.  at bedside. History is obtained from the patient. She reports acute onset of central abdominal pain/right side abdominal pain last night. Shortly  afterwards started vomiting numerous times, ~1hour. Dark colored emesis but no blood. Felt completely fine prior to yesterdays onset of symptoms. Denies diarrhea or hematochezia. Has been asymptomatic and in remission for years. Reports a lot of stress at work lately. Drinks alcohol socially (about 1-2 drinks a 3-4 days per week). Does not smoke or take NSAIDs.      Previous Procedures:  11/30/2022: Colonoscopy - Dr. Shiv Jones  Crohn's disease in remission on Stelara.                          - Simple Endoscopic Score for Crohn's disease: 0,                          mucosal inflammatory changes secondary to Crohn's                          disease, in remission. Biopsied: ileum   Final Diagnosis   A. ILEUM BIOPSY:  - Small intestinal mucosa with no significant histologic abnormality  - Negative for active inflammation, granuloma formation, pseudopyloric metaplasia and dysplasia               Past Medical History:   Reviewed and edited as appropriate  Past Medical History:   Diagnosis Date    Abnormal Pap smear of cervix 2016    Autoimmune disease 2012    Crohns    Crohn's disease (H) 04/08/2021    Hypertension 12/2020    Unsure it previously caused by steroid meds    SBO (small bowel obstruction) (H) 2020    Uncomplicated asthma     Used inhaler in childhood, no issues as adult            Past Surgical History:   Reviewed and edited as appropriate   Past Surgical History:   Procedure Laterality Date    COLONOSCOPY      COLONOSCOPY N/A 11/30/2022    Procedure: COLONOSCOPY, WITH BIOPSY;  Surgeon: Shiv Jones MD;  Location: [facility] OR    EGD      ND ABLATE HEART DYSRHYTHM FOCUS  1993                Family History:   Patient's family history is reviewed today and is non-contributory    Family History   Problem Relation Age of Onset    Other - See Comments Mother         Hysterectomy    Hypertension Father     Cerebrovascular Disease Maternal Grandmother 50    Colon Cancer Maternal Grandfather         60s     Melanoma Maternal Grandfather     Diabetes Type 1 Paternal Grandmother     Other - See Comments Paternal Grandmother         uterine prolapse    Heart Disease Paternal Grandfather 50        Heart attack    Hypertension Cousin              Allergies:   Reviewed and edited as appropriate     Allergies   Allergen Reactions    Cats Cough    Other Environmental Allergy Itching and Visual Disturbance            Medications:     Current Facility-Administered Medications   Medication Dose Route Frequency Provider Last Rate Last Admin    hydrALAZINE (APRESOLINE) tablet 10 mg  10 mg Oral Q4H PRN Teodora Garcia PA-C        Or    hydrALAZINE (APRESOLINE) injection 10 mg  10 mg Intravenous Q4H PRN Teodora Garcia PA-C        lactated ringers BOLUS 1,000 mL  1,000 mL Intravenous Once Iliana Salmon PA-C        lactated ringers infusion   Intravenous Continuous Teodora Garcia PA-C 100 mL/hr at 03/13/25 1542 New Bag at 03/13/25 1542    lidocaine (LMX4) cream   Topical Q1H PRN Teodora Garcia PA-C        lidocaine 1 % 0.1-1 mL  0.1-1 mL Other Q1H PRN Teodora Garcia PA-C        morphine (PF) injection 1 mg  1 mg Intravenous Q2H PRN Teodora Garcia PA-C        morphine (PF) injection 2 mg  2 mg Intravenous Q2H PRN Teodora Garcia PA-C        ondansetron (ZOFRAN ODT) ODT tab 4 mg  4 mg Oral Q6H PRN Teodora Garcia PA-C        Or    ondansetron (ZOFRAN) injection 4 mg  4 mg Intravenous Q6H PRN Teodora Garcia PA-C        prochlorperazine (COMPAZINE) injection 10 mg  10 mg Intravenous Q6H PRN Teodora Garcia PA-C        Or    prochlorperazine (COMPAZINE) tablet 10 mg  10 mg Oral Q6H PRN Teodora Garcia PA-C        sodium chloride (PF) 0.9% PF flush 3 mL  3 mL Intracatheter Q8H Teodora Garcia PA-C        sodium chloride (PF) 0.9% PF flush 3 mL  3 mL Intracatheter q1 min prn Teodora Garcia PA-C                  Physical Exam:   Temp: 98.3  F (36.8  C) Temp src: Oral BP: (!) 137/94 Pulse: 105   Resp: 16 SpO2: 98 %      Wt:   Wt Readings from Last 2 Encounters:   11/26/24 64.7 kg (142 lb 11.2 oz)   02/27/24 61.6 kg (135 lb 14.4 oz)        General: WDWN, very pleasant female in NAD.  Answers appropriately.    HEENT: Head is AT/NC. Sclera anicteric. No conjunctival injection.  NGT in place with minimal mucous in tubing, nothing in cannister (currently to LIS), dry mucous membranes  Lungs: Clear to auscultation bilaterally.  No wheezes, rhonchi or crackles.    Heart: Regular rate and rhythm.  No murmurs, gallops or rubs.  Normal S1 and S2.  Abdomen: Soft, tender in epigastric region, non-distended.  BS +.  No rebound or peritoneal signs  Extremities: WWP, no pedal edema.  Skin: No jaundice or rash  Neurologic: Grossly non-focal.  CN 2-12 grossly intact.            Data:   Labs and imaging below were independently reviewed and interpreted    LAB WORK:    BMP  Recent Labs   Lab 03/13/25  0857      POTASSIUM 4.1   CHLORIDE 103   DALE 9.8   CO2 24   BUN 11.7   CR 0.57   *     CBC  Recent Labs   Lab 03/13/25  0857   WBC 13.1*   RBC 5.16   HGB 15.7   HCT 45.0   MCV 87   MCH 30.4   MCHC 34.9   RDW 12.3        INRNo lab results found in last 7 days.  LFTs  Recent Labs   Lab 03/13/25  0857   ALKPHOS 50   AST 20   ALT 13   BILITOTAL 0.4   PROTTOTAL 7.4   ALBUMIN 4.5      PANC  Recent Labs   Lab 03/13/25  0857   LIPASE 32       IMAGING:  EXAM: XR ABDOMEN PORT 1 VIEW  LOCATION: [facility]  DATE: 3/13/2025     INDICATION: portable. post NG placement  COMPARISON: CT abdomen pelvis 3/13/2025.                                                                      IMPRESSION: Enteric tube and side-port project over the left upper quadrant/stomach. Persistent dilated proximal small bowel loop measuring up to 4.7 cm. Contrast noted within the urinary bladder and collecting  systems.  -------------------------------------------------------------------------------------  EXAM: CT ABDOMEN PELVIS W CONTRAST  LOCATION: [facility]  DATE: 3/13/2025     INDICATION: Left upper quadrant abdominal pain. Concern for Crohn's flare for abscess versus GI bleed. Describes vomiting black emesis.  COMPARISON: None.  TECHNIQUE: CT scan of the abdomen and pelvis was performed following injection of IV contrast. Multiplanar reformats were obtained. Dose reduction techniques were used.  CONTRAST: 71 mL Isovue 370     FINDINGS:   LOWER CHEST: Normal.     HEPATOBILIARY: Normal.     PANCREAS: Normal.     SPLEEN: Normal.     ADRENAL GLANDS: Normal.     KIDNEYS/BLADDER: Normal.     BOWEL: Proximal/mid dilated bowel measuring up to 4.9 cm with trace interloop stranding with transition point in the right lower quadrant where there is luminal narrowing, moderate wall thickening, and mild mural hyperenhancement (spanning approximately   6.2 cm). Small posterior mucosal polyp measuring 0.6 cm just proximal to the transition point (3/127). Distal small bowel is decompressed.     LYMPH NODES: Unremarkable.     VASCULATURE: Normal.     PELVIC ORGANS: Left Bartholin's gland cyst measuring 1.4 cm. Uterine fibroid measuring up to 7.7 cm. Trace pelvic free fluid. Probable left ovarian functional cyst.     MUSCULOSKELETAL: Unremarkable.                                                                      IMPRESSION:   1.  Proximal/mid small bowel obstruction with transition point in the right lower quadrant due to inflammatory/Crohn's stricture.   2.  Subcentimeter small bowel posterior mucosal polyp just proximal to the transition point may be inflammatory, although consider attention on follow-up to exclude underlying polyp/mass.  3.  Additional findings as in the body of the repo        =======================================================================

## 2025-03-14 ENCOUNTER — MYC MEDICAL ADVICE (OUTPATIENT)
Dept: GASTROENTEROLOGY | Facility: CLINIC | Age: 35
End: 2025-03-14
Payer: COMMERCIAL

## 2025-03-14 LAB
ANION GAP SERPL CALCULATED.3IONS-SCNC: 11 MMOL/L (ref 7–15)
BUN SERPL-MCNC: 4.9 MG/DL (ref 6–20)
CALCIUM SERPL-MCNC: 8.5 MG/DL (ref 8.8–10.4)
CHLORIDE SERPL-SCNC: 105 MMOL/L (ref 98–107)
CREAT SERPL-MCNC: 0.48 MG/DL (ref 0.51–0.95)
CRP SERPL-MCNC: 3.09 MG/L
EGFRCR SERPLBLD CKD-EPI 2021: >90 ML/MIN/1.73M2
ERYTHROCYTE [DISTWIDTH] IN BLOOD BY AUTOMATED COUNT: 12.4 % (ref 10–15)
GLUCOSE SERPL-MCNC: 79 MG/DL (ref 70–99)
HCO3 SERPL-SCNC: 19 MMOL/L (ref 22–29)
HCT VFR BLD AUTO: 37.6 % (ref 35–47)
HGB BLD-MCNC: 12.7 G/DL (ref 11.7–15.7)
HGB BLD-MCNC: 13.4 G/DL (ref 11.7–15.7)
HGB BLD-MCNC: 13.5 G/DL (ref 11.7–15.7)
MAGNESIUM SERPL-MCNC: 1.8 MG/DL (ref 1.7–2.3)
MCH RBC QN AUTO: 30 PG (ref 26.5–33)
MCHC RBC AUTO-ENTMCNC: 33.8 G/DL (ref 31.5–36.5)
MCV RBC AUTO: 89 FL (ref 78–100)
PHOSPHATE SERPL-MCNC: 2.6 MG/DL (ref 2.5–4.5)
PLATELET # BLD AUTO: 245 10E3/UL (ref 150–450)
POTASSIUM SERPL-SCNC: 3.7 MMOL/L (ref 3.4–5.3)
RBC # BLD AUTO: 4.24 10E6/UL (ref 3.8–5.2)
SODIUM SERPL-SCNC: 135 MMOL/L (ref 135–145)
WBC # BLD AUTO: 9 10E3/UL (ref 4–11)

## 2025-03-14 PROCEDURE — 250N000011 HC RX IP 250 OP 636

## 2025-03-14 PROCEDURE — 84100 ASSAY OF PHOSPHORUS: CPT | Performed by: PHYSICIAN ASSISTANT

## 2025-03-14 PROCEDURE — 84520 ASSAY OF UREA NITROGEN: CPT | Performed by: PHYSICIAN ASSISTANT

## 2025-03-14 PROCEDURE — 250N000011 HC RX IP 250 OP 636: Performed by: PHYSICIAN ASSISTANT

## 2025-03-14 PROCEDURE — 86140 C-REACTIVE PROTEIN: CPT | Performed by: PHYSICIAN ASSISTANT

## 2025-03-14 PROCEDURE — 85027 COMPLETE CBC AUTOMATED: CPT | Performed by: PHYSICIAN ASSISTANT

## 2025-03-14 PROCEDURE — 99418 PROLNG IP/OBS E/M EA 15 MIN: CPT | Performed by: PHYSICIAN ASSISTANT

## 2025-03-14 PROCEDURE — 80048 BASIC METABOLIC PNL TOTAL CA: CPT | Performed by: PHYSICIAN ASSISTANT

## 2025-03-14 PROCEDURE — 120N000001 HC R&B MED SURG/OB

## 2025-03-14 PROCEDURE — 258N000003 HC RX IP 258 OP 636: Performed by: PHYSICIAN ASSISTANT

## 2025-03-14 PROCEDURE — 85018 HEMOGLOBIN: CPT | Performed by: PHYSICIAN ASSISTANT

## 2025-03-14 PROCEDURE — 99223 1ST HOSP IP/OBS HIGH 75: CPT | Performed by: PHYSICIAN ASSISTANT

## 2025-03-14 PROCEDURE — 83735 ASSAY OF MAGNESIUM: CPT | Performed by: INTERNAL MEDICINE

## 2025-03-14 PROCEDURE — 99232 SBSQ HOSP IP/OBS MODERATE 35: CPT | Performed by: STUDENT IN AN ORGANIZED HEALTH CARE EDUCATION/TRAINING PROGRAM

## 2025-03-14 PROCEDURE — 36415 COLL VENOUS BLD VENIPUNCTURE: CPT | Performed by: PHYSICIAN ASSISTANT

## 2025-03-14 RX ORDER — ENOXAPARIN SODIUM 100 MG/ML
40 INJECTION SUBCUTANEOUS EVERY 24 HOURS
Status: DISCONTINUED | OUTPATIENT
Start: 2025-03-14 | End: 2025-03-15 | Stop reason: HOSPADM

## 2025-03-14 RX ADMIN — SODIUM CHLORIDE, POTASSIUM CHLORIDE, SODIUM LACTATE AND CALCIUM CHLORIDE: 600; 310; 30; 20 INJECTION, SOLUTION INTRAVENOUS at 15:14

## 2025-03-14 RX ADMIN — ONDANSETRON 4 MG: 2 INJECTION, SOLUTION INTRAMUSCULAR; INTRAVENOUS at 09:36

## 2025-03-14 RX ADMIN — SODIUM CHLORIDE, POTASSIUM CHLORIDE, SODIUM LACTATE AND CALCIUM CHLORIDE: 600; 310; 30; 20 INJECTION, SOLUTION INTRAVENOUS at 03:50

## 2025-03-14 RX ADMIN — ENOXAPARIN SODIUM 40 MG: 40 INJECTION SUBCUTANEOUS at 13:50

## 2025-03-14 ASSESSMENT — ACTIVITIES OF DAILY LIVING (ADL)
ADLS_ACUITY_SCORE: 29
ADLS_ACUITY_SCORE: 23
ADLS_ACUITY_SCORE: 29
ADLS_ACUITY_SCORE: 23
ADLS_ACUITY_SCORE: 29
ADLS_ACUITY_SCORE: 23
ADLS_ACUITY_SCORE: 23
ADLS_ACUITY_SCORE: 29
ADLS_ACUITY_SCORE: 23
ADLS_ACUITY_SCORE: 29
ADLS_ACUITY_SCORE: 23
ADLS_ACUITY_SCORE: 23
ADLS_ACUITY_SCORE: 29
ADLS_ACUITY_SCORE: 23
ADLS_ACUITY_SCORE: 29
ADLS_ACUITY_SCORE: 29
ADLS_ACUITY_SCORE: 18
ADLS_ACUITY_SCORE: 29
ADLS_ACUITY_SCORE: 23
ADLS_ACUITY_SCORE: 29
ADLS_ACUITY_SCORE: 23

## 2025-03-14 NOTE — PLAN OF CARE
Date/Time: 3/14/25 2654-0553    Trauma/Ortho/Medical (Choose one) Medical    Diagnosis: SBO  POD#: NA  Mental Status: A&Ox4  Activity/dangle: Ind  Diet: Clear Liquids  Pain: Denies  Dexter/Voiding: Voiding  Tele/Restraints/Iso: NA  02/LDA: PIV  D/C Date: Pending, likely tomorrow   Other Info: NG tube pulled this evening, positive bowel sounds, passing flatus

## 2025-03-14 NOTE — PROGRESS NOTES
COLON & RECTAL SURGERY  PROGRESS NOTE    March 14, 2025      SUBJECTIVE:  Feeling a lot better this am. Nausea resolved. Reports cramping abdominal discomfort, but denies sharp pain. Passing gas, no BM. Urinating without difficulties. NGT 700ccc charted yesterday. VSS. WBC 9 (from 13.1). CRP 3.09.    OBJECTIVE:  Temp:  [98  F (36.7  C)-98.9  F (37.2  C)] 98.9  F (37.2  C)  Pulse:  [] 98  Resp:  [16-18] 16  BP: (137-149)/(88-99) 145/95  SpO2:  [92 %-98 %] 92 %    Intake/Output Summary (Last 24 hours) at 3/14/2025 0818  Last data filed at 3/14/2025 0630  Gross per 24 hour   Intake --   Output 700 ml   Net -700 ml       GENERAL:  Awake, alert, no acute distress  HEAD: Normocephalic atraumatic  SCLERA: Anicteric  EXTREMITIES: Warm and well perfused  ABDOMEN:  Soft, non- tender, non-distended. No guarding, rigidity, or peritoneal signs. NGT with dark brown output, clearing up.      LABS:  Lab Results   Component Value Date    WBC 9.0 03/14/2025    WBC 8.0 04/15/2021     Lab Results   Component Value Date    HGB 12.7 03/14/2025    HGB 12.9 04/15/2021     Lab Results   Component Value Date    HCT 37.6 03/14/2025    HCT 39.4 04/15/2021     Lab Results   Component Value Date     03/14/2025     04/15/2021     Last Basic Metabolic Panel:  Lab Results   Component Value Date     03/14/2025      Lab Results   Component Value Date    POTASSIUM 3.7 03/14/2025    POTASSIUM 3.5 10/22/2022     Lab Results   Component Value Date    CHLORIDE 105 03/14/2025    CHLORIDE 109 10/22/2022     Lab Results   Component Value Date    DALE 8.5 03/14/2025     Lab Results   Component Value Date    CO2 19 03/14/2025    CO2 25 10/22/2022     Lab Results   Component Value Date    BUN 4.9 03/14/2025    BUN 9 10/22/2022     Lab Results   Component Value Date    CR 0.48 03/14/2025     Lab Results   Component Value Date    GLC 79 03/14/2025    GLC 92 10/22/2022       ASSESSMENT/PLAN: 34 year old female with a history of Crohn's  disease who presented to the ED on 3/13 with abdominal werner and vomiting. CT demonstrated SBO in RLQ due to inflammatory stricture. NGT placed yesterday. Feeling much better today; denies nausea or abdominal pain. GI following as well; discussion of possibly initiating steroids today.     - Clamp trial NGT. Clamp NG for 4 hrs, then return to suction for 30 min and if output is <200mL and no nausea, can remove NGT and give patient clear liquid diet.  - PRN pain meds  - Cont IVF  - Appreciate gastroenterology recommendations   - OOB, ambulate  - Lovenox for DVT ppx.  -No surgical intervention planned at this point. If patient requires NGT again or pain worsens, may need to consider operation this admission.    Discussed with Dr. Stubbs    For questions/paging, please contact the CRS office at [phone].    Shannon Troncoso PA-C  Colorectal Physician Assistant    [facility]  [address] [address]  T: [phone]  F: [phone]

## 2025-03-14 NOTE — PROGRESS NOTES
GASTROENTEROLOGY PROGRESS NOTE    Date of Admission: 3/13/2025  Reason for Admission: SBO      ASSESSMENT:  34 year old female with a history of Crohn's disease, prior SBO in 2022 which resolved with conservative management, HTN. [facility] GI consulted for SBO.     #. SBO r/t stricturing IBD (~6cm small bowel stricture)  #. Crohn's disease of small bowel  Known CD of small bowel, ileum and likely jejunum. Has been on Ustekinumab (Stelara) since 5/2021 ([id number]), increased to [id number] in Spring 2022. History of SBO in 2022 which resolved with conservative management. Last colonoscopy in 2022 (normal) with normal ileal biopsies. Clinically has been in remission. Now admitted with acute onset abdominal pain/vomiting and evidence of 6.2cm proximal/mid small bowel obstruction with transition point in the RLQ. Colorectal surgery involved, NGT in place with improvement in symptoms.    Vitals, labs stable, WBC normalized. Patient feeling greatly improved and continues to pass gas. Discussed with CRS and agree with clamping trial of NGT and close monitoring while advancing diet. If continued improvement with conservative management would avoid starting steroids.         Recommendations:  -- NGT clamping trial and diet advancement per colorectal surgery  -- CBC/BMP/CRP daily  -- No steroids recommended at this time  -- Appreciate surgery consultation and following  -- VTE prophylaxis - Lovenox (CRS team will order)  -- Analgesia/Antiemetics per primary team    Discussed with CRS JESSICA Shannon Troncoso PA-C    The patient was discussed and plan agreed upon with GI staff, Dr Earline Ponce.    GI Follow up (we will arrange):  -Plan for clinic visit with [facility] GI IBD team  -Staff responsible for outpatient care: Dr. Earline Ponce    Overall time spent on the date of this encounter preparing to see the patient (including chart review of available notes, clinical status events, imaging and labs); obtaining history; examining the patient,  coordinating and/or ordering medications, tests and/or procedures; communicating with other health care professionals; and documenting the above clinical information in the electronic medical record was  65  minutes.      Iliana Salmon PA-C, [facility]   Division of Gastroenterology and Hepatology  Securely message via HESKA (Monday- Friday, 8a-4p)     To page the On-Call [facility] GI provider 24 hours a day, please click AMCOM and select GASTROENTEROLOGY MEDICINE ADULT / [facility] in the drop-down menu.    ______________________________________________________      Interval events since last evaluated: Nursing notes and 24hr events reviewed. NAEON, vitals stable. Received morphine overnight for HA. 700ml output from NGT recorded this morning (since admission). No chemical VTE prophy administered last night.     Patient seen and examined at 0930. Patient reports that she is feeling much improved today. NGT started putting out a lot about 10pm last night. Passing flatus, no BM. Denies fevers, chills, sweats. No chest pain/SOB or other new symptoms. Nervous about steroids  - has development of cataract in eye that she attributes to prior steroids.     ROS:   4 pt ROS negative unless noted in subjective.     Objective:  Blood pressure (!) 145/95, pulse 98, temperature 98.9  F (37.2  C), temperature source Oral, resp. rate 16, weight 61.2 kg (135 lb), last menstrual period 03/08/2025, SpO2 92%, not currently breastfeeding.  Gen: Sitting comfortably in bed. Smiling, conversant  HEENT: NCAT. Conjunctiva clear. Sclera anicteric, NGT to LIS with dark green output  CV: Tachycardic but regular, Peripheral perfusion intact  Resp: normal work of breathing, clear anterior  Abd: Soft, NT, ND, no guarding or rebound, BS present but quiet  Msk: no gross deformity  Skin:  no jaundice  Ext: warm, well perfused  Neuro: grossly normal  Mental status/Psych: A&O. Asks/answers questions appropriately        NG Tube Output (ml): 0/700 (Yesterday/Since midnight)    LABS:  BMP  Recent Labs   Lab 03/14/25  0610 03/13/25  0857    137   POTASSIUM 3.7 4.1   CHLORIDE 105 103   DALE 8.5* 9.8   CO2 19* 24   BUN 4.9* 11.7   CR 0.48* 0.57   GLC 79 115*     CBC  Recent Labs   Lab 03/14/25  0610 03/13/25  1614 03/13/25  0857   WBC 9.0  --  13.1*   RBC 4.24  --  5.16   HGB 12.7   < > 15.7   HCT 37.6  --  45.0   MCV 89  --  87   MCH 30.0  --  30.4   MCHC 33.8  --  34.9   RDW 12.4  --  12.3     --  311    < > = values in this interval not displayed.     INRNo lab results found in last 7 days.  LFTs  Recent Labs   Lab 03/13/25  0857   ALKPHOS 50   AST 20   ALT 13   BILITOTAL 0.4   PROTTOTAL 7.4   ALBUMIN 4.5      PANC  Recent Labs   Lab 03/13/25  0857   LIPASE 32         IMAGING:  (Personally reviewed)  EXAM: XR ABDOMEN PORT 1 VIEW  LOCATION: [facility]  DATE: 3/13/2025     INDICATION: portable. post NG placement  COMPARISON: CT abdomen pelvis 3/13/2025.                                                                      IMPRESSION: Enteric tube and side-port project over the left upper quadrant/stomach. Persistent dilated proximal small bowel loop measuring up to 4.7 cm. Contrast noted within the urinary bladder and collecting systems.  -------------------------------------------------------------------------------------  EXAM: CT ABDOMEN PELVIS W CONTRAST  LOCATION: [facility]  DATE: 3/13/2025     INDICATION: Left upper quadrant abdominal pain. Concern for Crohn's flare for abscess versus GI bleed. Describes vomiting black emesis.  COMPARISON: None.  TECHNIQUE: CT scan of the abdomen and pelvis was performed following injection of IV contrast. Multiplanar reformats were obtained. Dose reduction techniques were used.  CONTRAST: 71 mL Isovue 370     FINDINGS:   LOWER CHEST: Normal.     HEPATOBILIARY: Normal.     PANCREAS: Normal.     SPLEEN: Normal.     ADRENAL  GLANDS: Normal.     KIDNEYS/BLADDER: Normal.     BOWEL: Proximal/mid dilated bowel measuring up to 4.9 cm with trace interloop stranding with transition point in the right lower quadrant where there is luminal narrowing, moderate wall thickening, and mild mural hyperenhancement (spanning approximately   6.2 cm). Small posterior mucosal polyp measuring 0.6 cm just proximal to the transition point (3/127). Distal small bowel is decompressed.     LYMPH NODES: Unremarkable.     VASCULATURE: Normal.     PELVIC ORGANS: Left Bartholin's gland cyst measuring 1.4 cm. Uterine fibroid measuring up to 7.7 cm. Trace pelvic free fluid. Probable left ovarian functional cyst.     MUSCULOSKELETAL: Unremarkable.                                                                      IMPRESSION:   1.  Proximal/mid small bowel obstruction with transition point in the right lower quadrant due to inflammatory/Crohn's stricture.   2.  Subcentimeter small bowel posterior mucosal polyp just proximal to the transition point may be inflammatory, although consider attention on follow-up to exclude underlying polyp/mass.  3.  Additional findings as in the body of the report

## 2025-03-14 NOTE — PROGRESS NOTES
[id number]    Patient is alert and oriented x4. Vitally stable on room air. Up independently. NPO. Denies CP/SOB. Voiding adequately. 1 BM during shift. Patient c/o of abdominal discomfort, heat pack applied. Zofran given due to patient feeling nauseous, effective per pt. IV infusing LR @ 100ml/hr. NG clamped, landmark 55 cm. Unclamp NG around 1523. Pending discharge. Plan of care on going. GI and Colorectal surgery following.

## 2025-03-14 NOTE — PLAN OF CARE
Goal Outcome Evaluation:    Date/Time: 3/13/25    Trauma/Ortho/Medical (Choose one) medical    Diagnosis: SBO  POD#: na  Mental Status: A&Ox4  Activity/dangle: up independently  Diet: NPO  Pain: denies  Dexter/Voiding: voiding  Tele/Restraints/Iso: na  02/LDA: IVF, NG to LIS  D/C Date: tbd  Other Info: awaiting GI consult

## 2025-03-14 NOTE — PROGRESS NOTES
[facility]    Medicine Progress Note - Hospitalist Service    Date of Admission:  3/13/2025    Assessment & Plan   Marybel Redd is a 34 year old female medical history significant for Crohn's disease without recent flares, small bowel obstruction 2020, uncomplicated asthma, high blood pressure without diagnosis of hypertension, and cataract of the right eye who was admitted with small bowel obstruction.     Small bowel obstruction  Dark/Black emesis, rule out GI bleed  Leukocytosis   Crohn's Disease on Stelara, no recent flares  History of small bowel obstruction 2020  Presented with 1 day LUQ abd pain, N/V. Emesis has been dark/black in color, no black or bloody stools.  Was vomiting essentially every hour until she came into the ED and received antiemetics. Crohn's disease has been well-controlled without recent flares, stable on Stelara, follows with [facility] GI. Hx partial SBP in past. No hx abd surgery.   Workup in the ED revealed SBP. Leukocytosis 13.1.  CRP <3.  HCG neg. Lipase WNL. UA noninfectious appearing. CT A/P revealed proximal/mid small bowel obstruction with transition point in RLQ due to inflammatory/Crohn's stricture. Also notes, subcentimeter small bowel posterior mucosal polyp just proximal to the transition point may be inflammatory, although consider attention on follow-up to exclude underlying polyp/mass. Given 1L NS in the ED Along with antiemetics with relieved vomiting. Dr. Nicole of general surgery notified. No obvious infection, leukocytosis may be related to acute process, inflammation, stress demargination, emesis. Dark/black emesis may be related to retching.  Plan:  -NGT clamping trial and diet advancement per colorectal surger   -Antiemetics, Analgesia  -Continue bowel rest, NPO, MIVF  -Trend Hgb with report of dark/black emesis  -General Surgery consulted and signed off - recommend [facility] GI and Colorectal consultations  -Colorectal Surgery Consultation,  appreciate assistance   -Conservative management   -NGT decompression and bowel rest.   -No indication for urgent surgery at this time. However, due to the inflammation in the small bowel causing this stricturing and recurrent obstructions, there is a possible this current may not resolve with conservative measures alone.   -Discussed surgical intervention in the future is warranted but would obviously prefer to do this on a semi-elective basis. If her pain and nausea persist or worsen, she may need operative intervention during this admission. This would entail a small bowel resection and a 5-7 day hospital stay. Regardless of operative timeline, diversion with creation of an ileostomy is unlikely.   -[facility] consulted -> No steroids recommended at this time   -Of note, patient reports cataracts as possibly from steroid use in the past  -Trend basic labs  -Monitor clinically    Chronic stable diagnoses and other pertinent medical history: Appropriate PTA medications resumed  Hx high blood pressure without any prior medications: reports has been elevated in the past, never been on meds, will need outpatient follow up  Uncomplicated Asthma          Diet: NPO for Medical/Clinical Reasons Except for: No Exceptions    DVT Prophylaxis: Pneumatic Compression Devices  Dexter Catheter: Not present  Lines: None     Cardiac Monitoring: None  Code Status: Full Code      Clinically Significant Risk Factors           # Hypocalcemia: Lowest Ca = 8.5 mg/dL in last 2 days, will monitor and replace as appropriate                               Social Drivers of Health            Disposition Plan     Medically Ready for Discharge: Anticipated in 2-4 Days             Myles Cornejo MD  Hospitalist Service  [facility]  Securely message with [employer] (more info)  Text page via [employer] Paging/Directory   ______________________________________________________________________    Interval History     Undergoing NGT clamping  trial  No CP/SOB  No fevers  No nausea / vomiting   Very minimal abdominal pain  No new complaints    Physical Exam   Vital Signs: Temp: 99  F (37.2  C) Temp src: Oral BP: (!) 139/95 Pulse: 100   Resp: 18 SpO2: 95 % O2 Device: None (Room air)    Weight: 135 lbs 0 oz    General: no apparent distress  HEENT: NGT present  Respiratory: Clear to auscultation bilaterally, no rales, wheezing, or rhonchi.  Cardiovascular: Mild tachycardia, regular rhythm, +S1 and S2, no murmur auscultated. No peripheral edema.   Gastrointestinal: Soft, no TTP and non-distended. Bowel sounds absent to lower quadrants, hypoactive above.  Skin: Warm, dry. No obvious rashes or lesions on exposed skin. Dorsalis pedis pulses palpable bilaterally.  Musculoskeletal: No joint swelling, erythema or tenderness. Moves all extremities equally.  Neurologic: AAO x3.  And Lisy  Psychiatric: Appropriate mood and affect. No obvious anxiety or depression.  ----------------------------------------------------------------------------------------    Medical Decision Making       35 MINUTES SPENT BY ME on the date of service doing chart review, history, exam, documentation & further activities per the note.      Data   ------------------------- PAST 24 HR DATA REVIEWED -----------------------------------------------    I have personally reviewed the following data over the past 24 hrs:    9.0  \   13.4   / 245     135 105 4.9 (L) /  79   3.7 19 (L) 0.48 (L) \     Procal: N/A CRP: 3.09 Lactic Acid: N/A         Imaging results reviewed over the past 24 hrs:   No results found for this or any previous visit (from the past 24 hours).  ------------------------- ENCOUNTER LABS ----------------------------------------------------------------  Recent Labs   Lab 03/14/25  1351 03/14/25  0610 03/13/25  2314 03/13/25  1614 03/13/25  0857   WBC  --  9.0  --   --  13.1*   HGB 13.4 12.7 12.9   < > 15.7   MCV  --  89  --   --  87   PLT  --  245  --   --  311   NA  --  135  --    --  137   POTASSIUM  --  3.7  --   --  4.1   CHLORIDE  --  105  --   --  103   CO2  --  19*  --   --  24   BUN  --  4.9*  --   --  11.7   CR  --  0.48*  --   --  0.57   ANIONGAP  --  11  --   --  10   DALE  --  8.5*  --   --  9.8   GLC  --  79  --   --  115*   ALBUMIN  --   --   --   --  4.5   PROTTOTAL  --   --   --   --  7.4   BILITOTAL  --   --   --   --  0.4   ALKPHOS  --   --   --   --  50   ALT  --   --   --   --  13   AST  --   --   --   --  20   LIPASE  --   --   --   --  32    < > = values in this interval not displayed.       Most Recent 3 CBC's:  Recent Labs   Lab Test 03/14/25  1351 03/14/25  0610 03/13/25  2314 03/13/25  1614 03/13/25  0857 06/18/24  1613   WBC  --  9.0  --   --  13.1* 9.1   HGB 13.4 12.7 12.9   < > 15.7 14.4   MCV  --  89  --   --  87 90   PLT  --  245  --   --  311 295    < > = values in this interval not displayed.     Most Recent 3 BMP's:  Recent Labs   Lab Test 03/14/25  0610 03/13/25  0857 06/18/24  1613    137 138   POTASSIUM 3.7 4.1 3.9   CHLORIDE 105 103 105   CO2 19* 24 21*   BUN 4.9* 11.7 11.0   CR 0.48* 0.57 0.57   ANIONGAP 11 10 12   DALE 8.5* 9.8 9.2   GLC 79 115* 78     Most Recent 2 LFT's:  Recent Labs   Lab Test 03/13/25  0857 06/18/24  1613   AST 20 18   ALT 13 12   ALKPHOS 50 39*   BILITOTAL 0.4 0.4     Most Recent 3 INR's:No lab results found.  Most Recent 3 Troponin's:No lab results found.  Most Recent 3 BNP's:No lab results found.  Most Recent D-dimer:No lab results found.  Most Recent Cholesterol Panel:No lab results found.  Most Recent 6 Bacteria Isolates From Any Culture (See EPIC Reports for Culture Details):No lab results found.  Most Recent TSH and T4:  Recent Labs   Lab Test 08/19/24  0929   TSH 1.67     Most Recent Urinalysis:  Recent Labs   Lab Test 03/13/25  1021   COLOR Light Yellow   APPEARANCE Clear   URINEGLC Negative   URINEBILI Negative   URINEKETONE 100*   SG 1.005   UBLD Negative   URINEPH 7.5*   PROTEIN Negative   NITRITE Negative   LEUKEST  Negative   RBCU 1   WBCU 2     Most Recent ESR & CRP:  Recent Labs   Lab Test 03/14/25  0610 03/13/25  0857 06/18/24  1613 10/21/22  1004 10/21/22  0012   SED  --   --  5   < >  --    CRP  --   --   --   --  <2.9   CRPI 3.09   < > <3.00   < >  --     < > = values in this interval not displayed.

## 2025-03-14 NOTE — PLAN OF CARE
Goal Outcome Evaluation:      Plan of Care Reviewed With: patient      Summary:  34 year old female with a history of Crohn's disease, prior SBO in 2022 which resolved with conservative management, HTN. P GI consulted for SBO.  Date/Time: 3/13/25 8445-3886  Diagnosis: SBO  Mental Status: A&Ox4  Activity/dangle: up independently  Diet: NPO  Pain: c/o headache, IV morphine given x1 with relief  Dexter/Voiding: Continent of B&B, voiding in the BR  Tele/Restraints/Iso: na  02/LDA: IVF, NG to LIS  D/C Date: tbd  Other Info: GI and Colorectal Surgery following

## 2025-03-15 VITALS
TEMPERATURE: 98.4 F | HEART RATE: 86 BPM | SYSTOLIC BLOOD PRESSURE: 141 MMHG | RESPIRATION RATE: 18 BRPM | OXYGEN SATURATION: 95 % | WEIGHT: 135 LBS | BODY MASS INDEX: 23.17 KG/M2 | DIASTOLIC BLOOD PRESSURE: 99 MMHG

## 2025-03-15 LAB
HGB BLD-MCNC: 13.1 G/DL (ref 11.7–15.7)
MAGNESIUM SERPL-MCNC: 1.7 MG/DL (ref 1.7–2.3)
PHOSPHATE SERPL-MCNC: 2.6 MG/DL (ref 2.5–4.5)
POTASSIUM SERPL-SCNC: 4.2 MMOL/L (ref 3.4–5.3)

## 2025-03-15 PROCEDURE — 258N000003 HC RX IP 258 OP 636: Performed by: PHYSICIAN ASSISTANT

## 2025-03-15 PROCEDURE — 83735 ASSAY OF MAGNESIUM: CPT | Performed by: STUDENT IN AN ORGANIZED HEALTH CARE EDUCATION/TRAINING PROGRAM

## 2025-03-15 PROCEDURE — 84132 ASSAY OF SERUM POTASSIUM: CPT | Performed by: STUDENT IN AN ORGANIZED HEALTH CARE EDUCATION/TRAINING PROGRAM

## 2025-03-15 PROCEDURE — 36415 COLL VENOUS BLD VENIPUNCTURE: CPT | Performed by: PHYSICIAN ASSISTANT

## 2025-03-15 PROCEDURE — 85018 HEMOGLOBIN: CPT | Performed by: PHYSICIAN ASSISTANT

## 2025-03-15 PROCEDURE — 250N000011 HC RX IP 250 OP 636

## 2025-03-15 PROCEDURE — 99239 HOSP IP/OBS DSCHRG MGMT >30: CPT | Performed by: INTERNAL MEDICINE

## 2025-03-15 PROCEDURE — 84100 ASSAY OF PHOSPHORUS: CPT | Performed by: STUDENT IN AN ORGANIZED HEALTH CARE EDUCATION/TRAINING PROGRAM

## 2025-03-15 RX ORDER — NALOXONE HYDROCHLORIDE 0.4 MG/ML
0.4 INJECTION, SOLUTION INTRAMUSCULAR; INTRAVENOUS; SUBCUTANEOUS
Status: DISCONTINUED | OUTPATIENT
Start: 2025-03-15 | End: 2025-03-15 | Stop reason: HOSPADM

## 2025-03-15 RX ORDER — NALOXONE HYDROCHLORIDE 0.4 MG/ML
0.2 INJECTION, SOLUTION INTRAMUSCULAR; INTRAVENOUS; SUBCUTANEOUS
Status: DISCONTINUED | OUTPATIENT
Start: 2025-03-15 | End: 2025-03-15 | Stop reason: HOSPADM

## 2025-03-15 RX ADMIN — ENOXAPARIN SODIUM 40 MG: 40 INJECTION SUBCUTANEOUS at 12:37

## 2025-03-15 RX ADMIN — SODIUM CHLORIDE, POTASSIUM CHLORIDE, SODIUM LACTATE AND CALCIUM CHLORIDE: 600; 310; 30; 20 INJECTION, SOLUTION INTRAVENOUS at 00:23

## 2025-03-15 RX ADMIN — SODIUM CHLORIDE, POTASSIUM CHLORIDE, SODIUM LACTATE AND CALCIUM CHLORIDE: 600; 310; 30; 20 INJECTION, SOLUTION INTRAVENOUS at 10:36

## 2025-03-15 ASSESSMENT — ACTIVITIES OF DAILY LIVING (ADL)
ADLS_ACUITY_SCORE: 29

## 2025-03-15 NOTE — PROGRESS NOTES
COLON & RECTAL SURGERY  PROGRESS NOTE    March 15, 2025      SUBJECTIVE:  NGT removed yesterday and started on clears, tolerating without nausea. No pain currently. Passing gas, no bowel movement yet.    OBJECTIVE:  Temp:  [98  F (36.7  C)-99.6  F (37.6  C)] 98.4  F (36.9  C)  Pulse:  [] 85  Resp:  [16-18] 18  BP: (138-152)/(67-99) 140/90  SpO2:  [95 %-98 %] 95 %      Intake/Output Summary (Last 24 hours) at 3/15/2025 1039  Last data filed at 3/14/2025 1615  Gross per 24 hour   Intake --   Output 50 ml   Net -50 ml     GENERAL:  Awake, alert, no acute distress  HEAD: Normocephalic atraumatic  SCLERA: Anicteric  EXTREMITIES: Warm and well perfused  ABDOMEN:  Soft, non- tender, non-distended    LABS:  Lab Results   Component Value Date    WBC 9.0 03/14/2025    WBC 8.0 04/15/2021     Lab Results   Component Value Date    HGB 12.7 03/14/2025    HGB 12.9 04/15/2021     Lab Results   Component Value Date    HCT 37.6 03/14/2025    HCT 39.4 04/15/2021     Lab Results   Component Value Date     03/14/2025     04/15/2021     Last Basic Metabolic Panel:  Lab Results   Component Value Date     03/14/2025      Lab Results   Component Value Date    POTASSIUM 3.7 03/14/2025    POTASSIUM 3.5 10/22/2022     Lab Results   Component Value Date    CHLORIDE 105 03/14/2025    CHLORIDE 109 10/22/2022     Lab Results   Component Value Date    DALE 8.5 03/14/2025     Lab Results   Component Value Date    CO2 19 03/14/2025    CO2 25 10/22/2022     Lab Results   Component Value Date    BUN 4.9 03/14/2025    BUN 9 10/22/2022     Lab Results   Component Value Date    CR 0.48 03/14/2025     Lab Results   Component Value Date    GLC 79 03/14/2025    GLC 92 10/22/2022       ASSESSMENT/PLAN: 34 year old female with a history of Crohn's disease who presented to the ED on 3/13 with abdominal werner and vomiting. CT demonstrated SBO in RLQ due to inflammatory stricture. Clinically improving with non-operative management.    -  Advance to full liquid diet  - PRN pain meds  - OK to wean IV fluids today  - Appreciate gastroenterology recommendations   - OOB, ambulate  - Lovenox for DVT ppx  - No surgical intervention planned at this point. If patient requires NGT again or pain worsens, may need to consider operation this admission.    Discussed with Dr. Rock.    For questions/paging, please contact the CRS office at [phone].    Moreno Arambula MD  Fellow, Colon & Rectal Surgery  [facility]  [phone]  03/15/2025  10:40 AM

## 2025-03-15 NOTE — DISCHARGE SUMMARY
Hospitalist Discharge Summary      Date of Admission:  3/13/2025  Date of Discharge:  3/15/2025  Discharging Provider: Saeed Heller MD  Discharge Service: Hospitalist Service    Discharge Diagnoses         Small bowel obstruction  Dark/Black emesis, rule out GI bleed  Leukocytosis   Crohn's Disease on Stelara, no recent flares  History of small bowel obstruction 2020     Hx high blood pressure without any prior medications:      Uncomplicated Asthma           Medically Ready for Discharge: 3/15    Clinically Significant Risk Factors          Follow-ups Needed After Discharge   {Additional follow-up instructions/to-do's for PCP and [facility] GI    Unresulted Labs Ordered in the Past 30 Days of this Admission       No orders found from 2/11/2025 to 3/14/2025.        These results will be followed up by [facility] GI and PCP    Discharge Disposition   Discharged to home  Condition at discharge: Stable    Hospital Course   Marybel Redd is a 34 year old female medical history significant for Crohn's disease without recent flares, small bowel obstruction 2020, uncomplicated asthma, high blood pressure without diagnosis of hypertension, and cataract of the right eye who was admitted with small bowel obstruction.      Small bowel obstruction  Dark/Black emesis, rule out GI bleed  Leukocytosis   Crohn's Disease on Stelara, no recent flares  History of small bowel obstruction 2020  Presented with 1 day LUQ abd pain, N/V. Emesis has been dark/black in color, no black or bloody stools.  Was vomiting essentially every hour until she came into the ED and received antiemetics. Crohn's disease has been well-controlled without recent flares, stable on Stelara, follows with [facility] GI. Hx partial SBP in past. No hx abd surgery.   Workup in the ED revealed SBP. Leukocytosis 13.1.  CRP <3.  HCG neg. Lipase WNL. UA noninfectious appearing. CT A/P revealed proximal/mid small bowel obstruction with  transition point in RLQ due to inflammatory/Crohn's stricture. Also notes, subcentimeter small bowel posterior mucosal polyp just proximal to the transition point may be inflammatory, although consider attention on follow-up to exclude underlying polyp/mass. Given 1L NS in the ED Along with antiemetics with relieved vomiting. Dr. Nicole of general surgery notified. No obvious infection, leukocytosis may be related to acute process, inflammation, stress demargination, emesis. Dark/black emesis may be related to retching.  Plan:  -NGT clamping trial and diet advancement per colorectal surger   -Antiemetics, Analgesia  -Continue bowel rest, NPO, MIVF  -Trend Hgb with report of dark/black emesis  -General Surgery consulted and signed off - recommend [facility] GI and Colorectal consultations  -Colorectal Surgery Consultation, appreciate assistance              -Conservative management   -NGT decompression stopped  -No indication for urgent surgery at this time. However, due to the inflammation in the small bowel causing this stricturing and recurrent obstructions, there is a possible this current may not resolve with conservative measures alone.   -Discussed surgical intervention in the future is warranted but would obviously prefer to do this on a semi-elective basis. If her pain and nausea persist or worsen, she may need operative intervention during this admission. This would entail a small bowel resection and a 5-7 day hospital stay. Regardless of operative timeline, diversion with creation of an ileostomy is unlikely.   -[facility] GI consulted -> No steroids recommended at this time   -Of note, patient reports cataracts as possibly from steroid use in the past  -Trend basic labs  -3/15 having BM and tolerating diet  -if she can tolerate low fiber diet this evening, she can be discharged     Chronic stable diagnoses and other pertinent medical history: Appropriate PTA medications resumed  Hx high blood pressure without any prior  medications: reports has been elevated in the past, never been on meds, will need outpatient follow up  Uncomplicated Asthma           Diet: advance to low fiber diet  DVT Prophylaxis: Pneumatic Compression Devices  Dexter Catheter: Not present  Lines: None     Cardiac Monitoring: None  Code Status: Full Code          Clinically Significant Risk Factors           # Hypocalcemia: Lowest Ca = 8.5 mg/dL in last 2 days, will monitor and replace as appropriate                    Social Drivers of Health        Disposition Plan  -- home     Medically Ready for Discharge: 3/15    Disposition:   -- follow up with [facility] GI as outpatient    Consultations This Hospital Stay   COLORECTAL SURGERY IP CONSULT  GI LUMINAL ADULT IP CONSULT  SURGERY GENERAL IP CONSULT    Code Status   Full Code    Time Spent on this Encounter   I, Saeed Heller MD, personally saw the patient today and spent greater than 30 minutes discharging this patient.       Saeed Heller MD  [facility]  [address] [place] [id number]  Phone: [phone]  Fax: [phone]  ______________________________________________________________________    Physical Exam   Vital Signs: Temp: 98.4  F (36.9  C) Temp src: Oral BP: (!) 140/90 Pulse: 85   Resp: 18 SpO2: 95 % O2 Device: None (Room air)    Weight: 135 lbs 0 oz         Primary Care Physician   Physician No Ref-Primary    Discharge Orders   No discharge procedures on file.    Significant Results and Procedures   Most Recent 3 CBC's:  Recent Labs   Lab Test 03/15/25  0613 03/14/25  2225 03/14/25  1351 03/14/25  0610 03/13/25  1614 03/13/25  0857 06/18/24  1613   WBC  --   --   --  9.0  --  13.1* 9.1   HGB 13.1 13.5 13.4 12.7   < > 15.7 14.4   MCV  --   --   --  89  --  87 90   PLT  --   --   --  245  --  311 295    < > = values in this interval not displayed.     Most Recent 3 BMP's:  Recent Labs   Lab Test 03/15/25  0613 03/14/25  0610 03/13/25  0857 06/18/24  2723    NA  --  135 137 138   POTASSIUM 4.2 3.7 4.1 3.9   CHLORIDE  --  105 103 105   CO2  --  19* 24 21*   BUN  --  4.9* 11.7 11.0   CR  --  0.48* 0.57 0.57   ANIONGAP  --  11 10 12   DALE  --  8.5* 9.8 9.2   GLC  --  79 115* 78     Most Recent 2 LFT's:  Recent Labs   Lab Test 03/13/25  0857 06/18/24  1613   AST 20 18   ALT 13 12   ALKPHOS 50 39*   BILITOTAL 0.4 0.4   ,   Results for orders placed or performed during the hospital encounter of 03/13/25   CT Abdomen Pelvis w Contrast    Narrative    EXAM: CT ABDOMEN PELVIS W CONTRAST  LOCATION: [facility]  DATE: 3/13/2025    INDICATION: Left upper quadrant abdominal pain. Concern for Crohn's flare for abscess versus GI bleed. Describes vomiting black emesis.  COMPARISON: None.  TECHNIQUE: CT scan of the abdomen and pelvis was performed following injection of IV contrast. Multiplanar reformats were obtained. Dose reduction techniques were used.  CONTRAST: 71 mL Isovue 370    FINDINGS:   LOWER CHEST: Normal.    HEPATOBILIARY: Normal.    PANCREAS: Normal.    SPLEEN: Normal.    ADRENAL GLANDS: Normal.    KIDNEYS/BLADDER: Normal.    BOWEL: Proximal/mid dilated bowel measuring up to 4.9 cm with trace interloop stranding with transition point in the right lower quadrant where there is luminal narrowing, moderate wall thickening, and mild mural hyperenhancement (spanning approximately   6.2 cm). Small posterior mucosal polyp measuring 0.6 cm just proximal to the transition point (3/127). Distal small bowel is decompressed.    LYMPH NODES: Unremarkable.    VASCULATURE: Normal.    PELVIC ORGANS: Left Bartholin's gland cyst measuring 1.4 cm. Uterine fibroid measuring up to 7.7 cm. Trace pelvic free fluid. Probable left ovarian functional cyst.    MUSCULOSKELETAL: Unremarkable.      Impression    IMPRESSION:   1.  Proximal/mid small bowel obstruction with transition point in the right lower quadrant due to inflammatory/Crohn's stricture.   2.  Subcentimeter  small bowel posterior mucosal polyp just proximal to the transition point may be inflammatory, although consider attention on follow-up to exclude underlying polyp/mass.  3.  Additional findings as in the body of the report.   XR Abdomen Port 1 View    Narrative    EXAM: XR ABDOMEN PORT 1 VIEW  LOCATION: [facility]  DATE: 3/13/2025    INDICATION: portable. post NG placement  COMPARISON: CT abdomen pelvis 3/13/2025.      Impression    IMPRESSION: Enteric tube and side-port project over the left upper quadrant/stomach. Persistent dilated proximal small bowel loop measuring up to 4.7 cm. Contrast noted within the urinary bladder and collecting systems.       Discharge Medications   Current Discharge Medication List        CONTINUE these medications which have NOT CHANGED    Details   acetaminophen (TYLENOL) 500 MG tablet Take 500-1,000 mg by mouth every 6 hours as needed for mild pain or pain.      lactobacillus rhamnosus, GG, (CULTURELL) capsule Take 1 capsule by mouth daily.      Olopatadine HCl (PATADAY OP) Apply 1 drop to eye as needed (seasonal allergies).      Prenatal Vit-Fe Fumarate-FA (PRENATAL MULTIVITAMIN  PLUS IRON) 27-1 MG TABS Take 1 tablet by mouth daily.      ustekinumab (STELARA) 90 MG/ML INJECT 1 ML (90 MG) UNDER THE SKIN EVERY 6 WEEKS.  Qty: 1 mL, Refills: 3    Associated Diagnoses: Crohn's disease (H)           Allergies   Allergies   Allergen Reactions    Cats Cough    Other Environmental Allergy Itching and Visual Disturbance

## 2025-03-15 NOTE — PLAN OF CARE
Goal Outcome Evaluation:         Date/Time: 3/14/25 6646-2732     Trauma/Ortho/Medical (Choose one) Medical     Diagnosis: SBO  POD#: NA  Mental Status: A&Ox4  Activity/dangle: Ind  Diet: Clear Liquids, tolerating, no n/v  Vitals: VSS on RA ex hypertension  Pain: Denies  Dexter/Voiding: Voiding BR  Tele/Restraints/Iso: NA  02/LDA: PIV LR at 100  D/C Date: Pending, likely tomorrow   Other Info: NG tube pulled this evening, positive bowel sounds, passing flatus. CMS intact. Lungs clear. CTM.

## 2025-03-15 NOTE — PLAN OF CARE
Date/Time: 3/15/25 0700 - 1500  Diagnosis: SBO  Mental Status: A&O x4  Activity/dangle: Independent in room  Diet: Low Fiber  Pain: Denies pain  Dexter/Voiding: Voiding in the bathroom  Tele/Restraints/Iso: N/A  02/LDA: Room air. PIV removed  D/C Date: Discharging today  Other Info: Potassium, Magnesium and Phosphorous protocol, no replacement needed.

## 2025-03-15 NOTE — PROGRESS NOTES
[facility]  Hospitalist Progress Note  Saeed Heller MD  03/15/2025    Assessment & Plan   Marybel Redd is a 34 year old female medical history significant for Crohn's disease without recent flares, small bowel obstruction 2020, uncomplicated asthma, high blood pressure without diagnosis of hypertension, and cataract of the right eye who was admitted with small bowel obstruction.      Small bowel obstruction  Dark/Black emesis, rule out GI bleed  Leukocytosis   Crohn's Disease on Stelara, no recent flares  History of small bowel obstruction 2020  Presented with 1 day LUQ abd pain, N/V. Emesis has been dark/black in color, no black or bloody stools.  Was vomiting essentially every hour until she came into the ED and received antiemetics. Crohn's disease has been well-controlled without recent flares, stable on Stelara, follows with [facility]. Hx partial SBP in past. No hx abd surgery.   Workup in the ED revealed SBP. Leukocytosis 13.1.  CRP <3.  HCG neg. Lipase WNL. UA noninfectious appearing. CT A/P revealed proximal/mid small bowel obstruction with transition point in RLQ due to inflammatory/Crohn's stricture. Also notes, subcentimeter small bowel posterior mucosal polyp just proximal to the transition point may be inflammatory, although consider attention on follow-up to exclude underlying polyp/mass. Given 1L NS in the ED Along with antiemetics with relieved vomiting. Dr. Nicole of general surgery notified. No obvious infection, leukocytosis may be related to acute process, inflammation, stress demargination, emesis. Dark/black emesis may be related to retching.  Plan:  -NGT clamping trial and diet advancement per colorectal surger   -Antiemetics, Analgesia  -Continue bowel rest, NPO, MIVF  -Trend Hgb with report of dark/black emesis  -General Surgery consulted and signed off - recommend [facility] Col[facility] consultations  -Colorectal Surgery Consultation, appreciate  assistance              -Conservative management   -NGT decompression stopped  -No indication for urgent surgery at this time. However, due to the inflammation in the small bowel causing this stricturing and recurrent obstructions, there is a possible this current may not resolve with conservative measures alone.   -Discussed surgical intervention in the future is warranted but would obviously prefer to do this on a semi-elective basis. If her pain and nausea persist or worsen, she may need operative intervention during this admission. This would entail a small bowel resection and a 5-7 day hospital stay. Regardless of operative timeline, diversion with creation of an ileostomy is unlikely.   -[facility] consulted -> No steroids recommended at this time   -Of note, patient reports cataracts as possibly from steroid use in the past  -Trend basic labs  -having BM and tolerating diet  -if she can tolerate low fiber diet this evening, she can be discharged     Chronic stable diagnoses and other pertinent medical history: Appropriate PTA medications resumed  Hx high blood pressure without any prior medications: reports has been elevated in the past, never been on meds, will need outpatient follow up  Uncomplicated Asthma           Diet: advance to low fiber diet  DVT Prophylaxis: Pneumatic Compression Devices  Dexter Catheter: Not present  Lines: None     Cardiac Monitoring: None  Code Status: Full Code          Clinically Significant Risk Factors           # Hypocalcemia: Lowest Ca = 8.5 mg/dL in last 2 days, will monitor and replace as appropriate                    Social Drivers of Health        Disposition Plan  -- home     Medically Ready for Discharge: 3/15    Disposition:   -- follow up with [facility] as outpatient    Interval History   -- chart reviewed  -- not on steroids or pain medications  -- did just have a BM  -- wanting to be discharged as soon as safely possible    -Data reviewed today: I reviewed all new labs  and imaging over the last 24 hours. I personally reviewed no images or EKG's today.    Physical Exam    , Blood pressure (!) 140/90, pulse 85, temperature 98.4  F (36.9  C), temperature source Oral, resp. rate 18, weight 61.2 kg (135 lb), last menstrual period 03/08/2025, SpO2 95%, not currently breastfeeding.  Vitals:    03/13/25 1700   Weight: 61.2 kg (135 lb)     Vital Signs with Ranges  Temp:  [98  F (36.7  C)-99.6  F (37.6  C)] 98.4  F (36.9  C)  Pulse:  [78-95] 85  Resp:  [16-18] 18  BP: (138-152)/(67-99) 140/90  SpO2:  [95 %-98 %] 95 %  I/O's Last 24 hours  I/O last 3 completed shifts:  In: -   Out: 50 [Emesis/NG output:50]    Constitutional: Awake, alert, cooperative, no apparent distress  Respiratory: Clear to auscultation bilaterally, no crackles or wheezing  Cardiovascular: Regular rate and rhythm, normal S1 and S2, and no murmur noted  GI: Normal bowel sounds, soft, non-distended, non-tender  Skin/Integumen: No rashes, no cyanosis, no edema  Other:      Medications   All medications were reviewed.  Current Facility-Administered Medications   Medication Dose Route Frequency Provider Last Rate Last Admin     Current Facility-Administered Medications   Medication Dose Route Frequency Provider Last Rate Last Admin    enoxaparin ANTICOAGULANT (LOVENOX) injection 40 mg  40 mg Subcutaneous Q24H Shannon Troncoso PA-C   40 mg at 03/15/25 1237    sodium chloride (PF) 0.9% PF flush 3 mL  3 mL Intracatheter Q8H Teodora Garcia PA-C            Data   Recent Labs   Lab 03/15/25  0613 03/14/25  2225 03/14/25  1351 03/14/25  0610 03/13/25  1614 03/13/25  0857   WBC  --   --   --  9.0  --  13.1*   HGB 13.1 13.5 13.4 12.7   < > 15.7   MCV  --   --   --  89  --  87   PLT  --   --   --  245  --  311   NA  --   --   --  135  --  137   POTASSIUM 4.2  --   --  3.7  --  4.1   CHLORIDE  --   --   --  105  --  103   CO2  --   --   --  19*  --  24   BUN  --   --   --  4.9*  --  11.7   CR  --   --   --  0.48*  --  0.57    ANIONGAP  --   --   --  11  --  10   DALE  --   --   --  8.5*  --  9.8   GLC  --   --   --  79  --  115*   ALBUMIN  --   --   --   --   --  4.5   PROTTOTAL  --   --   --   --   --  7.4   BILITOTAL  --   --   --   --   --  0.4   ALKPHOS  --   --   --   --   --  50   ALT  --   --   --   --   --  13   AST  --   --   --   --   --  20   LIPASE  --   --   --   --   --  32    < > = values in this interval not displayed.       No results found for this or any previous visit (from the past 24 hours).    Saeed Heller MD  Text Page  (7am to 6pm)

## 2025-03-15 NOTE — PROGRESS NOTES
Date/Time: 3/15/25 0430 - 530  Diagnosis: SBO  POD#:NA  Mental Status: AOX4  Activity/Dangle: Independent  Diet: Clear Liquids  Pain: Denies  Dexter/Voiding: voiding in BR  Tele/Restraints/ISO: NA  O2/LDA: PIV LR at 100  DC Date: TBD  Other Information: CMS intact, VSS on RA, bowel sounds present, and patient reports passing flatus. No BM this shift. K+, Mg, and Phosphorous protocols, labs today.

## 2025-03-17 ENCOUNTER — TELEPHONE (OUTPATIENT)
Dept: GASTROENTEROLOGY | Facility: CLINIC | Age: 35
End: 2025-03-17
Payer: COMMERCIAL

## 2025-03-17 NOTE — TELEPHONE ENCOUNTER
[facility] Call Center    Phone Message    May a detailed message be left on voicemail: yes     Reason for Call: Other: Patient called stating she was in the hospital over the weekend. States she needs to schedule soonest follow up appointment. Currently scheduled for 6/10/25 and added to wait list. Patient open to seeing any provider.      Action Taken: Message routed to:  [facility] GI    Travel Screening: Not Applicable     Date of Service:

## 2025-03-25 ENCOUNTER — OFFICE VISIT (OUTPATIENT)
Dept: GASTROENTEROLOGY | Facility: CLINIC | Age: 35
End: 2025-03-25
Payer: COMMERCIAL

## 2025-03-25 VITALS
HEART RATE: 87 BPM | DIASTOLIC BLOOD PRESSURE: 99 MMHG | HEIGHT: 64 IN | WEIGHT: 135 LBS | SYSTOLIC BLOOD PRESSURE: 146 MMHG | BODY MASS INDEX: 23.05 KG/M2 | OXYGEN SATURATION: 98 %

## 2025-03-25 DIAGNOSIS — K50.018 CROHN'S DISEASE OF ILEUM WITH OTHER COMPLICATION (H): Primary | ICD-10-CM

## 2025-03-25 DIAGNOSIS — D84.9 IMMUNOSUPPRESSION: ICD-10-CM

## 2025-03-25 PROCEDURE — 3080F DIAST BP >= 90 MM HG: CPT | Performed by: INTERNAL MEDICINE

## 2025-03-25 PROCEDURE — 1126F AMNT PAIN NOTED NONE PRSNT: CPT | Performed by: INTERNAL MEDICINE

## 2025-03-25 PROCEDURE — 99215 OFFICE O/P EST HI 40 MIN: CPT | Performed by: INTERNAL MEDICINE

## 2025-03-25 PROCEDURE — 3077F SYST BP >= 140 MM HG: CPT | Performed by: INTERNAL MEDICINE

## 2025-03-25 ASSESSMENT — PAIN SCALES - GENERAL: PAINLEVEL_OUTOF10: NO PAIN (0)

## 2025-03-25 NOTE — PATIENT INSTRUCTIONS
PLAN  --Follow up on MRE scheduled for April   -------if there is a growth, will plan for a balloon enteroscopy with an advanced gastroenterologist (likely anterograde)  --Continue Stelara every 6 weeks

## 2025-03-25 NOTE — PROGRESS NOTES
IBD CLINIC VISIT     CC/REFERRING MD:  Dr. Akin Bernal  REASON FOR FOLLOW UP: Crohn's    ASSESSMENT/PLAN  34 year old female with Crohn's disease of the small bowel (ileum and likely jejunum)    1. Small bowel crohn's disease:    Current medication:    Ustekinumab every 8 weeks (started 5/2021)   Ustekinumab every 6 weeks (Spring 2022)  Current clinical disease activity: remission, HBI 0   Last endoscopic disease activity: 11/30/22: SES-CD: 0  Last radiographic disease assessment: MRE 1/17/22 with moderate segmental length of ileum (10cm approx) with diffuse wall thickening consistent with acute and chronic inflammation.    Marybel was recently hospitalized with an SBO on 3/13, improved with conservative management. CT a/p showed a possible 6 mm polyp at the transition point, which was in the proximal/mid small bowel in the RLQ.     She is now back to her baseline of clinical remission.   Today we spent some time discussing the possible causes of her recent SBO. This remains unclear, but the presence of a polyp/mass is a possibility per the CT scan. She will have an MRE performed in early April to further characterize this. Regardless, it may be best to pursue a balloon enteroscopy for direct visualization of that area. She is interested in conceiving soon so it would be helpful to examine the area prior to conception. For now, she and Syd will hold off from attempted conception.     PLAN  --Follow up on MRE scheduled for April   -------if there is a growth, will plan for a balloon enteroscopy with an advanced gastroenterologist (likely anterograde). Regardless, this may be beneficial.   -------Hold off from attempted conception for now, until we characterize the small bowel better.   --Continue Stelara every 6 weeks     Colon cancer screening:  Given disease is limited to small bowel, colon cancer screening is recommended at age 45.     Misc:  -- Avoid tobacco use  -- Avoid NSAIDs as there is potentially a 25%  chance of causing an IBD flare    RTC 3 months      Earline Ponce MD  Associate Professor of Medicine  Division of Gastroenterology, Hepatology and Nutrition  [facility]    CROHN'S HISTORY:  Age at diagnosis: 2012  Extent of disease: small bowel   Disease phenotype: inflammatory  Chayito-anal disease: none  Prior IBD surgeries: none  Prior IBD Medications:    50 mg 6MP    DRUG MONITORING  TPMT enzyme activity: 40.4 (6/14/2012)    6-TGN/6-MMPN levels: --    Biologic concentration:  Usteinumab 2/2022 = 1.6, moved to every 6 weeks     HPI:   Here for follow-up.   Getting  in 2 weeks!!  Bowel pattern is 1 BM daily. Occasional constipation and bloating.  Nutrition goal: tries to be gluten free.      HBI:  Overall patient well being (prior day): 0 (Very well)  Abdominal pain (prior day): 0 (None)  Number of liquid or soft stools (prior day): 0 (1 point per stool)  Abdominal mass on exam: 0 (None)  Complications (1 point for each):   None    Remission <5  Mild activity 5-7  Moderate activity 8-16  Severe > 16    Extra intestinal manifestations of IBD:  No uveitis/episcleritis  No aphthous ulcers   No arthritis   No erythema nodosum/pyoderma gangrenosum.     Interval history, 11/2023  Had anemia shortly after having a 2 week period in the setting of an IUD. After IUD was removed, periods have normalized. Most recent Hgb in July 14.5.     HBI:  Overall patient well being (prior day): 0 (Very well)  Abdominal pain (prior day): 0 (None)  Number of liquid or soft stools (prior day): 0 (1 point per stool)  Abdominal mass on exam: 0 (None)  Complications (1 point for each): None    Interval history, 2/2024  Feeling well today. No GI complaints. No waning effect on Stelara q6w.     Going to [place] next month.    HBI:  Overall patient well being (prior day): 0 (Very well)  Abdominal pain (prior day): 0 (None)  Number of liquid or soft stools (prior day): 0 (1 point per stool)  Abdominal mass on exam: 0  (None)  Complications (1 point for each):   None    Interval history, 11/2024  Summer some bloating, occasional constipation. Recently diagnosed with cataracts. Still taking every 6 weeks. Actively trying to conceive. Saw a fertility specialist.     HBI:  Overall patient well being (prior day): 0 (Very well)  Abdominal pain (prior day): 0 (None)  Number of liquid or soft stools (prior day): 0 (1 point per stool)  Abdominal mass on exam: 0 (None)  Complications (1 point for each):   None    Extra intestinal manifestations of IBD:  No uveitis/episcleritis  No aphthous ulcers   No arthritis   No erythema nodosum/pyoderma gangrenosum.       Interval history, 3/2025 (in person)    Hospitalized with an SBO on 3/13, improved with conservative management. CT a/p showed a possible 6 mm polyp at the transition point, which was in the proximal/mid small bowel in the RLQ.     MRE scheduled for 4/2. This was in the middle of her injection interval (every 6 weeks).    Feeling completely back to baseline. Having 1 formed BM per day. No weight loss, urgency, or blood in stool. No abdominal pain. Tolerating all foods.         ROS:  Constitutional, HEENT, cardiovascular, pulmonary, GI, , musculoskeletal, neuro, skin, endocrine and psych systems are negative, except as otherwise noted.     PERTINENT PAST MEDICAL HISTORY:  Past Medical History:   Diagnosis Date    Abnormal Pap smear of cervix 2016    Autoimmune disease 2012    Crohns    Crohn's disease (H) 04/08/2021    Hypertension 12/2020    Unsure it previously caused by steroid meds    SBO (small bowel obstruction) (H) 2020    Uncomplicated asthma     Used inhaler in childhood, no issues as adult       PREVIOUS SURGERIES:  Past Surgical History:   Procedure Laterality Date    COLONOSCOPY      COLONOSCOPY N/A 11/30/2022    Procedure: COLONOSCOPY, WITH BIOPSY;  Surgeon: Shiv Jones MD;  Location: [facility] OR    EGD      OH ABLATE HEART DYSRHYTHM FOCUS  1993     ALLERGIES:      Allergies   Allergen Reactions    Cats Cough    Other Environmental Allergy Itching and Visual Disturbance       PERTINENT MEDICATIONS:    Current Outpatient Medications:     acetaminophen (TYLENOL) 500 MG tablet, Take 500-1,000 mg by mouth every 6 hours as needed for mild pain or pain., Disp: , Rfl:     Olopatadine HCl (PATADAY OP), Apply 1 drop to eye as needed (seasonal allergies)., Disp: , Rfl:     Prenatal Vit-Fe Fumarate-FA (PRENATAL MULTIVITAMIN  PLUS IRON) 27-1 MG TABS, Take 1 tablet by mouth daily., Disp: , Rfl:     ustekinumab (STELARA) 90 MG/ML, INJECT 1 ML (90 MG) UNDER THE SKIN EVERY 6 WEEKS., Disp: 1 mL, Rfl: 3    lactobacillus rhamnosus, GG, (CULTURELL) capsule, Take 1 capsule by mouth daily. (Patient not taking: Reported on 3/25/2025), Disp: , Rfl:     SOCIAL HISTORY:  Social History     Socioeconomic History    Marital status:      Spouse name: Not on file    Number of children: Not on file    Years of education: Not on file    Highest education level: Not on file   Occupational History    Not on file   Tobacco Use    Smoking status: Never    Smokeless tobacco: Never   Substance and Sexual Activity    Alcohol use: Yes     Comment: occasional    Drug use: Never    Sexual activity: Yes     Partners: Male     Birth control/protection: Condom, None   Other Topics Concern    Parent/sibling w/ CABG, MI or angioplasty before 65F 55M? No   Social History Narrative    Not on file     Social Drivers of Health     Financial Resource Strain: Low Risk  (3/13/2025)    Financial Resource Strain     Within the past 12 months, have you or your family members you live with been unable to get utilities (heat, electricity) when it was really needed?: No   Food Insecurity: Low Risk  (3/13/2025)    Food Insecurity     Within the past 12 months, did you worry that your food would run out before you got money to buy more?: No     Within the past 12 months, did the food you bought just not last and you didn t have  "money to get more?: No   Transportation Needs: Low Risk  (3/13/2025)    Transportation Needs     Within the past 12 months, has lack of transportation kept you from medical appointments, getting your medicines, non-medical meetings or appointments, work, or from getting things that you need?: No   Physical Activity: Not on file   Stress: Not on file   Social Connections: Not on file   Interpersonal Safety: Low Risk  (3/13/2025)    Interpersonal Safety     Do you feel physically and emotionally safe where you currently live?: Yes     Within the past 12 months, have you been hit, slapped, kicked or otherwise physically hurt by someone?: No     Within the past 12 months, have you been humiliated or emotionally abused in other ways by your partner or ex-partner?: No   Housing Stability: Low Risk  (3/13/2025)    Housing Stability     Do you have housing? : Yes     Are you worried about losing your housing?: No       FAMILY HISTORY:  Cousin has UC  Family History   Problem Relation Age of Onset    Other - See Comments Mother         Hysterectomy    Hypertension Father     Cerebrovascular Disease Maternal Grandmother 50    Colon Cancer Maternal Grandfather         60s    Melanoma Maternal Grandfather     Diabetes Type 1 Paternal Grandmother     Other - See Comments Paternal Grandmother         uterine prolapse    Heart Disease Paternal Grandfather 50        Heart attack    Hypertension Cousin        Past/family/social history reviewed and no changes    PHYSICAL EXAMINATION:  Constitutional: aaox3, cooperative, pleasant, not dyspneic/diaphoretic, no acute distress  Vitals reviewed: BP (!) 146/99   Pulse 87   Ht 1.626 m (5' 4\")   Wt 61.2 kg (135 lb)   LMP 03/08/2025 (Approximate)   SpO2 98%   BMI 23.17 kg/m    Wt:   Wt Readings from Last 2 Encounters:   03/25/25 61.2 kg (135 lb)   03/13/25 61.2 kg (135 lb)      Constitutional - general appearance is well and in no acute distress. Body habitus normal  Eyes - No redness " or discharge  Respiratory - No cough, unlabored breathing  Abdomen - Non tender to palpation, no rebound or guarding  Skin - No discoloration or lesions  Neurological - No tremors  Psychiatric - No anxiety, alert & oriented

## 2025-03-25 NOTE — NURSING NOTE
"Do you have a history of colon cancer in your immediate family? YES    If yes who: maternal grandfather     And what age  were they diagnosed: 60s      Chief Complaint   Patient presents with    Follow Up       Vitals:    03/25/25 1457   BP: (!) 146/99   Pulse: 87   SpO2: 98%   Weight: 61.2 kg (135 lb)   Height: 1.626 m (5' 4\")       Body mass index is 23.17 kg/m .    Genaro Robert MA        "

## 2025-03-25 NOTE — LETTER
3/25/2025      Marybel Redd  [address] [place] [address]      Dear Colleague,    Thank you for referring your patient, Marybel Redd, to the [facility] [place]. Please see a copy of my visit note below.    IBD CLINIC VISIT     CC/REFERRING MD:  Dr. Akin Bernal  REASON FOR FOLLOW UP: Crohn's    ASSESSMENT/PLAN  34 year old female with Crohn's disease of the small bowel (ileum and likely jejunum)    1. Small bowel crohn's disease:    Current medication:    Ustekinumab every 8 weeks (started 5/2021)   Ustekinumab every 6 weeks (Spring 2022)  Current clinical disease activity: remission, HBI 0   Last endoscopic disease activity: 11/30/22: SES-CD: 0  Last radiographic disease assessment: MRE 1/17/22 with moderate segmental length of ileum (10cm approx) with diffuse wall thickening consistent with acute and chronic inflammation.    Marybel was recently hospitalized with an SBO on 3/13, improved with conservative management. CT a/p showed a possible 6 mm polyp at the transition point, which was in the proximal/mid small bowel in the RLQ.     She is now back to her baseline of clinical remission.   Today we spent some time discussing the possible causes of her recent SBO. This remains unclear, but the presence of a polyp/mass is a possibility per the CT scan. She will have an MRE performed in early April to further characterize this. Regardless, it may be best to pursue a balloon enteroscopy for direct visualization of that area. She is interested in conceiving soon so it would be helpful to examine the area prior to conception. For now, she and Syd will hold off from attempted conception.     PLAN  --Follow up on MRE scheduled for April   -------if there is a growth, will plan for a balloon enteroscopy with an advanced gastroenterologist (likely anterograde). Regardless, this may be beneficial.   -------Hold off from attempted conception for now, until we  characterize the small bowel better.   --Continue Stelara every 6 weeks     Colon cancer screening:  Given disease is limited to small bowel, colon cancer screening is recommended at age 45.     Misc:  -- Avoid tobacco use  -- Avoid NSAIDs as there is potentially a 25% chance of causing an IBD flare    RTC 3 months      Earline Ponce MD  Associate Professor of Medicine  Division of Gastroenterology, Hepatology and Nutrition  [facility]    CROHN'S HISTORY:  Age at diagnosis: 2012  Extent of disease: small bowel   Disease phenotype: inflammatory  Chayito-anal disease: none  Prior IBD surgeries: none  Prior IBD Medications:    50 mg 6MP    DRUG MONITORING  TPMT enzyme activity: 40.4 (6/14/2012)    6-TGN/6-MMPN levels: --    Biologic concentration:  Usteinumab 2/2022 = 1.6, moved to every 6 weeks     HPI:   Here for follow-up.   Getting  in 2 weeks!!  Bowel pattern is 1 BM daily. Occasional constipation and bloating.  Nutrition goal: tries to be gluten free.      HBI:  Overall patient well being (prior day): 0 (Very well)  Abdominal pain (prior day): 0 (None)  Number of liquid or soft stools (prior day): 0 (1 point per stool)  Abdominal mass on exam: 0 (None)  Complications (1 point for each):   None    Remission <5  Mild activity 5-7  Moderate activity 8-16  Severe > 16    Extra intestinal manifestations of IBD:  No uveitis/episcleritis  No aphthous ulcers   No arthritis   No erythema nodosum/pyoderma gangrenosum.     Interval history, 11/2023  Had anemia shortly after having a 2 week period in the setting of an IUD. After IUD was removed, periods have normalized. Most recent Hgb in July 14.5.     HBI:  Overall patient well being (prior day): 0 (Very well)  Abdominal pain (prior day): 0 (None)  Number of liquid or soft stools (prior day): 0 (1 point per stool)  Abdominal mass on exam: 0 (None)  Complications (1 point for each): None    Interval history, 2/2024  Feeling well today. No GI complaints.  No waning effect on Stelara q6w.     Going to [facility] next month.    HBI:  Overall patient well being (prior day): 0 (Very well)  Abdominal pain (prior day): 0 (None)  Number of liquid or soft stools (prior day): 0 (1 point per stool)  Abdominal mass on exam: 0 (None)  Complications (1 point for each):   None    Interval history, 11/2024  Summer some bloating, occasional constipation. Recently diagnosed with cataracts. Still taking every 6 weeks. Actively trying to conceive. Saw a fertility specialist.     HBI:  Overall patient well being (prior day): 0 (Very well)  Abdominal pain (prior day): 0 (None)  Number of liquid or soft stools (prior day): 0 (1 point per stool)  Abdominal mass on exam: 0 (None)  Complications (1 point for each):   None    Extra intestinal manifestations of IBD:  No uveitis/episcleritis  No aphthous ulcers   No arthritis   No erythema nodosum/pyoderma gangrenosum.       Interval history, 3/2025 (in person)    Hospitalized with an SBO on 3/13, improved with conservative management. CT a/p showed a possible 6 mm polyp at the transition point, which was in the proximal/mid small bowel in the RLQ.     MRE scheduled for 4/2. This was in the middle of her injection interval (every 6 weeks).    Feeling completely back to baseline. Having 1 formed BM per day. No weight loss, urgency, or blood in stool. No abdominal pain. Tolerating all foods.         ROS:  Constitutional, HEENT, cardiovascular, pulmonary, GI, , musculoskeletal, neuro, skin, endocrine and psych systems are negative, except as otherwise noted.     PERTINENT PAST MEDICAL HISTORY:  Past Medical History:   Diagnosis Date     Abnormal Pap smear of cervix 2016     Autoimmune disease 2012    Crohns     Crohn's disease (H) 04/08/2021     Hypertension 12/2020    Unsure it previously caused by steroid meds     SBO (small bowel obstruction) (H) 2020     Uncomplicated asthma     Used inhaler in childhood, no issues as adult       PREVIOUS  SURGERIES:  Past Surgical History:   Procedure Laterality Date     COLONOSCOPY       COLONOSCOPY N/A 11/30/2022    Procedure: COLONOSCOPY, WITH BIOPSY;  Surgeon: Shiv Jones MD;  Location: [facility] OR     EGD       VA ABLATE HEART DYSRHYTHM FOCUS  1993     ALLERGIES:     Allergies   Allergen Reactions     Cats Cough     Other Environmental Allergy Itching and Visual Disturbance       PERTINENT MEDICATIONS:    Current Outpatient Medications:      acetaminophen (TYLENOL) 500 MG tablet, Take 500-1,000 mg by mouth every 6 hours as needed for mild pain or pain., Disp: , Rfl:      Olopatadine HCl (PATADAY OP), Apply 1 drop to eye as needed (seasonal allergies)., Disp: , Rfl:      Prenatal Vit-Fe Fumarate-FA (PRENATAL MULTIVITAMIN  PLUS IRON) 27-1 MG TABS, Take 1 tablet by mouth daily., Disp: , Rfl:      ustekinumab (STELARA) 90 MG/ML, INJECT 1 ML (90 MG) UNDER THE SKIN EVERY 6 WEEKS., Disp: 1 mL, Rfl: 3     lactobacillus rhamnosus, GG, (CULTURELL) capsule, Take 1 capsule by mouth daily. (Patient not taking: Reported on 3/25/2025), Disp: , Rfl:     SOCIAL HISTORY:  Social History     Socioeconomic History     Marital status:      Spouse name: Not on file     Number of children: Not on file     Years of education: Not on file     Highest education level: Not on file   Occupational History     Not on file   Tobacco Use     Smoking status: Never     Smokeless tobacco: Never   Substance and Sexual Activity     Alcohol use: Yes     Comment: occasional     Drug use: Never     Sexual activity: Yes     Partners: Male     Birth control/protection: Condom, None   Other Topics Concern     Parent/sibling w/ CABG, MI or angioplasty before 65F 55M? No   Social History Narrative     Not on file     Social Drivers of Health     Financial Resource Strain: Low Risk  (3/13/2025)    Financial Resource Strain      Within the past 12 months, have you or your family members you live with been unable to get utilities (heat,  electricity) when it was really needed?: No   Food Insecurity: Low Risk  (3/13/2025)    Food Insecurity      Within the past 12 months, did you worry that your food would run out before you got money to buy more?: No      Within the past 12 months, did the food you bought just not last and you didn t have money to get more?: No   Transportation Needs: Low Risk  (3/13/2025)    Transportation Needs      Within the past 12 months, has lack of transportation kept you from medical appointments, getting your medicines, non-medical meetings or appointments, work, or from getting things that you need?: No   Physical Activity: Not on file   Stress: Not on file   Social Connections: Not on file   Interpersonal Safety: Low Risk  (3/13/2025)    Interpersonal Safety      Do you feel physically and emotionally safe where you currently live?: Yes      Within the past 12 months, have you been hit, slapped, kicked or otherwise physically hurt by someone?: No      Within the past 12 months, have you been humiliated or emotionally abused in other ways by your partner or ex-partner?: No   Housing Stability: Low Risk  (3/13/2025)    Housing Stability      Do you have housing? : Yes      Are you worried about losing your housing?: No       FAMILY HISTORY:  Cousin has UC  Family History   Problem Relation Age of Onset     Other - See Comments Mother         Hysterectomy     Hypertension Father      Cerebrovascular Disease Maternal Grandmother 50     Colon Cancer Maternal Grandfather         60s     Melanoma Maternal Grandfather      Diabetes Type 1 Paternal Grandmother      Other - See Comments Paternal Grandmother         uterine prolapse     Heart Disease Paternal Grandfather 50        Heart attack     Hypertension Cousin        Past/family/social history reviewed and no changes    PHYSICAL EXAMINATION:  Constitutional: aaox3, cooperative, pleasant, not dyspneic/diaphoretic, no acute distress  Vitals reviewed: BP (!) 146/99   Pulse 87   " Ht 1.626 m (5' 4\")   Wt 61.2 kg (135 lb)   LMP 03/08/2025 (Approximate)   SpO2 98%   BMI 23.17 kg/m    Wt:   Wt Readings from Last 2 Encounters:   03/25/25 61.2 kg (135 lb)   03/13/25 61.2 kg (135 lb)      Constitutional - general appearance is well and in no acute distress. Body habitus normal  Eyes - No redness or discharge  Respiratory - No cough, unlabored breathing  Abdomen - Non tender to palpation, no rebound or guarding  Skin - No discoloration or lesions  Neurological - No tremors  Psychiatric - No anxiety, alert & oriented      Again, thank you for allowing me to participate in the care of your patient.        Sincerely,        Earline Ponce MD    Electronically signed"

## 2025-04-02 ENCOUNTER — ANCILLARY PROCEDURE (OUTPATIENT)
Dept: MRI IMAGING | Facility: CLINIC | Age: 35
End: 2025-04-02
Attending: INTERNAL MEDICINE
Payer: COMMERCIAL

## 2025-04-02 DIAGNOSIS — K50.112 CROHN'S DISEASE OF COLON WITH INTESTINAL OBSTRUCTION (H): ICD-10-CM

## 2025-04-02 PROCEDURE — 72197 MRI PELVIS W/O & W/DYE: CPT

## 2025-04-02 PROCEDURE — 255N000002 HC RX 255 OP 636: Performed by: INTERNAL MEDICINE

## 2025-04-02 PROCEDURE — A9585 GADOBUTROL INJECTION: HCPCS | Performed by: INTERNAL MEDICINE

## 2025-04-02 RX ORDER — GADOBUTROL 604.72 MG/ML
0.1 INJECTION INTRAVENOUS ONCE
Status: COMPLETED | OUTPATIENT
Start: 2025-04-02 | End: 2025-04-02

## 2025-04-02 RX ADMIN — GADOBUTROL 6 ML: 604.72 INJECTION INTRAVENOUS at 11:14

## 2025-04-05 ENCOUNTER — HEALTH MAINTENANCE LETTER (OUTPATIENT)
Age: 35
End: 2025-04-05

## 2025-04-07 ENCOUNTER — LAB (OUTPATIENT)
Dept: LAB | Facility: CLINIC | Age: 35
End: 2025-04-07
Payer: COMMERCIAL

## 2025-04-07 DIAGNOSIS — K50.90 CROHN'S DISEASE (H): ICD-10-CM

## 2025-04-07 DIAGNOSIS — K50.112 CROHN'S DISEASE OF COLON WITH INTESTINAL OBSTRUCTION (H): ICD-10-CM

## 2025-04-07 LAB
ALBUMIN SERPL BCG-MCNC: 4.5 G/DL (ref 3.5–5.2)
ALP SERPL-CCNC: 49 U/L (ref 40–150)
ALT SERPL W P-5'-P-CCNC: 14 U/L (ref 0–50)
ANION GAP SERPL CALCULATED.3IONS-SCNC: 11 MMOL/L (ref 7–15)
AST SERPL W P-5'-P-CCNC: 24 U/L (ref 0–45)
BASOPHILS # BLD AUTO: 0 10E3/UL (ref 0–0.2)
BASOPHILS NFR BLD AUTO: 1 %
BILIRUB SERPL-MCNC: 0.4 MG/DL
BUN SERPL-MCNC: 7.9 MG/DL (ref 6–20)
CALCIUM SERPL-MCNC: 9.8 MG/DL (ref 8.8–10.4)
CHLORIDE SERPL-SCNC: 103 MMOL/L (ref 98–107)
CREAT SERPL-MCNC: 0.62 MG/DL (ref 0.51–0.95)
CRP SERPL-MCNC: <3 MG/L
EGFRCR SERPLBLD CKD-EPI 2021: >90 ML/MIN/1.73M2
EOSINOPHIL # BLD AUTO: 0.2 10E3/UL (ref 0–0.7)
EOSINOPHIL NFR BLD AUTO: 2 %
ERYTHROCYTE [DISTWIDTH] IN BLOOD BY AUTOMATED COUNT: 12.2 % (ref 10–15)
GLUCOSE SERPL-MCNC: 96 MG/DL (ref 70–99)
HCO3 SERPL-SCNC: 25 MMOL/L (ref 22–29)
HCT VFR BLD AUTO: 41.1 % (ref 35–47)
HGB BLD-MCNC: 14.2 G/DL (ref 11.7–15.7)
IMM GRANULOCYTES # BLD: 0 10E3/UL
IMM GRANULOCYTES NFR BLD: 0 %
LYMPHOCYTES # BLD AUTO: 1.7 10E3/UL (ref 0.8–5.3)
LYMPHOCYTES NFR BLD AUTO: 23 %
MCH RBC QN AUTO: 31 PG (ref 26.5–33)
MCHC RBC AUTO-ENTMCNC: 34.5 G/DL (ref 31.5–36.5)
MCV RBC AUTO: 90 FL (ref 78–100)
MONOCYTES # BLD AUTO: 0.8 10E3/UL (ref 0–1.3)
MONOCYTES NFR BLD AUTO: 10 %
NEUTROPHILS # BLD AUTO: 4.9 10E3/UL (ref 1.6–8.3)
NEUTROPHILS NFR BLD AUTO: 64 %
PLATELET # BLD AUTO: 306 10E3/UL (ref 150–450)
POTASSIUM SERPL-SCNC: 4.2 MMOL/L (ref 3.4–5.3)
PROT SERPL-MCNC: 7.3 G/DL (ref 6.4–8.3)
RBC # BLD AUTO: 4.58 10E6/UL (ref 3.8–5.2)
SODIUM SERPL-SCNC: 139 MMOL/L (ref 135–145)
WBC # BLD AUTO: 7.6 10E3/UL (ref 4–11)

## 2025-04-07 PROCEDURE — 85025 COMPLETE CBC W/AUTO DIFF WBC: CPT

## 2025-04-07 PROCEDURE — 82542 COL CHROMOTOGRAPHY QUAL/QUAN: CPT | Mod: 90

## 2025-04-07 PROCEDURE — 86140 C-REACTIVE PROTEIN: CPT

## 2025-04-07 PROCEDURE — 99000 SPECIMEN HANDLING OFFICE-LAB: CPT

## 2025-04-07 PROCEDURE — 80053 COMPREHEN METABOLIC PANEL: CPT

## 2025-04-07 PROCEDURE — 36415 COLL VENOUS BLD VENIPUNCTURE: CPT

## 2025-04-07 PROCEDURE — 80299 QUANTITATIVE ASSAY DRUG: CPT | Mod: 90

## 2025-04-22 ENCOUNTER — PATIENT OUTREACH (OUTPATIENT)
Dept: GASTROENTEROLOGY | Facility: CLINIC | Age: 35
End: 2025-04-22
Payer: COMMERCIAL

## 2025-04-22 ENCOUNTER — PREP FOR PROCEDURE (OUTPATIENT)
Dept: GASTROENTEROLOGY | Facility: CLINIC | Age: 35
End: 2025-04-22
Payer: COMMERCIAL

## 2025-04-22 DIAGNOSIS — Z01.818 PRE-OP EXAM: Primary | ICD-10-CM

## 2025-04-22 DIAGNOSIS — K63.89 SMALL BOWEL POLYP: ICD-10-CM

## 2025-04-22 DIAGNOSIS — K52.9 INFLAMMATION OF SMALL INTESTINE: Primary | ICD-10-CM

## 2025-04-22 NOTE — TELEPHONE ENCOUNTER
Contacted patient to discuss request from Dr Turcios and AE review. Left VM.      Procedure/Imaging/Clinic: Antegrade double balloon enteroscopy   Physician: Davy/Jessica/Reema   Timing: next avail   Scope time needed: 60 min   Fluoro/C-arm needed: No   Anesthesia: Gen   Dx: small bowel inflammation, possible small bowel polyp   Tier: 3   Location: [facility] OR   OK to schedule while attending: yes   Header of letter for pt communication: Antegrade double balloon enteroscopy     1130  Pt called back, would like procedure done at earliest availability, offered 4/29 with Dr Lopez. Depending on when H & P is completed, will plan on this date.    Explained OR will call 1-2 days prior to procedure date with arrival time, will need a , someone to stay with them for 24 hours and should stay in town for 24 hours (within 45 min of Hospital) post procedure    Patient needs to get pre-op physical completed. If outside  health system will need physical faxed to number [phone]     If you do not get a preop physical, your procedure could be cancelled, patient voiced understanding*    Preop Plan: PAC referral placed and provided patient with scheduling number. Pt will also contact her PCP office to see if able to get in before 4/29    Does patient have any history of gastric bypass/gastric surgery/altered panc/bili anatomy?none    Does patient have [employer] insurance?: BCBS    Med Review    Blood thinner -  none  ASA - none  Diabetic - none  Any meds by injection or mouth for weight loss or diabetes- none    [facility]    A pre-op nurse will call 1-2 days prior to the procedure.    NPO/Prep:   Clear liquid diet X 24 hours and miralax 34gm evening before discussed. Will send details via [employer] procedure letter.     Verbalized understanding of all instructions. All questions answered.     Procedure order placed, message routed to OR       Argenis Spaulding, RN, BSN,   Advanced  Gastroenterology  Care coordinator

## 2025-04-22 NOTE — TELEPHONE ENCOUNTER
Contacted pt to update that 4/29 is not available, but 4/30 is open. Pt in agreement with 4/30, PAC visit scheduled for 4/28. Msg routed to OR  and referring providers office as update

## 2025-04-24 NOTE — TELEPHONE ENCOUNTER
FUTURE VISIT INFORMATION      SURGERY INFORMATION:  Date: 4/30/25  Location:  OR  Surgeon:  Genoveva Lopez MD   Anesthesia Type:  General  Procedure: ENTEROSCOPY, Antegrade double balloon   Consult: 3/25/25    RECORDS REQUESTED FROM:       Primary Care Provider: no current pcp    Pertinent Medical History: Inflammation of small intestine; Small bowel polyp; Crohn's disease (H); Hypertension; SBO; Autoimmune disease; Uncomplicated asthma; Painful menstruation; IUD; Anemia, iron deficiency;

## 2025-04-28 ENCOUNTER — OFFICE VISIT (OUTPATIENT)
Dept: SURGERY | Facility: CLINIC | Age: 35
End: 2025-04-28
Attending: INTERNAL MEDICINE
Payer: COMMERCIAL

## 2025-04-28 ENCOUNTER — PRE VISIT (OUTPATIENT)
Dept: SURGERY | Facility: CLINIC | Age: 35
End: 2025-04-28

## 2025-04-28 ENCOUNTER — ANESTHESIA EVENT (OUTPATIENT)
Dept: SURGERY | Facility: CLINIC | Age: 35
End: 2025-04-28
Payer: COMMERCIAL

## 2025-04-28 VITALS
WEIGHT: 133.1 LBS | OXYGEN SATURATION: 95 % | TEMPERATURE: 98.1 F | HEIGHT: 64 IN | RESPIRATION RATE: 16 BRPM | BODY MASS INDEX: 22.72 KG/M2 | DIASTOLIC BLOOD PRESSURE: 89 MMHG | HEART RATE: 97 BPM | SYSTOLIC BLOOD PRESSURE: 126 MMHG

## 2025-04-28 DIAGNOSIS — Z01.818 PRE-OP EXAM: ICD-10-CM

## 2025-04-28 DIAGNOSIS — K63.89 SMALL BOWEL POLYP: ICD-10-CM

## 2025-04-28 DIAGNOSIS — Z01.818 PREOP EXAMINATION: Primary | ICD-10-CM

## 2025-04-28 DIAGNOSIS — K52.9 INFLAMMATION OF SMALL INTESTINE: ICD-10-CM

## 2025-04-28 PROCEDURE — 99203 OFFICE O/P NEW LOW 30 MIN: CPT | Performed by: PHYSICIAN ASSISTANT

## 2025-04-28 ASSESSMENT — LIFESTYLE VARIABLES: TOBACCO_USE: 0

## 2025-04-28 ASSESSMENT — ENCOUNTER SYMPTOMS: SEIZURES: 0

## 2025-04-28 ASSESSMENT — PAIN SCALES - GENERAL: PAINLEVEL_OUTOF10: NO PAIN (0)

## 2025-04-28 NOTE — PATIENT INSTRUCTIONS
Name:  Marybel Redd   MRN:  [id number]   :  1990   Today's Date:  2025         You were seen today for a pre-operative assessment in the:    Pre-operative Anesthesia Assessment Center(PAC)  [facility]  [address] [place] [id number]  phone [phone]      You will be receiving a call with location, date, arrival time and diet instructions from Preadmission Nursing at your surgical site:    -[facility]: [phone]      Anesthesia recommendations for medications:    Hold Aspirin for 7 days before procedure.  Hold Multivitamins for 7 days before procedure.   Hold Herbal medications and Supplements for 7 days before procedure. (Culturell)  Hold Ibuprofen for 1 day before procedure.   Hold Naproxen for 4 days before procedure.     No alcohol or cannabis products for 24 hours before your procedure       Special instructions for anticoagulation medications:        Please DO NOT take the following medications the day of procedure:  Continue to hold supplements.      Please take these medications the day of procedure:  Tylenol as needed  Eye drop as needed        How do I prepare myself?  - Please take 2 showers (one the night prior to surgery and one the morning of surgery) using Scrubcare or Hibiclens soap.    Use this soap only from the neck to your toes.     Leave the soap on your skin for one minute--then rinse thoroughly.      You may use your own shampoo and conditioner. No other hair products.   - Please remove all jewelry and body piercings.  - No lotions, deodorants or fragrance.  - No makeup or fingernail polish.   - Bring your ID and insurance card.    -If you have a Deep Brain Stimulator, a Spinal Cord Stimulator, or any implanted Neuro Device, you must bring the remote to your appointment       For further questions regarding your surgery please call your surgeon's office.

## 2025-04-28 NOTE — H&P
Pre-Operative H & P     CC:  Preoperative exam to assess for increased cardiopulmonary risk while undergoing surgery and anesthesia.    Date of Encounter: 4/28/2025  Primary Care Physician:  No Ref-Primary, Physician     Reason for visit:   Encounter Diagnoses   Name Primary?    Inflammation of small intestine Yes    Small bowel polyp     Preop examination     Pre-op exam        HPI  Marybel Redd is a 34 year old female who presents for pre-operative H & P in preparation for  Procedure Information       Case: [id number] Date/Time: 04/30/25 1010    Procedure: ENTEROSCOPY, Antegrade double balloon (Mouth)    Anesthesia type: General    Diagnosis:       Inflammation of small intestine [K52.9]      Small bowel polyp [K63.89]    Pre-op diagnosis:       Inflammation of small intestine [K52.9]      Small bowel polyp [K63.89]    Location:  OR [facility] /  OR    Providers: Genoveva Lopez MD            Patient is being evaluated for comorbid conditions of asthma, chronic immunosuppression.    Ms. Redd has a history of small bowel Crohn's disease. She was  recently hospitalized with an SBO on 3/13, improved with conservative management. CT a/p showed a possible 6 mm polyp at the transition point, which was in the proximal/mid small bowel in the RLQ. The etiology of her SBO is unclear, but the presence of a polyp/mass is a possibility per the CT scan. She now presents for the above procedure to further characterize this.     History is obtained from the patient and chart review    Hx of abnormal bleeding or anti-platelet use: denies    Menstrual history: Patient's last menstrual period was 04/22/2025 (approximate).:       Past Medical History  Past Medical History:   Diagnosis Date    Abnormal Pap smear of cervix 2016    Autoimmune disease 2012    Crohns    Crohn's disease (H) 04/08/2021    Hypertension 12/2020    Unsure it previously caused by steroid meds    SBO (small bowel obstruction) (H) 2020    Uncomplicated  asthma     Used inhaler in childhood, no issues as adult       Past Surgical History  Past Surgical History:   Procedure Laterality Date    COLONOSCOPY      COLONOSCOPY N/A 11/30/2022    Procedure: COLONOSCOPY, WITH BIOPSY;  Surgeon: Shiv Jones MD;  [place] [facility] [place]    DENTAL SURGERY      wisdom teeth    EGD      VT ABLATE HEART DYSRHYTHM FOCUS  1993       Prior to Admission Medications  Current Outpatient Medications   Medication Sig Dispense Refill    acetaminophen (TYLENOL) 500 MG tablet Take 500-1,000 mg by mouth every 6 hours as needed for mild pain or pain.      lactobacillus rhamnosus, GG, (CULTURELL) capsule Take 1 capsule by mouth daily.      Olopatadine HCl (PATADAY OP) Apply 1 drop to eye as needed (seasonal allergies).      Prenatal Vit-Fe Fumarate-FA (PRENATAL MULTIVITAMIN  PLUS IRON) 27-1 MG TABS Take 1 tablet by mouth daily.      ustekinumab (STELARA) 90 MG/ML INJECT 1 ML (90 MG) UNDER THE SKIN EVERY 6 WEEKS. 1 mL 3       Allergies  Allergies   Allergen Reactions    Cats Cough    Other Environmental Allergy Itching and Visual Disturbance       Social History  Social History     Socioeconomic History    Marital status:      Spouse name: Not on file    Number of children: Not on file    Years of education: Not on file    Highest education level: Not on file   Occupational History    Not on file   Tobacco Use    Smoking status: Never    Smokeless tobacco: Never   Substance and Sexual Activity    Alcohol use: Yes     Comment: occasional    Drug use: Never    Sexual activity: Yes     Partners: Male     Birth control/protection: Condom, None   Other Topics Concern    Parent/sibling w/ CABG, MI or angioplasty before 65F 55M? No   Social History Narrative    Not on file     Social Drivers of Health     Financial Resource Strain: Low Risk  (3/13/2025)    Financial Resource Strain     Within the past 12 months, have you or your family members you live with been unable to get utilities (heat,  electricity) when it was really needed?: No   Food Insecurity: Low Risk  (3/13/2025)    Food Insecurity     Within the past 12 months, did you worry that your food would run out before you got money to buy more?: No     Within the past 12 months, did the food you bought just not last and you didn t have money to get more?: No   Transportation Needs: Low Risk  (3/13/2025)    Transportation Needs     Within the past 12 months, has lack of transportation kept you from medical appointments, getting your medicines, non-medical meetings or appointments, work, or from getting things that you need?: No   Physical Activity: Not on file   Stress: Not on file   Social Connections: Not on file   Interpersonal Safety: Low Risk  (3/13/2025)    Interpersonal Safety     Do you feel physically and emotionally safe where you currently live?: Yes     Within the past 12 months, have you been hit, slapped, kicked or otherwise physically hurt by someone?: No     Within the past 12 months, have you been humiliated or emotionally abused in other ways by your partner or ex-partner?: No   Housing Stability: Low Risk  (3/13/2025)    Housing Stability     Do you have housing? : Yes     Are you worried about losing your housing?: No       Family History  Family History   Problem Relation Age of Onset    Other - See Comments Mother         Hysterectomy    Hypertension Father     Cerebrovascular Disease Maternal Grandmother 50    Colon Cancer Maternal Grandfather         60s    Melanoma Maternal Grandfather     Diabetes Type 1 Paternal Grandmother     Other - See Comments Paternal Grandmother         uterine prolapse    Heart Disease Paternal Grandfather 50        Heart attack    Hypertension Cousin     Anesthesia Reaction No family hx of     Deep Vein Thrombosis (DVT) No family hx of        Review of Systems  The complete review of systems is negative other than noted in the HPI or here.     Anesthesia Evaluation   Pt has had prior anesthetic.   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        METS/Exercise Tolerance: >4 METS    Hematologic:  - neg hematologic  ROS  (-) history of blood clots and history of blood transfusion   Musculoskeletal:  - neg musculoskeletal ROS     GI/Hepatic: Comment: SBO in March 2025, managed conservatively    (+)       Inflammatory bowel disease,          (-) GERD and liver disease   Renal/Genitourinary:  - neg Renal ROS  (-) renal disease   Endo:  - neg endo ROS  (-) Type II DM   Psychiatric/Substance Use:  - neg psychiatric ROS     Infectious Disease:  - neg infectious disease ROS     Malignancy:  - neg malignancy ROS     Other: Comment: Immunosuppression w/ ustekinumab             /89 (BP Location: Right arm, Patient Position: Sitting, Cuff Size: Adult Regular)   Pulse 97   Temp 98.1  F (36.7  C) (Oral)   Resp 16   Ht 1.626 m (5' 4\")   Wt 60.4 kg (133 lb 1.6 oz)   LMP 04/22/2025 (Approximate)   SpO2 95%   Breastfeeding No   BMI 22.85 kg/m      Physical Exam  Constitutional: Awake, alert, cooperative, no apparent distress, and appears stated age.  Eyes: Pupils equal, round and reactive to light, extra ocular muscles intact, sclera clear, conjunctiva normal.  HENT: Normocephalic, oral pharynx with moist mucus membranes, good dentition. No goiter appreciated. No removable dental hardware.  Respiratory: Clear to auscultation bilaterally, no crackles or wheezing. No SOB when supine.  Cardiovascular: Regular rate and rhythm, normal S1 and S2, and no murmur noted.  Carotids +2, no bruits. No edema. Palpable pulses to radial, DP and PT arteries.   GI: Normal bowel sounds, soft, non-distended, non-tender, no masses palpated.    Lymph/Hematologic: No cervical " lymphadenopathy and no supraclavicular lymphadenopathy.  Genitourinary:  deferred  Skin: Warm and dry.  No rashes.   Musculoskeletal: full ROM of neck. There is no redness, warmth, or swelling of the joints. Gross motor strength is normal.    Neurologic: Awake, alert, oriented to name, place and time. Cranial nerves II-XII are grossly intact. Gait is normal. Ambulates from chair to exam table, seats self, lies supine and sits back up w/o assistance.  Neuropsychiatric: Calm, cooperative. Normal affect. Pleasant. Answers questions appropriately, follows commands w/o difficulty.        PRIOR LABS/DIAGNOSTIC STUDIES:    All pertinent records, results, labs and imaging personally reviewed        Assessment    Marybel Redd is a 34 year old female seen as a PAC referral for risk assessment and optimization for anesthesia.    Plan/Recommendations  Pt will be optimized for the proposed procedure.  See below for details on the assessment, risk, and preoperative recommendations    NEUROLOGY  - No history of TIA, CVA or seizure    -Post Op delirium risk factors:  No risk identified    ENT  - No current airway concerns.  Will need to be reassessed day of surgery.  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    - METS (Metabolic Equivalents)  Patient performs 4 or more METS exercise without symptoms             Total Score: 0      RCRI-Very low risk: Class 1 0.4% complication rate             Total Score: 0        PULMONARY  JHOANA Low Risk             Total Score: 0      - Denies asthma or inhaler use  - Tobacco History    History   Smoking Status    Never   Smokeless Tobacco    Never       GI  - Denies GERD  - Crohn's disease; last dose ustekinumab 4/8/25  - SBO in March 2025, managed conservatively    PONV Medium Risk  Total Score: 2           1 AN PONV: Pt is Female    1 AN PONV: Patient is not a current smoker          ENDOCRINE    - BMI:  "Estimated body mass index is 22.85 kg/m  as calculated from the following:    Height as of this encounter: 1.626 m (5' 4\").    Weight as of this encounter: 60.4 kg (133 lb 1.6 oz).  Healthy Weight (BMI 18.5-24.9)  - No history of Diabetes Mellitus    HEME  VTE Low Risk 0.26%             Total Score: 0      - No history of abnormal bleeding or antiplatelet use.      MSK  Patient is NOT Frail             Total Score: 0          The patient is aware that the final anesthesia plan will be decided by the assigned anesthesia provider on the date of service.      The patient is optimized for their procedure. AVS with information on surgery time/arrival time, meds and NPO status given by nursing staff. No further diagnostic testing indicated.      On the day of service:     Prep time: 12 minutes  Visit time: 17 minutes  Documentation time: 10 minutes  ------------------------------------------  Total time: 39 minutes      Anushka Ordaz PA-C  Preoperative Assessment Center  [facility]  Cl[facility] and [facility]  Phone: [phone]  Fax: [phone]    "

## 2025-04-29 ASSESSMENT — ENCOUNTER SYMPTOMS: SEIZURES: 0

## 2025-04-29 ASSESSMENT — LIFESTYLE VARIABLES: TOBACCO_USE: 0

## 2025-04-30 ENCOUNTER — HOSPITAL ENCOUNTER (OUTPATIENT)
Facility: CLINIC | Age: 35
Discharge: HOME | End: 2025-04-30
Attending: INTERNAL MEDICINE | Admitting: INTERNAL MEDICINE
Payer: COMMERCIAL

## 2025-04-30 ENCOUNTER — ANESTHESIA (OUTPATIENT)
Dept: SURGERY | Facility: CLINIC | Age: 35
End: 2025-04-30
Payer: COMMERCIAL

## 2025-04-30 VITALS
BODY MASS INDEX: 22.71 KG/M2 | SYSTOLIC BLOOD PRESSURE: 112 MMHG | DIASTOLIC BLOOD PRESSURE: 68 MMHG | HEART RATE: 89 BPM | WEIGHT: 133 LBS | RESPIRATION RATE: 12 BRPM | OXYGEN SATURATION: 100 % | HEIGHT: 64 IN | TEMPERATURE: 97.7 F

## 2025-04-30 LAB
HCG SERPL QL: NEGATIVE
SMALL BOWEL ENTEROSCOPY: NORMAL

## 2025-04-30 PROCEDURE — 272N000001 HC OR GENERAL SUPPLY STERILE: Performed by: INTERNAL MEDICINE

## 2025-04-30 PROCEDURE — 272N000002 HC OR SUPPLY OTHER OPNP: Performed by: INTERNAL MEDICINE

## 2025-04-30 PROCEDURE — 360N000076 HC SURGERY LEVEL 3, PER MIN: Performed by: INTERNAL MEDICINE

## 2025-04-30 PROCEDURE — 370N000017 HC ANESTHESIA TECHNICAL FEE, PER MIN: Performed by: INTERNAL MEDICINE

## 2025-04-30 PROCEDURE — 84703 CHORIONIC GONADOTROPIN ASSAY: CPT | Performed by: ANESTHESIOLOGY

## 2025-04-30 PROCEDURE — 250N000009 HC RX 250

## 2025-04-30 PROCEDURE — 36415 COLL VENOUS BLD VENIPUNCTURE: CPT | Performed by: ANESTHESIOLOGY

## 2025-04-30 PROCEDURE — 250N000011 HC RX IP 250 OP 636

## 2025-04-30 PROCEDURE — 88305 TISSUE EXAM BY PATHOLOGIST: CPT | Mod: TC | Performed by: INTERNAL MEDICINE

## 2025-04-30 PROCEDURE — 710N000009 HC RECOVERY PHASE 1, LEVEL 1, PER MIN: Performed by: INTERNAL MEDICINE

## 2025-04-30 PROCEDURE — 88305 TISSUE EXAM BY PATHOLOGIST: CPT | Mod: 26 | Performed by: PATHOLOGY

## 2025-04-30 PROCEDURE — 710N000012 HC RECOVERY PHASE 2, PER MINUTE: Performed by: INTERNAL MEDICINE

## 2025-04-30 PROCEDURE — 258N000003 HC RX IP 258 OP 636

## 2025-04-30 PROCEDURE — 250N000025 HC SEVOFLURANE, PER MIN: Performed by: INTERNAL MEDICINE

## 2025-04-30 PROCEDURE — 250N000009 HC RX 250: Performed by: INTERNAL MEDICINE

## 2025-04-30 PROCEDURE — 999N000141 HC STATISTIC PRE-PROCEDURE NURSING ASSESSMENT: Performed by: INTERNAL MEDICINE

## 2025-04-30 RX ORDER — MAGNESIUM HYDROXIDE 1200 MG/15ML
LIQUID ORAL PRN
Status: DISCONTINUED | OUTPATIENT
Start: 2025-04-30 | End: 2025-04-30 | Stop reason: HOSPADM

## 2025-04-30 RX ORDER — ONDANSETRON 2 MG/ML
4 INJECTION INTRAMUSCULAR; INTRAVENOUS
Status: CANCELLED | OUTPATIENT
Start: 2025-04-30

## 2025-04-30 RX ORDER — ONDANSETRON 4 MG/1
4 TABLET, ORALLY DISINTEGRATING ORAL EVERY 30 MIN PRN
Status: DISCONTINUED | OUTPATIENT
Start: 2025-04-30 | End: 2025-04-30 | Stop reason: HOSPADM

## 2025-04-30 RX ORDER — DEXAMETHASONE SODIUM PHOSPHATE 4 MG/ML
4 INJECTION, SOLUTION INTRA-ARTICULAR; INTRALESIONAL; INTRAMUSCULAR; INTRAVENOUS; SOFT TISSUE
Status: DISCONTINUED | OUTPATIENT
Start: 2025-04-30 | End: 2025-04-30 | Stop reason: HOSPADM

## 2025-04-30 RX ORDER — FENTANYL CITRATE 0.05 MG/ML
25 INJECTION, SOLUTION INTRAMUSCULAR; INTRAVENOUS EVERY 5 MIN PRN
Status: DISCONTINUED | OUTPATIENT
Start: 2025-04-30 | End: 2025-04-30 | Stop reason: HOSPADM

## 2025-04-30 RX ORDER — SODIUM CHLORIDE, SODIUM LACTATE, POTASSIUM CHLORIDE, CALCIUM CHLORIDE 600; 310; 30; 20 MG/100ML; MG/100ML; MG/100ML; MG/100ML
INJECTION, SOLUTION INTRAVENOUS CONTINUOUS
Status: DISCONTINUED | OUTPATIENT
Start: 2025-04-30 | End: 2025-04-30 | Stop reason: HOSPADM

## 2025-04-30 RX ORDER — ONDANSETRON 2 MG/ML
4 INJECTION INTRAMUSCULAR; INTRAVENOUS EVERY 30 MIN PRN
Status: DISCONTINUED | OUTPATIENT
Start: 2025-04-30 | End: 2025-04-30 | Stop reason: HOSPADM

## 2025-04-30 RX ORDER — NALOXONE HYDROCHLORIDE 0.4 MG/ML
0.1 INJECTION, SOLUTION INTRAMUSCULAR; INTRAVENOUS; SUBCUTANEOUS
Status: DISCONTINUED | OUTPATIENT
Start: 2025-04-30 | End: 2025-04-30 | Stop reason: HOSPADM

## 2025-04-30 RX ORDER — SODIUM CHLORIDE, SODIUM LACTATE, POTASSIUM CHLORIDE, CALCIUM CHLORIDE 600; 310; 30; 20 MG/100ML; MG/100ML; MG/100ML; MG/100ML
INJECTION, SOLUTION INTRAVENOUS CONTINUOUS PRN
Status: DISCONTINUED | OUTPATIENT
Start: 2025-04-30 | End: 2025-04-30

## 2025-04-30 RX ORDER — DEXAMETHASONE SODIUM PHOSPHATE 4 MG/ML
INJECTION, SOLUTION INTRA-ARTICULAR; INTRALESIONAL; INTRAMUSCULAR; INTRAVENOUS; SOFT TISSUE PRN
Status: DISCONTINUED | OUTPATIENT
Start: 2025-04-30 | End: 2025-04-30

## 2025-04-30 RX ORDER — LABETALOL HYDROCHLORIDE 5 MG/ML
10 INJECTION, SOLUTION INTRAVENOUS
Status: DISCONTINUED | OUTPATIENT
Start: 2025-04-30 | End: 2025-04-30 | Stop reason: HOSPADM

## 2025-04-30 RX ORDER — LIDOCAINE HYDROCHLORIDE 20 MG/ML
INJECTION, SOLUTION INFILTRATION; PERINEURAL PRN
Status: DISCONTINUED | OUTPATIENT
Start: 2025-04-30 | End: 2025-04-30

## 2025-04-30 RX ORDER — FENTANYL CITRATE 50 UG/ML
INJECTION, SOLUTION INTRAMUSCULAR; INTRAVENOUS PRN
Status: DISCONTINUED | OUTPATIENT
Start: 2025-04-30 | End: 2025-04-30

## 2025-04-30 RX ORDER — HYDROMORPHONE HCL IN WATER/PF 6 MG/30 ML
0.4 PATIENT CONTROLLED ANALGESIA SYRINGE INTRAVENOUS EVERY 5 MIN PRN
Status: DISCONTINUED | OUTPATIENT
Start: 2025-04-30 | End: 2025-04-30 | Stop reason: HOSPADM

## 2025-04-30 RX ORDER — PROPOFOL 10 MG/ML
INJECTION, EMULSION INTRAVENOUS PRN
Status: DISCONTINUED | OUTPATIENT
Start: 2025-04-30 | End: 2025-04-30

## 2025-04-30 RX ORDER — LIDOCAINE 40 MG/G
CREAM TOPICAL
Status: DISCONTINUED | OUTPATIENT
Start: 2025-04-30 | End: 2025-04-30 | Stop reason: HOSPADM

## 2025-04-30 RX ORDER — HYDROMORPHONE HCL IN WATER/PF 6 MG/30 ML
0.2 PATIENT CONTROLLED ANALGESIA SYRINGE INTRAVENOUS EVERY 5 MIN PRN
Status: DISCONTINUED | OUTPATIENT
Start: 2025-04-30 | End: 2025-04-30 | Stop reason: HOSPADM

## 2025-04-30 RX ORDER — ONDANSETRON 2 MG/ML
INJECTION INTRAMUSCULAR; INTRAVENOUS PRN
Status: DISCONTINUED | OUTPATIENT
Start: 2025-04-30 | End: 2025-04-30

## 2025-04-30 RX ORDER — FENTANYL CITRATE 0.05 MG/ML
50 INJECTION, SOLUTION INTRAMUSCULAR; INTRAVENOUS EVERY 5 MIN PRN
Status: DISCONTINUED | OUTPATIENT
Start: 2025-04-30 | End: 2025-04-30 | Stop reason: HOSPADM

## 2025-04-30 RX ADMIN — PROPOFOL 200 MG: 10 INJECTION, EMULSION INTRAVENOUS at 11:37

## 2025-04-30 RX ADMIN — DEXMEDETOMIDINE HYDROCHLORIDE 10 MCG: 100 INJECTION, SOLUTION INTRAVENOUS at 11:52

## 2025-04-30 RX ADMIN — LIDOCAINE HYDROCHLORIDE 60 MG: 20 INJECTION, SOLUTION INFILTRATION; PERINEURAL at 11:37

## 2025-04-30 RX ADMIN — DEXMEDETOMIDINE HYDROCHLORIDE 10 MCG: 100 INJECTION, SOLUTION INTRAVENOUS at 11:58

## 2025-04-30 RX ADMIN — SODIUM CHLORIDE, SODIUM LACTATE, POTASSIUM CHLORIDE, AND CALCIUM CHLORIDE: .6; .31; .03; .02 INJECTION, SOLUTION INTRAVENOUS at 11:31

## 2025-04-30 RX ADMIN — DEXMEDETOMIDINE HYDROCHLORIDE 10 MCG: 100 INJECTION, SOLUTION INTRAVENOUS at 11:48

## 2025-04-30 RX ADMIN — ROCURONIUM BROMIDE 10 MG: 50 INJECTION, SOLUTION INTRAVENOUS at 11:37

## 2025-04-30 RX ADMIN — FENTANYL CITRATE 50 MCG: 50 INJECTION INTRAMUSCULAR; INTRAVENOUS at 11:43

## 2025-04-30 RX ADMIN — PHENYLEPHRINE HYDROCHLORIDE 100 MCG: 10 INJECTION INTRAVENOUS at 12:33

## 2025-04-30 RX ADMIN — ONDANSETRON 4 MG: 2 INJECTION INTRAMUSCULAR; INTRAVENOUS at 11:37

## 2025-04-30 RX ADMIN — MIDAZOLAM 2 MG: 1 INJECTION INTRAMUSCULAR; INTRAVENOUS at 11:31

## 2025-04-30 RX ADMIN — FENTANYL CITRATE 50 MCG: 50 INJECTION INTRAMUSCULAR; INTRAVENOUS at 11:37

## 2025-04-30 RX ADMIN — PHENYLEPHRINE HYDROCHLORIDE 100 MCG: 10 INJECTION INTRAVENOUS at 11:46

## 2025-04-30 RX ADMIN — SUCCINYLCHOLINE CHLORIDE 80 MG: 20 INJECTION, SOLUTION INTRAMUSCULAR; INTRAVENOUS; PARENTERAL at 11:37

## 2025-04-30 RX ADMIN — DEXAMETHASONE SODIUM PHOSPHATE 4 MG: 4 INJECTION, SOLUTION INTRA-ARTICULAR; INTRALESIONAL; INTRAMUSCULAR; INTRAVENOUS; SOFT TISSUE at 11:37

## 2025-04-30 RX ADMIN — PHENYLEPHRINE HYDROCHLORIDE 100 MCG: 10 INJECTION INTRAVENOUS at 12:17

## 2025-04-30 ASSESSMENT — ACTIVITIES OF DAILY LIVING (ADL)
ADLS_ACUITY_SCORE: 52

## 2025-04-30 NOTE — LETTER
May 1, 2025      Marybel Redd  [address] [place] [address]        Dear ,    We are writing to inform you of your test results.    The biopsies obtained from the small intestine showed acute and chronic inflammation but no cancer was seen. Please ensure you discuss these results with your referring provider at your next scheduled visit.     If you have any questions or concerns, please call the clinic at the number listed above.       Sincerely,      Genoveva Lopez MD  Resulted Orders   Surgical Pathology Exam   Result Value Ref Range    Case Report       Surgical Pathology Report                         Case: [id number]                                  Authorizing Provider:  Genoveva Lopez MD        Collected:           04/30/2025 12:35 PM          Ordering Location:     [facility]          Received:            04/30/2025 12:52 PM                                 [facility] OR                                                            Pathologist:           Lucho Lockhart MD                                                                           Specimen:    Stomach, small bowel stricture                                                             Final Diagnosis       Small intestine, stricture, biopsy:  - Small bowel mucosa with nonspecific acute and chronic inflammation.  - No diagnostic histologic features of celiac disease/gluten-sensitive enteropathy, Whipple's disease, or Giardia microorganisms.  - Negative for granulomas.  - Negative for dysplasia and malignancy.      Clinical Information       The patient is a 34 yrs (as of today 5/1/2025) old female.      Gross Description       A(1). Stomach, small bowel stricture:  The specimen is received in formalin, labeled with the patient's name, medical record number and other identifying information and designated  small bowel stricture .  It consists of 2 tan soft tissue fragments 0.2 cm. Entirely submitted in one cassette.   (CHAI Oneal (ASCP) 4/30/2025 12:57 PM       Microscopic Description       A microscopic examination is performed.      Performing Labs       The technical component of this testing was completed at [facility].    Stain controls for all stains resulted within this report have been reviewed and show appropriate reactivity.       Case Images         If you have any questions or concerns, please call the clinic at the number listed above.       Sincerely,      Genoveva Lopez MD    Electronically signed

## 2025-04-30 NOTE — DISCHARGE INSTRUCTIONS
Same Day Surgery Discharge Instructions for  Sedation and General Anesthesia     It's not unusual to feel dizzy, light-headed or faint for up to 24 hours after surgery or while taking pain medication.  If you have these symptoms: sit for a few minutes before standing and have someone assist you when you get up to walk or use the bathroom.    You should rest and relax for the next 24 hours. We recommend you make arrangements to have an adult stay with you for at least 24 hours after your discharge.  Avoid hazardous and strenuous activity.    DO NOT DRIVE any vehicle or operate mechanical equipment for 24 hours following the end of your surgery.  Even though you may feel normal, your reactions may be affected by the medication you have received.    Do not drink alcoholic beverages for 24 hours following surgery.     Slowly progress to your regular diet as you feel able. It's not unusual to feel nauseated and/or vomit after receiving anesthesia.  If you develop these symptoms, drink clear liquids (apple juice, ginger ale, broth, 7-up, etc. ) until you feel better.  If your nausea and vomiting persists for 24 hours, please notify your surgeon.      All narcotic pain medications, along with inactivity and anesthesia, can cause constipation. Drinking plenty of liquids and increasing fiber intake will help.    For any questions of a medical nature, call your surgeon.    Do not make important decisions for 24 hours.    If you had general anesthesia, you may have a sore throat for a couple of days related to the breathing tube used during surgery.  You may use Cepacol lozenges to help with this discomfort.  If it worsens or if you develop a fever, contact your surgeon.     If you feel your pain is not well managed with the pain medications prescribed by your surgeon, please contact your surgeon's office to let them know so they can address your concerns.      **If you have questions or concerns about your procedure, call    Dr Lopez [phone].**

## 2025-04-30 NOTE — ANESTHESIA PROCEDURE NOTES
Airway       Patient location during procedure: OR       Procedure Start/Stop Times: 4/30/2025 11:38 AM  Staff -        Anesthesiologist:  Jaja Isidro MD       CRNA: Theresa Amin APRN CRNA       Performed By: CRNA  Consent for Airway        Urgency: elective  Indications and Patient Condition       Indications for airway management: tariq-procedural       Induction type:intravenous       Mask difficulty assessment: 1 - vent by mask    Final Airway Details       Final airway type: endotracheal airway       Successful airway: ETT - single  Endotracheal Airway Details        ETT size (mm): 7.0       Cuffed: yes       Successful intubation technique: direct laryngoscopy       DL Blade Type: MAC 3       Grade View of Cords: 1       Adjucts: stylet       Position: Right       Measured from: lips       Secured at (cm): 21       Bite block used: Molar    Post intubation assessment        Placement verified by: capnometry and chest rise        Number of attempts at approach: 1       Number of other approaches attempted: 0       Secured with: tape       Ease of procedure: easy       Dentition: Intact and Unchanged    Medication(s) Administered   Medication Administration Time: 4/30/2025 11:38 AM

## 2025-04-30 NOTE — ANESTHESIA PREPROCEDURE EVALUATION
Anesthesia Pre-Procedure Evaluation    Patient: Marybel Redd   MRN: [id number] : 1990        Procedure : Procedure(s):  ENTEROSCOPY, Antegrade double balloon          Past Medical History:   Diagnosis Date    Abnormal Pap smear of cervix 2016    Autoimmune disease 2012    Crohns    Crohn's disease (H) 2021    Hypertension 2020    Unsure it previously caused by steroid meds    SBO (small bowel obstruction) (H)     Uncomplicated asthma     Used inhaler in childhood, no issues as adult      Past Surgical History:   Procedure Laterality Date    COLONOSCOPY      COLONOSCOPY N/A 2022    Procedure: COLONOSCOPY, WITH BIOPSY;  Surgeon: Shiv Jones MD;  [place] [facility] [place]    DENTAL SURGERY      wisdom teeth    EGD      DE ABLATE HEART DYSRHYTHM FOCUS        Allergies   Allergen Reactions    Cats Cough    Other Environmental Allergy Itching and Visual Disturbance      Social History     Tobacco Use    Smoking status: Never    Smokeless tobacco: Never   Substance Use Topics    Alcohol use: Yes     Comment: occasional      Wt Readings from Last 1 Encounters:   25 60.4 kg (133 lb 1.6 oz)        Anesthesia Evaluation   Pt has had prior anesthetic.     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     (-) hypertension   METS/Exercise Tolerance: >4 METS    Hematologic:  - neg hematologic  ROS  (-) history of blood clots and history of blood transfusion   Musculoskeletal:  - neg musculoskeletal ROS     GI/Hepatic: Comment: SBO in 2025, managed conservatively    (+)       Inflammatory bowel disease,          (-) GERD and liver disease   Renal/Genitourinary:  - neg Renal ROS  (-) renal disease   Endo:  - neg endo ROS  (-)  "Type II DM   Psychiatric/Substance Use:  - neg psychiatric ROS     Infectious Disease:  - neg infectious disease ROS     Malignancy:  - neg malignancy ROS     Other: Comment: Immunosuppression w/ ustekinumab             Physical Exam    Airway        Mallampati: I   TM distance: > 3 FB      Respiratory Devices and Support         Dental       (+) Completely normal teeth      Cardiovascular   cardiovascular exam normal          Pulmonary   pulmonary exam normal                OUTSIDE LABS:  CBC:   Lab Results   Component Value Date    WBC 7.6 04/07/2025    WBC 9.0 03/14/2025    HGB 14.2 04/07/2025    HGB 13.1 03/15/2025    HCT 41.1 04/07/2025    HCT 37.6 03/14/2025     04/07/2025     03/14/2025     BMP:   Lab Results   Component Value Date     04/07/2025     03/14/2025    POTASSIUM 4.2 04/07/2025    POTASSIUM 4.2 03/15/2025    CHLORIDE 103 04/07/2025    CHLORIDE 105 03/14/2025    CO2 25 04/07/2025    CO2 19 (L) 03/14/2025    BUN 7.9 04/07/2025    BUN 4.9 (L) 03/14/2025    CR 0.62 04/07/2025    CR 0.48 (L) 03/14/2025    GLC 96 04/07/2025    GLC 79 03/14/2025     COAGS: No results found for: \"PTT\", \"INR\", \"FIBR\"  POC:   Lab Results   Component Value Date    HCG Negative 11/30/2022    HCGS Negative 03/13/2025     HEPATIC:   Lab Results   Component Value Date    ALBUMIN 4.5 04/07/2025    PROTTOTAL 7.3 04/07/2025    ALT 14 04/07/2025    AST 24 04/07/2025    ALKPHOS 49 04/07/2025    BILITOTAL 0.4 04/07/2025     OTHER:   Lab Results   Component Value Date    DALE 9.8 04/07/2025    PHOS 2.6 03/15/2025    MAG 1.7 03/15/2025    LIPASE 32 03/13/2025    TSH 1.67 08/19/2024    CRP <2.9 10/21/2022    SED 5 06/18/2024       Anesthesia Plan    ASA Status:  2    NPO Status:  NPO Appropriate    Anesthesia Type: General.     - Airway: ETT   Induction: Propofol.   Maintenance: Balanced.        Consents    Anesthesia Plan(s) and associated risks, benefits, and realistic alternatives discussed. Questions answered " and patient/representative(s) expressed understanding.     - Discussed:     - Discussed with:  Patient            Postoperative Care    Pain management: Multi-modal analgesia.   PONV prophylaxis: Dexamethasone or Solumedrol, Ondansetron (or other 5HT-3), Background Propofol Infusion     Comments:               Fawad Mcgowan MD    Clinically Significant Risk Factors Present on Admission

## 2025-04-30 NOTE — ANESTHESIA POSTPROCEDURE EVALUATION
Patient: Marybel Redd    Procedure: Procedure(s):  ENTEROSCOPY, Antegrade double balloon with small bowel biopsy       Anesthesia Type:  General    Note:  Disposition: Outpatient   Postop Pain Control: Uneventful            Sign Out: Well controlled pain   PONV: No   Neuro/Psych: Uneventful            Sign Out: Acceptable/Baseline neuro status   Airway/Respiratory: Uneventful            Sign Out: Acceptable/Baseline resp. status   CV/Hemodynamics: Uneventful            Sign Out: Acceptable CV status; No obvious hypovolemia; No obvious fluid overload   Other NRE: NONE   DID A NON-ROUTINE EVENT OCCUR? No           Last vitals:  Vitals Value Taken Time   /58 04/30/25 1345   Temp 36.5  C (97.7  F) 04/30/25 1345   Pulse 91 04/30/25 1350   Resp 14 04/30/25 1350   SpO2 97 % 04/30/25 1350   Vitals shown include unfiled device data.    Electronically Signed By: Jaja Isidro MD  April 30, 2025  2:41 PM

## 2025-05-14 ENCOUNTER — MYC REFILL (OUTPATIENT)
Dept: PHARMACY | Facility: CLINIC | Age: 35
End: 2025-05-14
Payer: COMMERCIAL

## 2025-05-14 DIAGNOSIS — K50.90 CROHN'S DISEASE (H): ICD-10-CM

## 2025-05-14 RX ORDER — USTEKINUMAB 90 MG/ML
INJECTION, SOLUTION SUBCUTANEOUS
Qty: 1 ML | Refills: 0 | Status: SHIPPED | OUTPATIENT
Start: 2025-05-14

## 2025-05-14 NOTE — TELEPHONE ENCOUNTER
Last clinic visit: 3/25/25 w/ Dr. Ponce  Next clinic visit: 5/21/25 w/ Dr. Ponce -- appointment in place to discuss change in therapy; recent enteroscopy identified active disease    Last set of labs: 4/7/25  Last Quant Gold TB: 06/2024  MTM: no longer established     Single-dose refill sent to pharmacy. Patient likely to change therapies next week.

## 2025-05-21 ENCOUNTER — VIRTUAL VISIT (OUTPATIENT)
Dept: GASTROENTEROLOGY | Facility: CLINIC | Age: 35
End: 2025-05-21
Payer: COMMERCIAL

## 2025-05-21 VITALS — HEIGHT: 64 IN | WEIGHT: 130 LBS | BODY MASS INDEX: 22.2 KG/M2

## 2025-05-21 DIAGNOSIS — K50.018 CROHN'S DISEASE OF SMALL INTESTINE WITH OTHER COMPLICATION (H): Primary | ICD-10-CM

## 2025-05-21 ASSESSMENT — PAIN SCALES - GENERAL: PAINLEVEL_OUTOF10: NO PAIN (0)

## 2025-05-21 NOTE — PATIENT INSTRUCTIONS
PLAN  ---We had an extensive discussion regarding options: Remicade/Humira, IL23i, Entyvio and surgery.   Marybel is leaning towards a medical route. She will think about these options some more and will let us know what she decides.   ----------Follow up with MTM (Vijay Matthews, PharmD) to continue this conversation   ---Will hold off from budesonide for now given Marybel is feeling well

## 2025-05-21 NOTE — LETTER
5/21/2025      Marybel Redd  [address] [place] [address]      Dear Colleague,    Thank you for referring your patient, Marybel Redd, to the [facility] [place]. Please see a copy of my visit note below.    Virtual Visit Details    Type of service:  Video Visit     Originating Location (pt. Location): Home    Distant Location (provider location):  Off-site  Platform used for Video Visit: [facility]    IBD CLINIC VISIT     CC/REFERRING MD:  Dr. Akin Bernal  REASON FOR FOLLOW UP: Crohn's    ASSESSMENT/PLAN  35 year old female with Crohn's disease of the small bowel (ileum and likely jejunum)    1. Small bowel crohn's disease:    Current medication:    Ustekinumab every 8 weeks (started 5/2021)   Ustekinumab every 6 weeks (Spring 2022)  Current clinical disease activity: remission, HBI 0   Last endoscopic disease activity:   A SBE was performed by Dr. Lopez on 4/30/2025 that showed a stenosis in the distal jejunum. This was a/w with inflammation with ulcerations.  were nonspecific.     11/30/22: SES-CD: 0    Last radiographic disease assessment: MRE 1/17/22 with moderate segmental length of ileum (10cm approx) with diffuse wall thickening consistent with acute and chronic inflammation.    Marybel was hospitalized with an SBO on 3/13, improved with conservative management. CT a/p showed a possible 6 mm polyp at the transition point, which was in the proximal/mid small bowel in the RLQ. A SBE was performed by Dr. Lopez on 4/30/2025 that showed a stenosis in the distal jejunum. This was a/w with inflammation with ulcerations.  were nonspecific.     PLAN  ---We had an extensive discussion regarding options: Remicade/Humira, IL23i, Entyvio and surgery.   Marybel is leaning towards a medical route. She will think about these options some more and will let us know what she decides.   ----------Follow up with MTM (Vijay Matthews, PharmD) to continue this conversation    ---Will hold off from budesonide for now given Marybel is feeling well  ---Hold off from attempted conception until deep remission is achieved. --Continue Stelara every 6 weeks     Colon cancer screening:  Given disease is limited to small bowel, colon cancer screening is recommended at age 45.     Misc:  -- Avoid tobacco use  -- Avoid NSAIDs as there is potentially a 25% chance of causing an IBD flare    RTC 3 months with MARIYA Varner MD  Associate Professor of Medicine  Division of Gastroenterology, Hepatology and Nutrition  [facility]    CROHN'S HISTORY:  Age at diagnosis: 2012  Extent of disease: small bowel   Disease phenotype: inflammatory  Chayito-anal disease: none  Prior IBD surgeries: none  Prior IBD Medications:    50 mg 6MP    DRUG MONITORING  TPMT enzyme activity: 40.4 (6/14/2012)    6-TGN/6-MMPN levels: --    Biologic concentration:  Usteinumab 2/2022 = 1.6, moved to every 6 weeks     HPI:   Here for follow-up.   Getting  in 2 weeks!!  Bowel pattern is 1 BM daily. Occasional constipation and bloating.  Nutrition goal: tries to be gluten free.      HBI:  Overall patient well being (prior day): 0 (Very well)  Abdominal pain (prior day): 0 (None)  Number of liquid or soft stools (prior day): 0 (1 point per stool)  Abdominal mass on exam: 0 (None)  Complications (1 point for each):   None    Remission <5  Mild activity 5-7  Moderate activity 8-16  Severe > 16    Extra intestinal manifestations of IBD:  No uveitis/episcleritis  No aphthous ulcers   No arthritis   No erythema nodosum/pyoderma gangrenosum.     Interval history, 11/2023  Had anemia shortly after having a 2 week period in the setting of an IUD. After IUD was removed, periods have normalized. Most recent Hgb in July 14.5.     HBI:  Overall patient well being (prior day): 0 (Very well)  Abdominal pain (prior day): 0 (None)  Number of liquid or soft stools (prior day): 0 (1 point per stool)  Abdominal  mass on exam: 0 (None)  Complications (1 point for each): None    Interval history, 2/2024  Feeling well today. No GI complaints. No waning effect on Stelara q6w.     Going to [place] next month.    HBI:  Overall patient well being (prior day): 0 (Very well)  Abdominal pain (prior day): 0 (None)  Number of liquid or soft stools (prior day): 0 (1 point per stool)  Abdominal mass on exam: 0 (None)  Complications (1 point for each):   None    Interval history, 11/2024  Summer some bloating, occasional constipation. Recently diagnosed with cataracts. Still taking every 6 weeks. Actively trying to conceive. Saw a fertility specialist.     HBI:  Overall patient well being (prior day): 0 (Very well)  Abdominal pain (prior day): 0 (None)  Number of liquid or soft stools (prior day): 0 (1 point per stool)  Abdominal mass on exam: 0 (None)  Complications (1 point for each):   None    Extra intestinal manifestations of IBD:  No uveitis/episcleritis  No aphthous ulcers   No arthritis   No erythema nodosum/pyoderma gangrenosum.       Interval history, 3/2025 (in person)    Hospitalized with an SBO on 3/13, improved with conservative management. CT a/p showed a possible 6 mm polyp at the transition point, which was in the proximal/mid small bowel in the RLQ.     MRE scheduled for 4/2. This was in the middle of her injection interval (every 6 weeks).    Feeling completely back to baseline. Having 1 formed BM per day. No weight loss, urgency, or blood in stool. No abdominal pain. Tolerating all foods.     Interval history, 5/2025 (virtual)  Occasionally feeling bloated. Exploring diet/lifestyle changes.   No abdominal pain. BMs are normal and at baseline. Having 1-2 BMs per day.   Did a food sensitivity test through nutritionist/PCP and is avoiding those that came back as triggers.       ROS:  Constitutional, HEENT, cardiovascular, pulmonary, GI, , musculoskeletal, neuro, skin, endocrine and psych systems are negative, except  as otherwise noted.     PERTINENT PAST MEDICAL HISTORY:  Past Medical History:   Diagnosis Date     Abnormal Pap smear of cervix 2016     Autoimmune disease 2012    Crohns     Crohn's disease (H) 04/08/2021     Hypertension 12/2020    Unsure it previously caused by steroid meds     Irritable bowel syndrome      SBO (small bowel obstruction) (H) 2020     Uncomplicated asthma     Used inhaler in childhood, no issues as adult       PREVIOUS SURGERIES:  Past Surgical History:   Procedure Laterality Date     COLONOSCOPY       COLONOSCOPY N/A 11/30/2022    Procedure: COLONOSCOPY, WITH BIOPSY;  Surgeon: Shiv Jones MD;  [place] [facility] [place]     DENTAL SURGERY      wisdom teeth     EGD       ENTEROSCOPY SMALL BOWEL N/A 4/30/2025    Procedure: ENTEROSCOPY, Antegrade double balloon with small bowel biopsy;  Surgeon: Genoveva Lopez MD;  Location:  OR     ND ABLATE HEART DYSRHYTHM FOCUS  1993     ALLERGIES:     Allergies   Allergen Reactions     Cats Cough     Other Environmental Allergy Itching and Visual Disturbance       PERTINENT MEDICATIONS:    Current Outpatient Medications:      acetaminophen (TYLENOL) 500 MG tablet, Take 500-1,000 mg by mouth every 6 hours as needed for mild pain or pain., Disp: , Rfl:      lactobacillus rhamnosus, GG, (CULTURELL) capsule, Take 1 capsule by mouth daily., Disp: , Rfl:      Olopatadine HCl (PATADAY OP), Apply 1 drop to eye as needed (seasonal allergies)., Disp: , Rfl:      Prenatal Vit-Fe Fumarate-FA (PRENATAL MULTIVITAMIN  PLUS IRON) 27-1 MG TABS, Take 1 tablet by mouth daily., Disp: , Rfl:      ustekinumab (STELARA) 90 MG/ML, INJECT 1 ML (90 MG) UNDER THE SKIN EVERY 6 WEEKS., Disp: 1 mL, Rfl: 0    SOCIAL HISTORY:  Social History     Socioeconomic History     Marital status:      Spouse name: Not on file     Number of children: Not on file     Years of education: Not on file     Highest education level: Not on file   Occupational History     Not on file   Tobacco Use      Smoking status: Never     Smokeless tobacco: Never   Substance and Sexual Activity     Alcohol use: Yes     Comment: occasional     Drug use: Never     Sexual activity: Yes     Partners: Male     Birth control/protection: Condom, None   Other Topics Concern     Parent/sibling w/ CABG, MI or angioplasty before 65F 55M? No   Social History Narrative     Not on file     Social Drivers of Health     Financial Resource Strain: Low Risk  (3/13/2025)    Financial Resource Strain      Within the past 12 months, have you or your family members you live with been unable to get utilities (heat, electricity) when it was really needed?: No   Food Insecurity: Low Risk  (3/13/2025)    Food Insecurity      Within the past 12 months, did you worry that your food would run out before you got money to buy more?: No      Within the past 12 months, did the food you bought just not last and you didn t have money to get more?: No   Transportation Needs: Low Risk  (3/13/2025)    Transportation Needs      Within the past 12 months, has lack of transportation kept you from medical appointments, getting your medicines, non-medical meetings or appointments, work, or from getting things that you need?: No   Physical Activity: Not on file   Stress: Not on file   Social Connections: Not on file   Interpersonal Safety: Low Risk  (4/30/2025)    Interpersonal Safety      Do you feel physically and emotionally safe where you currently live?: Yes      Within the past 12 months, have you been hit, slapped, kicked or otherwise physically hurt by someone?: No      Within the past 12 months, have you been humiliated or emotionally abused in other ways by your partner or ex-partner?: No   Housing Stability: Low Risk  (3/13/2025)    Housing Stability      Do you have housing? : Yes      Are you worried about losing your housing?: No       FAMILY HISTORY:  Cousin has UC  Family History   Problem Relation Age of Onset     Other - See Comments Mother        "  Hysterectomy     Hypertension Father      Cerebrovascular Disease Maternal Grandmother 50     Colon Cancer Maternal Grandfather         60s     Melanoma Maternal Grandfather      Diabetes Type 1 Paternal Grandmother      Other - See Comments Paternal Grandmother         uterine prolapse     Heart Disease Paternal Grandfather 50        Heart attack     Hypertension Cousin      Anesthesia Reaction No family hx of      Deep Vein Thrombosis (DVT) No family hx of        Past/family/social history reviewed and no changes    PHYSICAL EXAMINATION:  Constitutional: aaox3, cooperative, pleasant, not dyspneic/diaphoretic, no acute distress  Vitals reviewed: Ht 1.626 m (5' 4\")   Wt 59 kg (130 lb)   LMP 04/22/2025 (Approximate)   BMI 22.31 kg/m    Wt:   Wt Readings from Last 2 Encounters:   05/21/25 59 kg (130 lb)   04/30/25 60.3 kg (133 lb)      Constitutional - general appearance is well and in no acute distress. Body habitus normal  Eyes - No redness or discharge  Respiratory - No cough, unlabored breathing  Abdomen - Non tender to palpation, no rebound or guarding  Skin - No discoloration or lesions  Neurological - No tremors  Psychiatric - No anxiety, alert & oriented      Again, thank you for allowing me to participate in the care of your patient.        Sincerely,        Earline Ponce MD    Electronically signed"

## 2025-05-21 NOTE — PROGRESS NOTES
Virtual Visit Details    Type of service:  Video Visit     Originating Location (pt. Location): Home    Distant Location (provider location):  Off-site  Platform used for Video Visit: [facility]    IBD CLINIC VISIT     CC/REFERRING MD:  Dr. Akin Bernal  REASON FOR FOLLOW UP: Crohn's    ASSESSMENT/PLAN  35 year old female with Crohn's disease of the small bowel (ileum and likely jejunum)    1. Small bowel crohn's disease:    Current medication:    Ustekinumab every 8 weeks (started 5/2021)   Ustekinumab every 6 weeks (Spring 2022)  Current clinical disease activity: remission, HBI 0   Last endoscopic disease activity:   A SBE was performed by Dr. Lopez on 4/30/2025 that showed a stenosis in the distal jejunum. This was a/w with inflammation with ulcerations.  were nonspecific.     11/30/22: SES-CD: 0    Last radiographic disease assessment: MRE 1/17/22 with moderate segmental length of ileum (10cm approx) with diffuse wall thickening consistent with acute and chronic inflammation.    Marybel was hospitalized with an SBO on 3/13, improved with conservative management. CT a/p showed a possible 6 mm polyp at the transition point, which was in the proximal/mid small bowel in the RLQ. A SBE was performed by Dr. Lopez on 4/30/2025 that showed a stenosis in the distal jejunum. This was a/w with inflammation with ulcerations.  were nonspecific.     PLAN  ---We had an extensive discussion regarding options: Remicade/Humira, IL23i, Entyvio and surgery.   Marybel is leaning towards a medical route. She will think about these options some more and will let us know what she decides.   ----------Follow up with MTM (Vijay Matthews, PharmD) to continue this conversation   ---Will hold off from budesonide for now given Marybel is feeling well  ---Hold off from attempted conception until deep remission is achieved. --Continue Stelara every 6 weeks     Colon cancer screening:  Given disease is limited to small bowel, colon cancer  screening is recommended at age 45.     Misc:  -- Avoid tobacco use  -- Avoid NSAIDs as there is potentially a 25% chance of causing an IBD flare    RTC 3 months with MARIYA Varner MD  Associate Professor of Medicine  Division of Gastroenterology, Hepatology and Nutrition  [facility]    CROHN'S HISTORY:  Age at diagnosis: 2012  Extent of disease: small bowel   Disease phenotype: inflammatory  Chayito-anal disease: none  Prior IBD surgeries: none  Prior IBD Medications:    50 mg 6MP    DRUG MONITORING  TPMT enzyme activity: 40.4 (6/14/2012)    6-TGN/6-MMPN levels: --    Biologic concentration:  Usteinumab 2/2022 = 1.6, moved to every 6 weeks     HPI:   Here for follow-up.   Getting  in 2 weeks!!  Bowel pattern is 1 BM daily. Occasional constipation and bloating.  Nutrition goal: tries to be gluten free.      HBI:  Overall patient well being (prior day): 0 (Very well)  Abdominal pain (prior day): 0 (None)  Number of liquid or soft stools (prior day): 0 (1 point per stool)  Abdominal mass on exam: 0 (None)  Complications (1 point for each):   None    Remission <5  Mild activity 5-7  Moderate activity 8-16  Severe > 16    Extra intestinal manifestations of IBD:  No uveitis/episcleritis  No aphthous ulcers   No arthritis   No erythema nodosum/pyoderma gangrenosum.     Interval history, 11/2023  Had anemia shortly after having a 2 week period in the setting of an IUD. After IUD was removed, periods have normalized. Most recent Hgb in July 14.5.     HBI:  Overall patient well being (prior day): 0 (Very well)  Abdominal pain (prior day): 0 (None)  Number of liquid or soft stools (prior day): 0 (1 point per stool)  Abdominal mass on exam: 0 (None)  Complications (1 point for each): None    Interval history, 2/2024  Feeling well today. No GI complaints. No waning effect on Stelara q6w.     Going to [place] next month.    HBI:  Overall patient well being (prior day): 0 (Very  well)  Abdominal pain (prior day): 0 (None)  Number of liquid or soft stools (prior day): 0 (1 point per stool)  Abdominal mass on exam: 0 (None)  Complications (1 point for each):   None    Interval history, 11/2024  Summer some bloating, occasional constipation. Recently diagnosed with cataracts. Still taking every 6 weeks. Actively trying to conceive. Saw a fertility specialist.     HBI:  Overall patient well being (prior day): 0 (Very well)  Abdominal pain (prior day): 0 (None)  Number of liquid or soft stools (prior day): 0 (1 point per stool)  Abdominal mass on exam: 0 (None)  Complications (1 point for each):   None    Extra intestinal manifestations of IBD:  No uveitis/episcleritis  No aphthous ulcers   No arthritis   No erythema nodosum/pyoderma gangrenosum.       Interval history, 3/2025 (in person)    Hospitalized with an SBO on 3/13, improved with conservative management. CT a/p showed a possible 6 mm polyp at the transition point, which was in the proximal/mid small bowel in the RLQ.     MRE scheduled for 4/2. This was in the middle of her injection interval (every 6 weeks).    Feeling completely back to baseline. Having 1 formed BM per day. No weight loss, urgency, or blood in stool. No abdominal pain. Tolerating all foods.     Interval history, 5/2025 (virtual)  Occasionally feeling bloated. Exploring diet/lifestyle changes.   No abdominal pain. BMs are normal and at baseline. Having 1-2 BMs per day.   Did a food sensitivity test through nutritionist/PCP and is avoiding those that came back as triggers.       ROS:  Constitutional, HEENT, cardiovascular, pulmonary, GI, , musculoskeletal, neuro, skin, endocrine and psych systems are negative, except as otherwise noted.     PERTINENT PAST MEDICAL HISTORY:  Past Medical History:   Diagnosis Date    Abnormal Pap smear of cervix 2016    Autoimmune disease 2012    Crohns    Crohn's disease (H) 04/08/2021    Hypertension 12/2020    Unsure it previously  caused by steroid meds    Irritable bowel syndrome     SBO (small bowel obstruction) (H) 2020    Uncomplicated asthma     Used inhaler in childhood, no issues as adult       PREVIOUS SURGERIES:  Past Surgical History:   Procedure Laterality Date    COLONOSCOPY      COLONOSCOPY N/A 11/30/2022    Procedure: COLONOSCOPY, WITH BIOPSY;  Surgeon: Shiv Jones MD;  [place] [facility] [place]    DENTAL SURGERY      wisdom teeth    EGD      ENTEROSCOPY SMALL BOWEL N/A 4/30/2025    Procedure: ENTEROSCOPY, Antegrade double balloon with small bowel biopsy;  Surgeon: Genoveva Lopez MD;  Location:  OR    HI ABLATE HEART DYSRHYTHM FOCUS  1993     ALLERGIES:     Allergies   Allergen Reactions    Cats Cough    Other Environmental Allergy Itching and Visual Disturbance       PERTINENT MEDICATIONS:    Current Outpatient Medications:     acetaminophen (TYLENOL) 500 MG tablet, Take 500-1,000 mg by mouth every 6 hours as needed for mild pain or pain., Disp: , Rfl:     lactobacillus rhamnosus, GG, (CULTURELL) capsule, Take 1 capsule by mouth daily., Disp: , Rfl:     Olopatadine HCl (PATADAY OP), Apply 1 drop to eye as needed (seasonal allergies)., Disp: , Rfl:     Prenatal Vit-Fe Fumarate-FA (PRENATAL MULTIVITAMIN  PLUS IRON) 27-1 MG TABS, Take 1 tablet by mouth daily., Disp: , Rfl:     ustekinumab (STELARA) 90 MG/ML, INJECT 1 ML (90 MG) UNDER THE SKIN EVERY 6 WEEKS., Disp: 1 mL, Rfl: 0    SOCIAL HISTORY:  Social History     Socioeconomic History    Marital status:      Spouse name: Not on file    Number of children: Not on file    Years of education: Not on file    Highest education level: Not on file   Occupational History    Not on file   Tobacco Use    Smoking status: Never    Smokeless tobacco: Never   Substance and Sexual Activity    Alcohol use: Yes     Comment: occasional    Drug use: Never    Sexual activity: Yes     Partners: Male     Birth control/protection: Condom, None   Other Topics Concern    Parent/sibling w/  CABG, MI or angioplasty before 65F 55M? No   Social History Narrative    Not on file     Social Drivers of Health     Financial Resource Strain: Low Risk  (3/13/2025)    Financial Resource Strain     Within the past 12 months, have you or your family members you live with been unable to get utilities (heat, electricity) when it was really needed?: No   Food Insecurity: Low Risk  (3/13/2025)    Food Insecurity     Within the past 12 months, did you worry that your food would run out before you got money to buy more?: No     Within the past 12 months, did the food you bought just not last and you didn t have money to get more?: No   Transportation Needs: Low Risk  (3/13/2025)    Transportation Needs     Within the past 12 months, has lack of transportation kept you from medical appointments, getting your medicines, non-medical meetings or appointments, work, or from getting things that you need?: No   Physical Activity: Not on file   Stress: Not on file   Social Connections: Not on file   Interpersonal Safety: Low Risk  (4/30/2025)    Interpersonal Safety     Do you feel physically and emotionally safe where you currently live?: Yes     Within the past 12 months, have you been hit, slapped, kicked or otherwise physically hurt by someone?: No     Within the past 12 months, have you been humiliated or emotionally abused in other ways by your partner or ex-partner?: No   Housing Stability: Low Risk  (3/13/2025)    Housing Stability     Do you have housing? : Yes     Are you worried about losing your housing?: No       FAMILY HISTORY:  Cousin has UC  Family History   Problem Relation Age of Onset    Other - See Comments Mother         Hysterectomy    Hypertension Father     Cerebrovascular Disease Maternal Grandmother 50    Colon Cancer Maternal Grandfather         60s    Melanoma Maternal Grandfather     Diabetes Type 1 Paternal Grandmother     Other - See Comments Paternal Grandmother         uterine prolapse    Heart  "Disease Paternal Grandfather 50        Heart attack    Hypertension Cousin     Anesthesia Reaction No family hx of     Deep Vein Thrombosis (DVT) No family hx of        Past/family/social history reviewed and no changes    PHYSICAL EXAMINATION:  Constitutional: aaox3, cooperative, pleasant, not dyspneic/diaphoretic, no acute distress  Vitals reviewed: Ht 1.626 m (5' 4\")   Wt 59 kg (130 lb)   LMP 04/22/2025 (Approximate)   BMI 22.31 kg/m    Wt:   Wt Readings from Last 2 Encounters:   05/21/25 59 kg (130 lb)   04/30/25 60.3 kg (133 lb)      Constitutional - general appearance is well and in no acute distress. Body habitus normal  Eyes - No redness or discharge  Respiratory - No cough, unlabored breathing  Abdomen - Non tender to palpation, no rebound or guarding  Skin - No discoloration or lesions  Neurological - No tremors  Psychiatric - No anxiety, alert & oriented    "

## 2025-05-21 NOTE — NURSING NOTE
Current patient location: [address] [place] [address] [address] [place] [place] [place] [address]    Is the patient currently in the state of [place]? YES    Visit mode: VIDEO    If the visit is dropped, the patient can be reconnected by:VIDEO VISIT: Text to cell phone:   Telephone Information:   Mobile [phone]       Will anyone else be joining the visit? NO  (If patient encounters technical issues they should call [phone]    Are changes needed to the allergy or medication list? No    Are refills needed on medications prescribed by this physician? NO    Rooming Documentation:  Questionnaire(s) completed    Reason for visit: RECHECK (IBD)    Geri MURDOCK

## 2025-05-22 ENCOUNTER — TELEPHONE (OUTPATIENT)
Dept: GASTROENTEROLOGY | Facility: CLINIC | Age: 35
End: 2025-05-22
Payer: COMMERCIAL

## 2025-05-22 NOTE — TELEPHONE ENCOUNTER
Left Voicemail (1st Attempt) and Sent Mychart (1st Attempt) for the patient to call back and schedule the following:    Appointment type: Return   Provider:  -> IBD [facility]   Return date: ~ 8/21 [place]  Specialty phone number: [phone]  Additional appointment(s) needed:   Additonal Notes:

## 2025-06-02 ENCOUNTER — TELEPHONE (OUTPATIENT)
Dept: PHARMACY | Facility: CLINIC | Age: 35
End: 2025-06-02
Payer: COMMERCIAL

## 2025-06-02 NOTE — TELEPHONE ENCOUNTER
Pt is due for appt with Vijay LOPEZ    Call placed to pt to initiate scheduling on 6/2/25    Outcome: LVM

## 2025-06-12 ENCOUNTER — TELEPHONE (OUTPATIENT)
Dept: GASTROENTEROLOGY | Facility: CLINIC | Age: 35
End: 2025-06-12
Payer: COMMERCIAL

## 2025-06-12 NOTE — TELEPHONE ENCOUNTER
PA Initiation    Medication: USTEKINUMAB 90 MG/ML [facility]  Insurance Company: [employer] - Phone [phone] Fax [phone]  Pharmacy Filling the Rx: [facility] - [address] [place] [address]  Filling Pharmacy Phone:    Filling Pharmacy Fax:    Start Date: 6/12/2025  RUPERT

## 2025-06-16 NOTE — TELEPHONE ENCOUNTER
Prior Authorization Approval    Medication: USTEKINUMAB 90 MG/ML [facility]  Authorization Effective Date: 5/13/2025  Authorization Expiration Date: 6/13/2026  Approved Dose/Quantity: 1/42  Reference #: [id number]   Insurance Company: [employer] - Phone [phone] Fax [phone]  Expected CoPay: $    CoPay Card Available:      Financial Assistance Needed:    Which Pharmacy is filling the prescription: [facility] - [address] [facility]  Pharmacy Notified:    Patient Notified:  renewal

## 2025-06-17 ENCOUNTER — VIRTUAL VISIT (OUTPATIENT)
Dept: PHARMACY | Facility: CLINIC | Age: 35
End: 2025-06-17
Attending: INTERNAL MEDICINE
Payer: COMMERCIAL

## 2025-06-17 VITALS — HEIGHT: 64 IN | WEIGHT: 130 LBS | BODY MASS INDEX: 22.2 KG/M2

## 2025-06-17 DIAGNOSIS — J30.9 ALLERGIC RHINITIS: ICD-10-CM

## 2025-06-17 DIAGNOSIS — Z78.9 TAKES DIETARY SUPPLEMENTS: ICD-10-CM

## 2025-06-17 DIAGNOSIS — K50.90 CROHN'S DISEASE WITHOUT COMPLICATION, UNSPECIFIED GASTROINTESTINAL TRACT LOCATION (H): Primary | ICD-10-CM

## 2025-06-17 ASSESSMENT — PAIN SCALES - GENERAL: PAINLEVEL_OUTOF10: NO PAIN (0)

## 2025-06-17 NOTE — NURSING NOTE
Current patient location: work     Is the patient currently in the state of [place]? YES    Visit mode: VIDEO    If the visit is dropped, the patient can be reconnected by:VIDEO VISIT: Text to cell phone:   Telephone Information:   Mobile [phone]       Will anyone else be joining the visit? NO  (If patient encounters technical issues they should call [phone]    Are changes needed to the allergy or medication list? Pt stated no changes to allergies and Pt stated no med changes    Are refills needed on medications prescribed by this physician? NO    Rooming Documentation:  Not applicable      Reason for visit: Medication Therapy Management    Wt other than 24 hrs:    Pain more than one location: no   Cata MURDOCK

## 2025-06-17 NOTE — PROGRESS NOTES
Medication Therapy Management (MTM) Encounter    ASSESSMENT:                            Medication Adherence/Access: No issues identified.    Crohn's Disease:  Marybel would benefit from switching from Stelara to Tremfya 400 mg subcutaneous at weeks 0, 4 and 8 followed by 200 mg subcutaneous at week 12 and every 4 weeks thereafter. They are up to date on routine maintenance labs. They are up to date on annual tuberculosis screening. They are indicated for additional lab work including hepatitis B surface antibody (will discuss next visit). We have not yet started prior authorization for Tremfya.    I will connect with her care team to reach agreement on switching to Tremfya. She will continue Stelara until her first dose.    Seasonal allergies: Stable.    Supplements: Stable.    PLAN:                            We will start working on the prior authorization process for Tremfya. Please be on the lookout for [employer] message and voicemails as we work on this in case we need to provide an important update. Once you start Tremfya you will no longer be on Stelara.     Follow-up: 7/11/2025 at 11:00 AM     EDUCATION:     We reviewed Tremfya today including an overview of the coverage and pharmacy process, mechanism of action, general dosing/administration, side effects (both common/serious), precautions, monitoring for safety and efficacy, as well as time to efficacy. We discussed immunosuppressive precautions including caution with LIVE vaccines unless specifically indicated, and recommendation for indicated non-live/inactivated vaccines. Reviewed potential need to hold medication in the setting of signs/symptoms of infection. Contact information provided in the event they have questions/concerns about the medication.    SUBJECTIVE/OBJECTIVE:                          Marybel Redd is a 35 year old female seen for a follow-up visit.       Reason for visit: biologic discussion.    Allergies/ADRs: Reviewed in  chart  Past Medical History: Reviewed in chart  Tobacco: She reports that she has never smoked. She has never used smokeless tobacco.  Alcohol: Less than 1 beverages / week      Medication Adherence/Access: no issues reported.    Crohn's Disease:   Stelara 90 mg every 6 weeks    Met with Marybel to discuss her next biologic in setting of segment of disease in her jejunum warranting a switch in therapy. At last visit with Dr. Ponce, she discussed the following options: IL23i, VDZ and Remicade/Humira. Interested in subcutaneous inudction Tremfya.     Interested in      Lab Results   Component Value Date    USTCON 5.7 2025    USTABY <1.6 2025           PRO-3 for Crohn's Disease    Please select the one best answer for the patient's ability at this time     Over the past week, how many liquid or soft stools have you had on average per day?   0 (When scoring, multiply number by 2=0)   Over the past week, please rate your average abdominal pain  None : 0 points  3. Over the past week, please rate your general well-being    Generally Well: 0 points    Score: 0  <13: Remission  13-21: Mild Activity   22-52: Moderate Activity  >/= 53: Severe Activity     Specialty medication department: [facility]  Prior authorization status:  approved through 2026  Original start date: 2021, escalated to q6 weeks spring 2022  Last dose: about a month ago  Next dose:    Therapeutic drug monitorin2025 ustekinumab 5.7 no anitbodies (stelara i8vrlog)    Last provider visit: 2025 MD Rosario  Next provider visit: RTC 3 months  Last labs completed: 2025  Lab frequency: every 3 months   - standing labs CBC with platelets & diff, CMP, CRP  2025  Next labs due: 2025    Last IBD Health Maintenance Review: 2023  PDC: 93%    IBD Health Maintenance    Vaccinations:  All patients on immunosuppression should avoid live vaccines unless specifically indicated.    -- Influenza (last dose)- last  "2022  -- Tetanus booster (last dose)- last 9/2016  -- Pneumococcal Pneumonia    PCV-13: not on file   PPSV-23: not on file   PCV-20/PCV-21: not on file  -- COVID-19    Initial series: 3/2021, 4/2021, 11/2021   Last Dose: 11/2021    One time confirmation of immunity or serologies:  -- Hepatitis A (serologies or immunizations)-- not on file  -- Hepatitis B (serologies or immunizations)- indeterminate immunity 2024  -- Varicella/Zoster    Varicella--    Zoster  -- MMR-- 3/30/1993, 8/23/1996  -- HPV (all aged 18-26)- 10/2006, 12/2006, 5/2007  -- Meningococcal meningitis (all patients at risk for meningitis)-- MenACWY 12/2007    Due to the immunosuppression in this patient, I would not advise administration of live vaccines such as varicella/VZV, intranasal influenza, MMR, or yellow fever vaccine (if traveling).      Immunosuppressive Screening:  -- Hep B Surface Antibody indeterminate 3/1/2024   -- Hep B Surface Antigen nonreactive 3/1/2024   -- Hep B Core Antibody nonreactive 6/18/2024   -- Hep C Antibody nonreactive 3/7/2022   -- Yearly assessment of TB negative 6/18/2024    Lab Results   Component Value Date    AUSAB 9.32 03/01/2024    HEPBANG Nonreactive 03/01/2024    HBCAB Nonreactive 06/18/2024    HCVAB Nonreactive 03/07/2022    TBRES Negative 06/18/2024       Bone mineral density screening   -- Recommend all patients supplement with calcium and vitamin D  -- Last DEXA 2022    Cancer Screening:  Colon cancer screening:  Per Dr. Ponce 11/1/23: \"Given disease is limited to small bowel, colon cancer screening is recommended at age 45. \"     Cervical cancer screening: Per [facility]- Last 2021     Skin cancer screening: Annual visual exam of skin by dermatologist since patient is immunocompromised- 8/2024    Depression Screening:    PHQ-2 Score:         5/21/2025     9:06 AM 4/28/2025     6:51 AM   PHQ-2 ( 1999 Pfizer)   Q1: Little interest or pleasure in doing things 0 0   Q2: Feeling down, depressed or hopeless 0 0 " "  PHQ-2 Score 0 0       Research:  Are you interested in being contacted about enrollment in clinical research studies? Yes     Would you like to receive a quarterly newsletter on research via email. NO      Misc:  -- Avoid tobacco use  -- Avoid NSAIDs as there is potentially a 25% chance of causing an IBD flare      Seasonal allergies:  Olopatadine eye drops as needed- Hasn't used in several months     Denies side effects or concerns. Effective when used.    Supplements:  Collagen supplement   Probiotic once daily  Prenatal multivitamin once daily- not taking as frequently    Denies side effects or concerns.    Today's Vitals: Ht 5' 4\" (1.626 m)   Wt 130 lb (59 kg)   LMP 04/22/2025 (Approximate)   BMI 22.31 kg/m    ----------------      I spent 42 minutes with this patient today. All changes were made via collaborative practice agreement with Earline Ponce MD.     A summary of these recommendations was sent via [employer].    Raya EnamoradoD, BCPS  MT Pharmacist   [facility]  Phone: [phone]    Telemedicine Visit Details  The patient's medications can be safely assessed via a telemedicine encounter.  Type of service:  Telephone visit  Originating Location (pt. Location): Home    Distant Location (provider location):  Off-site  Start Time: 2:30 PM  End Time: 3:12 PM     Medication Therapy Recommendations  Crohn's disease of both small and large intestine without complication (H)   1 Rationale: Condition refractory to medication - Ineffective medication - Effectiveness   Recommendation: Change Medication - Tremfya Crohns Induction 200 MG/2ML Soaj   Status: Contact Provider - Awaiting Response   Identified Date: 6/17/2025              "

## 2025-06-17 NOTE — PATIENT INSTRUCTIONS
"Recommendations from today's MTM visit:                                                      We will start working on the prior authorization process for Tremfya. Please be on the lookout for [employer] message and voicemails as we work on this in case we need to provide an important update. Once you start Tremfya you will no longer be on Stelara.     Follow-up: 7/11/2025 at 11:00 AM     It was great speaking with you today.  I value your experience and would be very thankful for your time in providing feedback in our clinic survey. In the next few days, you may receive an email or text message from [facility] [employer] with a link to a survey related to your  clinical pharmacist.\"     To schedule another MTM appointment, please call the clinic directly or you may call the MTM scheduling line at [phone] or toll-free at [phone].     My Clinical Pharmacist's contact information:                                                      Please feel free to contact me with any questions or concerns you have.      Raya EnamoradoD, BCPS  MTM Pharmacist   [facility]  Phone: [phone]   "

## 2025-06-17 NOTE — PROGRESS NOTES
"Virtual Visit Details    Type of service:  Video Visit     Originating Location (pt. Location): {video visit patient location:177188::\"Home\"}  {PROVIDER LOCATION On-site should be selected for visits conducted from your clinic location or adjoining [facility] hospital, academic office, or other nearby [facility] building. Off-site should be selected for all other provider locations, including home:[address]}  Distant Location (provider location):  {virtual location provider:672300}  Platform used for Video Visit: {Virtual Visit Platforms:074627::\"[employer]\"}  "

## 2025-07-09 ENCOUNTER — ALLIED HEALTH/NURSE VISIT (OUTPATIENT)
Dept: GASTROENTEROLOGY | Facility: CLINIC | Age: 35
End: 2025-07-09
Payer: COMMERCIAL

## 2025-07-09 DIAGNOSIS — K50.018 CROHN'S DISEASE OF SMALL INTESTINE WITH OTHER COMPLICATION (H): Primary | ICD-10-CM

## 2025-07-09 NOTE — PROGRESS NOTES
Clinic visit today for first subcutaneous Tremfya injection. The patient brought her own supply of medication to this appointment.    Education provided on medication/pen inspection, temperature control, travel guidelines, administration of medication, sharps disposal, and refill protocol.      Provided demonstration of injection with training pen. Patient successfully self-administered her dose of 400mg Tremfya under supervision. This was the first dose of her loading series (Week 0). She plans to administer her next loading dose of 400mg in 4 weeks.     Reviewed symptoms of a reaction and monitored patient for 15 minutes after injection. No reaction noted.     All questions answered at this time. Patient feels comfortable administering her injections independently at home for future doses.    GI symptoms today: no active GI symptom today.    Next injection due August 6th, 2025.

## 2025-07-10 ENCOUNTER — TELEPHONE (OUTPATIENT)
Dept: GASTROENTEROLOGY | Facility: CLINIC | Age: 35
End: 2025-07-10
Payer: COMMERCIAL

## 2025-07-10 NOTE — TELEPHONE ENCOUNTER
Left Voicemail (1st Attempt) for the patient to call back and schedule the following:    Appointment type: return   Provider: Dr. Ponce   Return date: 8/13 early in the day   Specialty phone number: [phone]  Additional appointment(s) needed:   Additonal Notes:

## 2025-07-11 ENCOUNTER — VIRTUAL VISIT (OUTPATIENT)
Dept: PHARMACY | Facility: CLINIC | Age: 35
End: 2025-07-11
Attending: INTERNAL MEDICINE
Payer: COMMERCIAL

## 2025-07-11 VITALS — HEIGHT: 64 IN | BODY MASS INDEX: 22.2 KG/M2 | WEIGHT: 130 LBS

## 2025-07-11 DIAGNOSIS — K50.90 CROHN'S DISEASE WITHOUT COMPLICATION, UNSPECIFIED GASTROINTESTINAL TRACT LOCATION (H): Primary | ICD-10-CM

## 2025-07-11 ASSESSMENT — PAIN SCALES - GENERAL: PAINLEVEL_OUTOF10: NO PAIN (0)

## 2025-07-11 NOTE — NURSING NOTE
Current patient location: [address] [place] [address] [address] [place] [place] [address]    Is the patient currently in the state of [place]? YES    Visit mode: VIDEO    If the visit is dropped, the patient can be reconnected by:VIDEO VISIT: Text to cell phone:   Telephone Information:   Mobile [phone]       Will anyone else be joining the visit? NO  (If patient encounters technical issues they should call [phone])    Are changes needed to the allergy or medication list? No    Are refills needed on medications prescribed by this physician? NO    Rooming Documentation:  Questionnaire(s) not done per department protocol    Reason for visit: Consult    Carley MURDOCK

## 2025-07-11 NOTE — PROGRESS NOTES
Medication Therapy Management (MTM) Encounter    ASSESSMENT:                            Medication Adherence/Access: No issues identified.    Crohn's Disease:  Marybel would benefit from continued treatment with Tremfya subcutaneous induction followed by maintenance 200 mg at week 12 and every 4 weeks thereafter. They are up to date on routine maintenance labs. They are up to date on annual tuberculosis screening. They are indicated for additional lab work including hepatitis B surface antibody (previous indeterminate result). No access issues for their advanced therapy are present. They are indicated for a few vaccinations which were recommended to them. They are not getting adequate calcium in their diet and supplementation was recommended if cannot be adequately obtained from diet. Reminders for routine cancer screening were provided; continue routine skin checks with dermatology.    PLAN:                            Continue Tremfya. I have put your schedule below that includes the dates for your doses and what labs to complete around the time of that injection (getting bloodwork a few days before or after injection is fine).  I have placed an order for a Hepatitis B surface antibody (to check immunity from prior vaccination) and Quantiferon-Tb (annual tuberculosis screening). It is OK to wait until your next labs are due in August to complete these.  Marybel will consider the following vaccines:  Annual flu shot  Updated COVID-19 vaccine  Shingles (Shingrix 2-dose series)- Prescription [facility] ph: [phone]   You can get Hepatitis A vaccine if interested (due to international travel). If the results of Hepatitis B surface antibody indicate you are due for vaccination, you could get the Twinrix vaccination which is a 3-dose series that provides immunity to both Hepatitis A and B.  Avoid LIVE vaccines  Continue vitamin D 1000 units once daily (multivitamin).   I recommend a calcium goal of  1000 mg/day between diet and supplementation. Please review the foods you eat and see if you are getting at least 1000 mg/day of calcium from your diet. If you are not getting adequate calcium from your diet then consider supplementation with calcium carbonate 500 mg tablet once or twice daily. https://www.dietaryguidelines.gov/food-sources-calcium  Reminder to discuss frequency of cervical cancer screening (Pap test) with your PCP or OB/Gyn. We recommend considering annual while on immunosuppression.  Take acetaminophen (brand name: Tylenol) for mild aches and pain. Avoid NSAIDs, which include aspirin, ibuprofen (Advil, Motrin), and naproxen (Aleve, Naprosyn).     Tremfya schedule:  7/9/2025, Week 0: 2 pens   8/6/2025, Week 4: 2 pens + CMP CBC with platelets & diff, CRP  9/3/2025, Week 8: 2 pens  + hepatic panel  10/1/2025, Week 12: 1 pen  + hepatic panel  10/29/2025, Week 16: 1 pen  + CMP, CBC with platelets & diff, CRP  And every 4 weeks thereafter. Labs due every 3 months.    Follow-up: 1/9/2026 at 11:00 AM (telephone)    SUBJECTIVE/OBJECTIVE:                          Marybel Redd is a 35 year old female seen for a follow-up visit.       Reason for visit: Tremfya + IBD health maintenance.    Allergies/ADRs: Reviewed in chart  Past Medical History: Reviewed in chart  Tobacco: She reports that she has never smoked. She has never used smokeless tobacco.  Alcohol: Less than 1 beverages / week      Medication Adherence/Access: no issues reported.    Crohn's Disease:   Tremfya 400 mg subcutaneous at weeks 0, 4 and 8 followed by tremfya 200 mg at week 12 and every 4 weeks thereafter.     Had first injection of Tremfya with nurse in clinic, denies side effects or concerns,feels comfortable doing subsequent injections.     Specialty medication department: [facility]  Prior authorization status: 12/84 approved through 12/17/2025  Original start date: 7/9/2025  Last dose: 7/9/2025  Next dose: 8/6/2025       Last  provider visit: 2025 MD Rosario  Next provider visit: 2025  Last labs completed: 2025  Lab frequency: every 3 months              - standing labs CBC with platelets & diff, CMP, CRP  2025  Next labs due: 2025     Last IBD Health Maintenance Review: 2023  PDC: 93%    Tremfya schedule:  2025, Week 0: 2 pens   2025, Week 4: 2 pens + CMP CBC with platelets & diff, CRP  9/3/2025, Week 8: 2 pens  + hepatic panel  10/1/2025, Week 12: 1 pen  + hepatic panel  10/29/2025, Week 16: 1 pen  + CMP, CBC with platelets & diff, CRP  And every 4 weeks thereafter. Labs due every 3 months.    IBD Health Maintenance     Vaccinations:  All patients on immunosuppression should avoid live vaccines unless specifically indicated.    -- Influenza (last dose)- last   -- Tetanus booster (last dose)- last 2016  -- Pneumococcal Pneumonia               PCV-13: not on file              PPSV-23: not on file              PCV-20/PCV-21: not on file  -- COVID-19               Initial series: 3/2021, 2021, 2021              Last Dose: 2021     One time confirmation of immunity or serologies:  -- Hepatitis A (serologies or immunizations)-- not on file (risk factor: international travelling)   -- Hepatitis B (serologies or immunizations)- indeterminate immunity   -- Varicella/Zoster               Varicella-- chickenpox disease               Zoster  -- MMR-- 3/30/1993, 1996  -- HPV (all aged 18-26)- 10/2006, 2006, 2007  -- Meningococcal meningitis (all patients at risk for meningitis)-- MenACWY 2007     Due to the immunosuppression in this patient, I would not advise administration of live vaccines such as varicella/VZV, intranasal influenza, MMR, or yellow fever vaccine (if traveling).       Immunosuppressive Screening:  -- Hep B Surface Antibody indeterminate 3/1/2024   -- Hep B Surface Antigen nonreactive 3/1/2024   -- Hep B Core Antibody nonreactive 2024   -- Hep C Antibody  "nonreactive 3/7/2022   -- Yearly assessment of TB negative 6/18/2024       Bone mineral density screening   -- Recommend all patients supplement with calcium and vitamin D  -- Last DEXA 2022     Cancer Screening:  Colon cancer screening:  Per Dr. Ponce 11/1/23: \"Given disease is limited to small bowel, colon cancer screening is recommended at age 45. \"     Cervical cancer screening: Per OBGYN- Last 2021     Skin cancer screening: Annual visual exam of skin by dermatologist since patient is immunocompromised- 8/2024     Depression Screening:     PHQ-2 Score:           5/21/2025     9:06 AM 4/28/2025     6:51 AM   PHQ-2 ( 1999 Pfizer)   Q1: Little interest or pleasure in doing things 0 0   Q2: Feeling down, depressed or hopeless 0 0   PHQ-2 Score 0 0         Research:  Are you interested in being contacted about enrollment in clinical research studies? Yes     Would you like to receive a quarterly newsletter on research via email. NO       Misc:  -- Avoid tobacco use  -- Avoid NSAIDs as there is potentially a 25% chance of causing an IBD flare    ----------------      I spent 30 minutes with this patient today. All changes were made via collaborative practice agreement with Earline Ponce MD.     A summary of these recommendations was sent via [employer].    Raya EnamoradoD, BCPS  MTM Pharmacist   [facility]  Phone: [phone]    Telemedicine Visit Details  The patient's medications can be safely assessed via a telemedicine encounter.  Type of service:  Video Conference via [employer]  Originating Location (pt. Location): Home    Distant Location (provider location):  Off-site  Start Time: 11:00 AM  End Time: 11:30 AM     Medication Therapy Recommendations  No medication therapy recommendations to display     "

## 2025-07-11 NOTE — PATIENT INSTRUCTIONS
Recommendations from today's [facility] visit:                                                      Continue Tremfya. I have put your schedule below that includes the dates for your doses and what labs to complete around the time of that injection (getting bloodwork a few days before or after injection is fine).  I have placed an order for a Hepatitis B surface antibody (to check immunity from prior vaccination) and Quantiferon-Tb (annual tuberculosis screening). It is OK to wait until your next labs are due in August to complete these.  Marybel will consider the following vaccines:  Annual flu shot  Updated COVID-19 vaccine  Shingles (Shingrix 2-dose series)- Prescription [facility] ph: [phone]   You can get Hepatitis A vaccine if interested (due to international travel). If the results of Hepatitis B surface antibody indicate you are due for vaccination, you could get the Twinrix vaccination which is a 3-dose series that provides immunity to both Hepatitis A and B.  Avoid LIVE vaccines  Continue vitamin D 1000 units once daily (multivitamin).   I recommend a calcium goal of 1000 mg/day between diet and supplementation. Please review the foods you eat and see if you are getting at least 1000 mg/day of calcium from your diet. If you are not getting adequate calcium from your diet then consider supplementation with calcium carbonate 500 mg tablet once or twice daily. https://www.dietaryguidelines.gov/food-sources-calcium  Reminder to discuss frequency of cervical cancer screening (Pap test) with your PCP or OB/Gyn. We recommend considering annual while on immunosuppression.  Take acetaminophen (brand name: Tylenol) for mild aches and pain. Avoid NSAIDs, which include aspirin, ibuprofen (Advil, Motrin), and naproxen (Aleve, Naprosyn).     Tremfya schedule:  7/9/2025, Week 0: 2 pens   8/6/2025, Week 4: 2 pens + CMP, CBC with platelets & diff, CRP   9/3/2025, Week 8: 2 pens  + hepatic panel  10/1/2025, Week  "12: 1 pen  + hepatic panel  10/29/2025, Week 16: 1 pen  + CMP, CBC with platelets & diff, CRP (standing labs)  And every 4 weeks thereafter. Labs due every 3 months.    Follow-up: 1/9/2026 at 11:00 AM (telephone)    It was great speaking with you today.  I value your experience and would be very thankful for your time in providing feedback in our clinic survey. In the next few days, you may receive an email or text message from [employer] with a link to a survey related to your  clinical pharmacist.\"     To schedule another MTM appointment, please call the clinic directly or you may call the MTM scheduling line at [phone] or toll-free at [phone].     My Clinical Pharmacist's contact information:                                                      Please feel free to contact me with any questions or concerns you have.      Navneet Matthews, PharmD, BCPS  MTM Pharmacist   [facility]  Phone: [phone]   "

## 2025-08-06 ENCOUNTER — LAB (OUTPATIENT)
Dept: LAB | Facility: CLINIC | Age: 35
End: 2025-08-06
Payer: COMMERCIAL

## 2025-08-06 DIAGNOSIS — K50.90 CROHN'S DISEASE WITHOUT COMPLICATION, UNSPECIFIED GASTROINTESTINAL TRACT LOCATION (H): ICD-10-CM

## 2025-08-06 DIAGNOSIS — K50.90 CROHN'S DISEASE (H): ICD-10-CM

## 2025-08-06 LAB
ALBUMIN SERPL BCG-MCNC: 4.3 G/DL (ref 3.5–5.2)
ALP SERPL-CCNC: 45 U/L (ref 40–150)
ALT SERPL W P-5'-P-CCNC: 13 U/L (ref 0–50)
ANION GAP SERPL CALCULATED.3IONS-SCNC: 9 MMOL/L (ref 7–15)
AST SERPL W P-5'-P-CCNC: 18 U/L (ref 0–45)
BASOPHILS # BLD AUTO: 0 10E3/UL (ref 0–0.2)
BASOPHILS NFR BLD AUTO: 0 %
BILIRUB SERPL-MCNC: 0.4 MG/DL
BILIRUBIN DIRECT (ROCHE PRO & PURE): 0.19 MG/DL (ref 0–0.45)
BUN SERPL-MCNC: 10.8 MG/DL (ref 6–20)
CALCIUM SERPL-MCNC: 9.3 MG/DL (ref 8.8–10.4)
CHLORIDE SERPL-SCNC: 105 MMOL/L (ref 98–107)
CREAT SERPL-MCNC: 0.63 MG/DL (ref 0.51–0.95)
CRP SERPL-MCNC: <3 MG/L
EGFRCR SERPLBLD CKD-EPI 2021: >90 ML/MIN/1.73M2
EOSINOPHIL # BLD AUTO: 0.1 10E3/UL (ref 0–0.7)
EOSINOPHIL NFR BLD AUTO: 1 %
ERYTHROCYTE [DISTWIDTH] IN BLOOD BY AUTOMATED COUNT: 12.3 % (ref 10–15)
GLUCOSE SERPL-MCNC: 118 MG/DL (ref 70–99)
HBV SURFACE AB SERPL IA-ACNC: 7.42 M[IU]/ML
HBV SURFACE AB SERPL IA-ACNC: NONREACTIVE M[IU]/ML
HCO3 SERPL-SCNC: 24 MMOL/L (ref 22–29)
HCT VFR BLD AUTO: 43 % (ref 35–47)
HGB BLD-MCNC: 14.2 G/DL (ref 11.7–15.7)
IMM GRANULOCYTES # BLD: 0 10E3/UL
IMM GRANULOCYTES NFR BLD: 0 %
LYMPHOCYTES # BLD AUTO: 1.9 10E3/UL (ref 0.8–5.3)
LYMPHOCYTES NFR BLD AUTO: 24 %
MCH RBC QN AUTO: 29.8 PG (ref 26.5–33)
MCHC RBC AUTO-ENTMCNC: 33 G/DL (ref 31.5–36.5)
MCV RBC AUTO: 90 FL (ref 78–100)
MONOCYTES # BLD AUTO: 0.6 10E3/UL (ref 0–1.3)
MONOCYTES NFR BLD AUTO: 8 %
NEUTROPHILS # BLD AUTO: 5.1 10E3/UL (ref 1.6–8.3)
NEUTROPHILS NFR BLD AUTO: 66 %
PLATELET # BLD AUTO: 294 10E3/UL (ref 150–450)
POTASSIUM SERPL-SCNC: 3.8 MMOL/L (ref 3.4–5.3)
PROT SERPL-MCNC: 6.8 G/DL (ref 6.4–8.3)
RBC # BLD AUTO: 4.77 10E6/UL (ref 3.8–5.2)
SODIUM SERPL-SCNC: 138 MMOL/L (ref 135–145)
WBC # BLD AUTO: 7.7 10E3/UL (ref 4–11)

## 2025-08-06 PROCEDURE — 82248 BILIRUBIN DIRECT: CPT

## 2025-08-06 PROCEDURE — 86140 C-REACTIVE PROTEIN: CPT

## 2025-08-06 PROCEDURE — 36415 COLL VENOUS BLD VENIPUNCTURE: CPT

## 2025-08-06 PROCEDURE — 80053 COMPREHEN METABOLIC PANEL: CPT

## 2025-08-06 PROCEDURE — 86706 HEP B SURFACE ANTIBODY: CPT

## 2025-08-06 PROCEDURE — 85025 COMPLETE CBC W/AUTO DIFF WBC: CPT

## 2025-08-09 LAB
QUANTIFERON MITOGEN: 10 IU/ML
QUANTIFERON NIL TUBE: 0.06 IU/ML
QUANTIFERON TB1 TUBE: 0.15 IU/ML
QUANTIFERON TB2 TUBE: 0.19

## 2025-08-10 LAB
GAMMA INTERFERON BACKGROUND BLD IA-ACNC: 0.06 IU/ML
M TB IFN-G BLD-IMP: NEGATIVE
M TB IFN-G CD4+ BCKGRND COR BLD-ACNC: 9.94 IU/ML
MITOGEN IGNF BCKGRD COR BLD-ACNC: 0.09 IU/ML
MITOGEN IGNF BCKGRD COR BLD-ACNC: 0.13 IU/ML

## 2025-08-13 ENCOUNTER — OFFICE VISIT (OUTPATIENT)
Dept: GASTROENTEROLOGY | Facility: CLINIC | Age: 35
End: 2025-08-13
Attending: INTERNAL MEDICINE
Payer: COMMERCIAL

## 2025-08-13 VITALS
HEIGHT: 64 IN | OXYGEN SATURATION: 98 % | HEART RATE: 83 BPM | SYSTOLIC BLOOD PRESSURE: 133 MMHG | DIASTOLIC BLOOD PRESSURE: 89 MMHG | BODY MASS INDEX: 22.2 KG/M2 | WEIGHT: 130 LBS

## 2025-08-13 DIAGNOSIS — K50.012 CROHN'S DISEASE OF SMALL INTESTINE WITH INTESTINAL OBSTRUCTION (H): Primary | ICD-10-CM

## 2025-08-13 DIAGNOSIS — D84.9 IMMUNOSUPPRESSION: ICD-10-CM

## 2025-08-13 ASSESSMENT — PAIN SCALES - GENERAL: PAINLEVEL_OUTOF10: NO PAIN (0)

## 2025-08-26 ENCOUNTER — OFFICE VISIT (OUTPATIENT)
Dept: DERMATOLOGY | Facility: CLINIC | Age: 35
End: 2025-08-26
Payer: COMMERCIAL

## 2025-08-26 DIAGNOSIS — L81.4 SOLAR LENTIGO: ICD-10-CM

## 2025-08-26 DIAGNOSIS — D18.01 CHERRY ANGIOMA: ICD-10-CM

## 2025-08-26 DIAGNOSIS — Z12.83 SKIN CANCER SCREENING: Primary | ICD-10-CM

## 2025-08-26 DIAGNOSIS — D22.9 MULTIPLE BENIGN NEVI: ICD-10-CM

## 2025-08-26 DIAGNOSIS — K50.80 CROHN'S DISEASE OF BOTH SMALL AND LARGE INTESTINE WITHOUT COMPLICATION (H): ICD-10-CM

## 2025-08-26 DIAGNOSIS — Z80.8 FAMILY HISTORY OF MELANOMA: ICD-10-CM

## 2025-09-02 DIAGNOSIS — K50.90 CROHN'S DISEASE WITHOUT COMPLICATION, UNSPECIFIED GASTROINTESTINAL TRACT LOCATION (H): ICD-10-CM

## 2025-09-03 ENCOUNTER — LAB (OUTPATIENT)
Dept: LAB | Facility: CLINIC | Age: 35
End: 2025-09-03
Payer: COMMERCIAL

## 2025-09-03 DIAGNOSIS — K50.90 CROHN'S DISEASE (H): ICD-10-CM

## 2025-09-03 DIAGNOSIS — K50.90 CROHN'S DISEASE WITHOUT COMPLICATION, UNSPECIFIED GASTROINTESTINAL TRACT LOCATION (H): ICD-10-CM

## 2025-09-03 LAB
ALBUMIN SERPL BCG-MCNC: 4.1 G/DL (ref 3.5–5.2)
ALP SERPL-CCNC: 51 U/L (ref 40–150)
ALT SERPL W P-5'-P-CCNC: 13 U/L (ref 0–50)
ANION GAP SERPL CALCULATED.3IONS-SCNC: 10 MMOL/L (ref 7–15)
AST SERPL W P-5'-P-CCNC: 26 U/L (ref 0–45)
BASOPHILS # BLD AUTO: 0.04 10E3/UL (ref 0–0.2)
BASOPHILS NFR BLD AUTO: 0.6 %
BILIRUB SERPL-MCNC: 0.4 MG/DL
BILIRUBIN DIRECT (ROCHE PRO & PURE): 0.13 MG/DL (ref 0–0.45)
BUN SERPL-MCNC: 10.1 MG/DL (ref 6–20)
CALCIUM SERPL-MCNC: 9.1 MG/DL (ref 8.8–10.4)
CHLORIDE SERPL-SCNC: 106 MMOL/L (ref 98–107)
CREAT SERPL-MCNC: 0.6 MG/DL (ref 0.51–0.95)
CRP SERPL-MCNC: 5.02 MG/L
EGFRCR SERPLBLD CKD-EPI 2021: >90 ML/MIN/1.73M2
EOSINOPHIL # BLD AUTO: 0.19 10E3/UL (ref 0–0.7)
EOSINOPHIL NFR BLD AUTO: 3 %
ERYTHROCYTE [DISTWIDTH] IN BLOOD BY AUTOMATED COUNT: 12.2 % (ref 10–15)
GLUCOSE SERPL-MCNC: 92 MG/DL (ref 70–99)
HCO3 SERPL-SCNC: 22 MMOL/L (ref 22–29)
HCT VFR BLD AUTO: 40.2 % (ref 35–47)
HGB BLD-MCNC: 14.1 G/DL (ref 11.7–15.7)
IMM GRANULOCYTES # BLD: <0.04 10E3/UL
IMM GRANULOCYTES NFR BLD: 0.2 %
LYMPHOCYTES # BLD AUTO: 1.24 10E3/UL (ref 0.8–5.3)
LYMPHOCYTES NFR BLD AUTO: 19.4 %
MCH RBC QN AUTO: 30.9 PG (ref 26.5–33)
MCHC RBC AUTO-ENTMCNC: 35.1 G/DL (ref 31.5–36.5)
MCV RBC AUTO: 88.2 FL (ref 78–100)
MONOCYTES # BLD AUTO: 0.65 10E3/UL (ref 0–1.3)
MONOCYTES NFR BLD AUTO: 10.2 %
NEUTROPHILS # BLD AUTO: 4.26 10E3/UL (ref 1.6–8.3)
NEUTROPHILS NFR BLD AUTO: 66.6 %
PLATELET # BLD AUTO: 273 10E3/UL (ref 150–450)
POTASSIUM SERPL-SCNC: 3.8 MMOL/L (ref 3.4–5.3)
PROT SERPL-MCNC: 6.8 G/DL (ref 6.4–8.3)
RBC # BLD AUTO: 4.56 10E6/UL (ref 3.8–5.2)
SODIUM SERPL-SCNC: 138 MMOL/L (ref 135–145)
WBC # BLD AUTO: 6.39 10E3/UL (ref 4–11)

## 2025-09-03 PROCEDURE — 85025 COMPLETE CBC W/AUTO DIFF WBC: CPT

## 2025-09-03 PROCEDURE — 82248 BILIRUBIN DIRECT: CPT

## 2025-09-03 PROCEDURE — 80053 COMPREHEN METABOLIC PANEL: CPT

## 2025-09-03 PROCEDURE — 86140 C-REACTIVE PROTEIN: CPT

## 2025-09-03 PROCEDURE — 36415 COLL VENOUS BLD VENIPUNCTURE: CPT

## (undated) DEVICE — Device

## (undated) DEVICE — SOL WATER IRRIG 500ML BOTTLE 2F7113

## (undated) DEVICE — FORCEP BIOPSY SPYBITE MAX 286CMX1.2MM M00546470

## (undated) DEVICE — ENDO FORCEP ENDOJAW BIOPSY 2.8MMX230CM FB-220U

## (undated) DEVICE — ENDO FORCEP BX CAPTURA JUMBO SPIKE 2.8MMX230CM G53042

## (undated) DEVICE — SPECIMEN CONTAINER 3OZ W/FORMALIN 59901

## (undated) DEVICE — SUCTION MANIFOLD NEPTUNE 2 SYS 1 PORT 702-025-000

## (undated) DEVICE — SNARE CAPIVATOR ROUND COLD SNR BX10 M00561101

## (undated) DEVICE — TUBING SUCTION 12"X1/4" N612

## (undated) DEVICE — SYR 50ML LL W/O NDL 309653

## (undated) DEVICE — KIT ENDO TURNOVER/PROCEDURE CARRY-ON 101822

## (undated) DEVICE — GOWN IMPERVIOUS 2XL BLUE

## (undated) DEVICE — SUCTION MANIFOLD NEPTUNE 2 SYS 4 PORT 0702-020-000

## (undated) DEVICE — KIT ENDO FIRST STEP DISINFECTANT 200ML W/POUCH EP-4

## (undated) DEVICE — SOL NACL 0.9% IRRIG 1000ML BOTTLE 2F7124

## (undated) DEVICE — GRASPING DEVICE RAPTOR ALLIGATOR 00711178

## (undated) RX ORDER — HYOSCYAMINE SULFATE 0.12 MG/1
TABLET SUBLINGUAL
Status: DISPENSED
Start: 2025-04-02

## (undated) RX ORDER — FENTANYL CITRATE 50 UG/ML
INJECTION, SOLUTION INTRAMUSCULAR; INTRAVENOUS
Status: DISPENSED
Start: 2025-04-30

## (undated) RX ORDER — DEXAMETHASONE SODIUM PHOSPHATE 4 MG/ML
INJECTION, SOLUTION INTRA-ARTICULAR; INTRALESIONAL; INTRAMUSCULAR; INTRAVENOUS; SOFT TISSUE
Status: DISPENSED
Start: 2025-04-30

## (undated) RX ORDER — PROPOFOL 10 MG/ML
INJECTION, EMULSION INTRAVENOUS
Status: DISPENSED
Start: 2025-04-30